# Patient Record
Sex: FEMALE | Race: WHITE | Employment: OTHER | URBAN - METROPOLITAN AREA
[De-identification: names, ages, dates, MRNs, and addresses within clinical notes are randomized per-mention and may not be internally consistent; named-entity substitution may affect disease eponyms.]

---

## 2017-02-23 RX ORDER — LISINOPRIL AND HYDROCHLOROTHIAZIDE 25; 20 MG/1; MG/1
TABLET ORAL
Qty: 90 TABLET | Refills: 0 | Status: SHIPPED | OUTPATIENT
Start: 2017-02-23 | End: 2017-05-30

## 2017-02-23 NOTE — TELEPHONE ENCOUNTER
Hypertensive Medications  Protocol Criteria:  · Appointment scheduled in the past 6 months or in the next 3 months  · BMP or CMP in the past 12 months  · Creatinine result < 2    Future Appointments       Provider Department Appt Notes    In 1 month Keesha

## 2017-03-10 RX ORDER — HYDROCHLOROTHIAZIDE 12.5 MG/1
TABLET ORAL
Qty: 30 TABLET | Refills: 1 | Status: SHIPPED | OUTPATIENT
Start: 2017-03-10 | End: 2017-04-05

## 2017-04-05 PROCEDURE — 80076 HEPATIC FUNCTION PANEL: CPT | Performed by: INTERNAL MEDICINE

## 2017-04-05 PROCEDURE — 36415 COLL VENOUS BLD VENIPUNCTURE: CPT | Performed by: INTERNAL MEDICINE

## 2017-04-05 PROCEDURE — 80048 BASIC METABOLIC PNL TOTAL CA: CPT | Performed by: INTERNAL MEDICINE

## 2017-04-05 PROCEDURE — 80061 LIPID PANEL: CPT | Performed by: INTERNAL MEDICINE

## 2017-05-26 ENCOUNTER — TELEPHONE (OUTPATIENT)
Dept: NEPHROLOGY | Facility: CLINIC | Age: 82
End: 2017-05-26

## 2017-05-26 NOTE — TELEPHONE ENCOUNTER
Patient contacted. She describes feeling as if her heart is skipping a beat. Happens for a brief time, then stops but keeps reoccurring. Denies SOB, no chest pain. She does see a Cardiologist Dr. Zoya Juárez and is going to call his office. . Also described aci

## 2017-05-26 NOTE — TELEPHONE ENCOUNTER
Contacted pt and notified her MKK advised to call Dr. Winter Lighter office.  She stated she did contact them -- Dr. Sadaf Beltran out of office but they told her it didn't sound like she needed to go to ED and scheduled her an appt for 5/30/17 when Dr. Sadaf Beltran is aidan

## 2017-05-26 NOTE — TELEPHONE ENCOUNTER
Pt states she is experiencing like palpitations, states is not chest pain, or not sure if is something else, Requesting apt, transferring to RN.

## 2017-09-20 ENCOUNTER — OFFICE VISIT (OUTPATIENT)
Dept: NEPHROLOGY | Facility: CLINIC | Age: 82
End: 2017-09-20

## 2017-09-20 VITALS
BODY MASS INDEX: 37.91 KG/M2 | HEART RATE: 76 BPM | HEIGHT: 63.5 IN | DIASTOLIC BLOOD PRESSURE: 84 MMHG | TEMPERATURE: 99 F | WEIGHT: 216.63 LBS | SYSTOLIC BLOOD PRESSURE: 141 MMHG

## 2017-09-20 DIAGNOSIS — E78.5 DYSLIPIDEMIA: ICD-10-CM

## 2017-09-20 DIAGNOSIS — I47.1 PAROXYSMAL SVT (SUPRAVENTRICULAR TACHYCARDIA) (HCC): ICD-10-CM

## 2017-09-20 DIAGNOSIS — I10 ESSENTIAL HYPERTENSION: Primary | ICD-10-CM

## 2017-09-20 PROCEDURE — 99215 OFFICE O/P EST HI 40 MIN: CPT | Performed by: INTERNAL MEDICINE

## 2017-09-20 PROCEDURE — G0463 HOSPITAL OUTPT CLINIC VISIT: HCPCS | Performed by: INTERNAL MEDICINE

## 2017-09-20 RX ORDER — MULTIVITAMIN WITH IRON
250 TABLET ORAL DAILY
COMMUNITY

## 2017-09-21 NOTE — PATIENT INSTRUCTIONS
These do labs as ordered. Try to keep your feet elevated, follow a low-salt diet and wear support hose. Let me know if your right-sided abdominal pain does not resolve.

## 2017-09-21 NOTE — PROGRESS NOTES
[facility], [facility]  Nephrology Daily Progress Note    Ivanna Fraga  [id number]  80year old  Patient presents with: Annual: Medicare Annual Well Visit      HPI:   Ivanna Fraga is a 80year old female.   59-year-old female with a history of hypertensio cough, dyspnea and wheezing      PHYSICAL EXAM:     Vitals History 5/30/2017 9/20/2017 9/20/2017   /83 - 141/84   Pulse 71 - 76   Resp - - -   Temp - - 98.5   Weight 215 lbs - 216 lbs 10 oz   Height 64.000 - 63.500   BODY MASS INDEX - 37.77 -       V omeprazole (PRILOSEC) 20 MG Oral Capsule Delayed Release, Take 20 mg by mouth every morning.  , Disp: , Rfl:   •  Elastic Bandages & Supports (LUMBAR BACK BRACE/SUPPORT PAD) Does not apply Misc, 1 each by Does not apply route daily as needed. , Disp: 1 each

## 2017-09-22 ENCOUNTER — LAB ENCOUNTER (OUTPATIENT)
Dept: LAB | Age: 82
End: 2017-09-22
Attending: INTERNAL MEDICINE
Payer: MEDICARE

## 2017-09-22 DIAGNOSIS — I10 ESSENTIAL HYPERTENSION: ICD-10-CM

## 2017-09-22 DIAGNOSIS — E78.5 DYSLIPIDEMIA: ICD-10-CM

## 2017-09-22 DIAGNOSIS — I47.1 PAROXYSMAL SVT (SUPRAVENTRICULAR TACHYCARDIA) (HCC): ICD-10-CM

## 2017-09-22 DIAGNOSIS — E78.00 PURE HYPERCHOLESTEROLEMIA: ICD-10-CM

## 2017-09-22 LAB
ALBUMIN SERPL BCP-MCNC: 3.8 G/DL (ref 3.5–4.8)
ALBUMIN/GLOB SERPL: 1.1 {RATIO} (ref 1–2)
ALP SERPL-CCNC: 62 U/L (ref 32–100)
ALT SERPL-CCNC: 21 U/L (ref 14–54)
ANION GAP SERPL CALC-SCNC: 10 MMOL/L (ref 0–18)
AST SERPL-CCNC: 33 U/L (ref 15–41)
BASOPHILS # BLD: 0.1 K/UL (ref 0–0.2)
BASOPHILS NFR BLD: 1 %
BILIRUB DIRECT SERPL-MCNC: 0.2 MG/DL (ref 0–0.2)
BILIRUB SERPL-MCNC: 0.7 MG/DL (ref 0.3–1.2)
BILIRUB UR QL: NEGATIVE
BUN SERPL-MCNC: 24 MG/DL (ref 8–20)
BUN/CREAT SERPL: 24 (ref 10–20)
CALCIUM SERPL-MCNC: 9.4 MG/DL (ref 8.5–10.5)
CHLORIDE SERPL-SCNC: 104 MMOL/L (ref 95–110)
CHOLEST SERPL-MCNC: 201 MG/DL (ref 110–200)
CHOLEST SERPL-MCNC: 202 MG/DL (ref 110–200)
CO2 SERPL-SCNC: 25 MMOL/L (ref 22–32)
COLOR UR: YELLOW
CREAT SERPL-MCNC: 1 MG/DL (ref 0.5–1.5)
EOSINOPHIL # BLD: 0.2 K/UL (ref 0–0.7)
EOSINOPHIL NFR BLD: 4 %
ERYTHROCYTE [DISTWIDTH] IN BLOOD BY AUTOMATED COUNT: 13.5 % (ref 11–15)
GLOBULIN PLAS-MCNC: 3.6 G/DL (ref 2.5–3.7)
GLUCOSE SERPL-MCNC: 90 MG/DL (ref 70–99)
GLUCOSE UR-MCNC: NEGATIVE MG/DL
HCT VFR BLD AUTO: 40.7 % (ref 35–48)
HDLC SERPL-MCNC: 49 MG/DL
HDLC SERPL-MCNC: 49 MG/DL
HGB BLD-MCNC: 13.7 G/DL (ref 12–16)
KETONES UR-MCNC: NEGATIVE MG/DL
LDLC SERPL CALC-MCNC: 127 MG/DL (ref 0–99)
LDLC SERPL CALC-MCNC: 128 MG/DL (ref 0–99)
LYMPHOCYTES # BLD: 2.3 K/UL (ref 1–4)
LYMPHOCYTES NFR BLD: 44 %
MAGNESIUM SERPL-MCNC: 2.1 MG/DL (ref 1.8–2.5)
MCH RBC QN AUTO: 30.3 PG (ref 27–32)
MCHC RBC AUTO-ENTMCNC: 33.6 G/DL (ref 32–37)
MCV RBC AUTO: 90 FL (ref 80–100)
MONOCYTES # BLD: 0.4 K/UL (ref 0–1)
MONOCYTES NFR BLD: 7 %
NEUTROPHILS # BLD AUTO: 2.3 K/UL (ref 1.8–7.7)
NEUTROPHILS NFR BLD: 44 %
NITRITE UR QL STRIP.AUTO: NEGATIVE
NONHDLC SERPL-MCNC: 152 MG/DL
NONHDLC SERPL-MCNC: 153 MG/DL
OSMOLALITY UR CALC.SUM OF ELEC: 292 MOSM/KG (ref 275–295)
PH UR: 6 [PH] (ref 5–8)
PLATELET # BLD AUTO: 212 K/UL (ref 140–400)
PMV BLD AUTO: 8 FL (ref 7.4–10.3)
POTASSIUM SERPL-SCNC: 3.7 MMOL/L (ref 3.3–5.1)
PROT SERPL-MCNC: 7.4 G/DL (ref 5.9–8.4)
PROT UR-MCNC: 30 MG/DL
RBC # BLD AUTO: 4.53 M/UL (ref 3.7–5.4)
RBC #/AREA URNS AUTO: 4 /HPF
SODIUM SERPL-SCNC: 139 MMOL/L (ref 136–144)
SP GR UR STRIP: 1.02 (ref 1–1.03)
TRIGL SERPL-MCNC: 125 MG/DL (ref 1–149)
TRIGL SERPL-MCNC: 127 MG/DL (ref 1–149)
TSH SERPL-ACNC: 2.6 UIU/ML (ref 0.45–5.33)
UROBILINOGEN UR STRIP-ACNC: 2
VIT C UR-MCNC: NEGATIVE MG/DL
WBC # BLD AUTO: 5.2 K/UL (ref 4–11)
WBC #/AREA URNS AUTO: 20 /HPF

## 2017-09-22 PROCEDURE — 80061 LIPID PANEL: CPT

## 2017-09-22 PROCEDURE — 82248 BILIRUBIN DIRECT: CPT

## 2017-09-22 PROCEDURE — 83735 ASSAY OF MAGNESIUM: CPT

## 2017-09-22 PROCEDURE — 36415 COLL VENOUS BLD VENIPUNCTURE: CPT

## 2017-09-22 PROCEDURE — 81001 URINALYSIS AUTO W/SCOPE: CPT

## 2017-09-22 PROCEDURE — 85025 COMPLETE CBC W/AUTO DIFF WBC: CPT

## 2017-09-22 PROCEDURE — 80053 COMPREHEN METABOLIC PANEL: CPT

## 2017-09-22 PROCEDURE — 84443 ASSAY THYROID STIM HORMONE: CPT

## 2017-09-28 ENCOUNTER — TELEPHONE (OUTPATIENT)
Dept: NEPHROLOGY | Facility: CLINIC | Age: 82
End: 2017-09-28

## 2017-09-28 DIAGNOSIS — Z87.440 HISTORY OF UTI: ICD-10-CM

## 2017-09-28 DIAGNOSIS — R10.31 RLQ ABDOMINAL PAIN: ICD-10-CM

## 2017-09-28 DIAGNOSIS — N39.0 URINARY TRACT INFECTION WITHOUT HEMATURIA, SITE UNSPECIFIED: Primary | ICD-10-CM

## 2017-09-28 NOTE — TELEPHONE ENCOUNTER
Patient contacted. Results message from Dr. Johanny Amor read to patient. She knows to watch her diet for elevated cholesterol and  states she does have some burning after she urinates. Aware to do urine culture. Lab order entered in system. No fasting needed.

## 2017-09-28 NOTE — TELEPHONE ENCOUNTER
We will see what urine culture shows first but if abdominal pain persists we will need to do a CT scan.

## 2017-09-28 NOTE — TELEPHONE ENCOUNTER
Labs are good and kidney function was able. Cholesterol remains elevated. Watch diet carefully. Urinalysis does suggest a UTI. Any symptoms. Do urine culture and sensitivity. Is her right-sided abdominal pain gone? ??

## 2017-09-29 ENCOUNTER — APPOINTMENT (OUTPATIENT)
Dept: LAB | Age: 82
End: 2017-09-29
Attending: INTERNAL MEDICINE
Payer: MEDICARE

## 2017-09-29 DIAGNOSIS — N39.0 URINARY TRACT INFECTION WITHOUT HEMATURIA, SITE UNSPECIFIED: ICD-10-CM

## 2017-09-29 PROCEDURE — 87086 URINE CULTURE/COLONY COUNT: CPT

## 2017-10-02 NOTE — TELEPHONE ENCOUNTER
Urine culture was no growth.   If still having right-sided abdominal pain do a CT scan of the abdomen and pelvis with GI contrast but no IV contrast

## 2017-10-03 NOTE — TELEPHONE ENCOUNTER
Spoke with patient, pain continues at intervals through out day but is at pain level two or three. Patient is agreeable to having CT done. Orders entered.

## 2017-10-06 PROBLEM — I65.23 BILATERAL CAROTID ARTERY STENOSIS: Status: ACTIVE | Noted: 2017-10-06

## 2017-10-16 ENCOUNTER — HOSPITAL ENCOUNTER (OUTPATIENT)
Dept: CT IMAGING | Facility: HOSPITAL | Age: 82
Discharge: HOME | End: 2017-10-16
Attending: INTERNAL MEDICINE
Payer: MEDICARE

## 2017-10-16 DIAGNOSIS — R10.31 RLQ ABDOMINAL PAIN: ICD-10-CM

## 2017-10-16 DIAGNOSIS — Z87.440 HISTORY OF UTI: ICD-10-CM

## 2017-10-16 PROCEDURE — 74176 CT ABD & PELVIS W/O CONTRAST: CPT | Performed by: INTERNAL MEDICINE

## 2017-10-17 ENCOUNTER — TELEPHONE (OUTPATIENT)
Dept: NEPHROLOGY | Facility: CLINIC | Age: 82
End: 2017-10-17

## 2017-10-17 NOTE — TELEPHONE ENCOUNTER
Spoke to pt and notified her of MEKA's message below. She states she is not having abdominal pain anymore. I advised her to monitor for symptoms and if they arise again to call our office & MEKA will refer her to GI.

## 2017-11-28 RX ORDER — LISINOPRIL AND HYDROCHLOROTHIAZIDE 25; 20 MG/1; MG/1
TABLET ORAL
Qty: 90 TABLET | Refills: 0 | Status: SHIPPED | OUTPATIENT
Start: 2017-11-28 | End: 2018-03-19 | Stop reason: DRUGHIGH

## 2018-03-19 ENCOUNTER — OFFICE VISIT (OUTPATIENT)
Dept: NEPHROLOGY | Facility: CLINIC | Age: 83
End: 2018-03-19

## 2018-03-19 VITALS
DIASTOLIC BLOOD PRESSURE: 64 MMHG | BODY MASS INDEX: 37.39 KG/M2 | WEIGHT: 219 LBS | HEIGHT: 64 IN | SYSTOLIC BLOOD PRESSURE: 127 MMHG | HEART RATE: 57 BPM

## 2018-03-19 DIAGNOSIS — I10 ESSENTIAL HYPERTENSION: Primary | ICD-10-CM

## 2018-03-19 DIAGNOSIS — E78.00 PURE HYPERCHOLESTEROLEMIA: ICD-10-CM

## 2018-03-19 DIAGNOSIS — I47.1 PAROXYSMAL SVT (SUPRAVENTRICULAR TACHYCARDIA) (HCC): ICD-10-CM

## 2018-03-19 PROCEDURE — G0463 HOSPITAL OUTPT CLINIC VISIT: HCPCS | Performed by: INTERNAL MEDICINE

## 2018-03-19 PROCEDURE — 99214 OFFICE O/P EST MOD 30 MIN: CPT | Performed by: INTERNAL MEDICINE

## 2018-03-19 RX ORDER — LISINOPRIL AND HYDROCHLOROTHIAZIDE 12.5; 1 MG/1; MG/1
1 TABLET ORAL DAILY
Qty: 90 TABLET | Refills: 1 | Status: SHIPPED | OUTPATIENT
Start: 2018-03-19 | End: 2018-11-05

## 2018-03-22 NOTE — PROGRESS NOTES
[facility], [facility]  Nephrology Daily Progress Note    Kevin Sam  [id number]  80year old  Patient presents with:  Chronic Kidney Disease      HPI:   Kevin Sam is a 80year old female.   43-year-old female with a history of hypertension, PSVT, ear abnormal cervical, supraclavicular or axillary adenopathy is noted  Respiratory: normal to inspection lungs are clear to auscultation bilaterally normal respiratory effort  Cardiovascular: regular rate and rhythm no murmurs, gallups, or rubs  Abdomen: soft by Does not apply route daily as needed. , Disp: 1 each, Rfl: 0    Allergies:    Atorvastatin            Other (See Comments)    Comment:Double Vision  Statins                          ASSESSMENT/PLAN:   Assessment   Essential hypertension  (primary encount

## 2018-09-10 ENCOUNTER — OFFICE VISIT (OUTPATIENT)
Dept: NEPHROLOGY | Facility: CLINIC | Age: 83
End: 2018-09-10
Payer: MEDICARE

## 2018-09-10 VITALS
DIASTOLIC BLOOD PRESSURE: 72 MMHG | HEART RATE: 51 BPM | BODY MASS INDEX: 35.82 KG/M2 | HEIGHT: 64 IN | SYSTOLIC BLOOD PRESSURE: 119 MMHG | WEIGHT: 209.81 LBS

## 2018-09-10 DIAGNOSIS — M25.552 PAIN OF LEFT HIP JOINT: ICD-10-CM

## 2018-09-10 DIAGNOSIS — E78.00 PURE HYPERCHOLESTEROLEMIA: ICD-10-CM

## 2018-09-10 DIAGNOSIS — M54.50 CHRONIC LEFT-SIDED LOW BACK PAIN WITHOUT SCIATICA: ICD-10-CM

## 2018-09-10 DIAGNOSIS — G89.29 CHRONIC LEFT-SIDED LOW BACK PAIN WITHOUT SCIATICA: ICD-10-CM

## 2018-09-10 DIAGNOSIS — I10 ESSENTIAL HYPERTENSION: Primary | ICD-10-CM

## 2018-09-10 PROCEDURE — G0463 HOSPITAL OUTPT CLINIC VISIT: HCPCS | Performed by: INTERNAL MEDICINE

## 2018-09-10 PROCEDURE — 99214 OFFICE O/P EST MOD 30 MIN: CPT | Performed by: INTERNAL MEDICINE

## 2018-09-11 NOTE — PROGRESS NOTES
[facility]  Nephrology Daily Progress Note    Ousmane Lincoln  [id number]  80year old  Patient presents with:  Hypertension      HPI:   Ousmane Lincoln is a 80year old female.   80year old female with a history of hypertension, PSVT, followed by D responsive no acute distress noted  Neck/Thyroid: neck is supple without adenopathy  Lymphatic: no abnormal cervical, supraclavicular or axillary adenopathy is noted  Respiratory: normal to inspection lungs are clear to auscultation bilaterally normal resp Comment:Double Vision  Statins                          ASSESSMENT/PLAN:   Assessment   Essential hypertension  (primary encounter diagnosis)  Pure hypercholesterolemia  Pain of left hip joint  Chronic left-sided low back pain without sciatica    Patient A

## 2018-09-13 ENCOUNTER — HOSPITAL ENCOUNTER (OUTPATIENT)
Dept: GENERAL RADIOLOGY | Facility: HOSPITAL | Age: 83
Discharge: HOME | End: 2018-09-13
Attending: INTERNAL MEDICINE
Payer: MEDICARE

## 2018-09-13 ENCOUNTER — LAB ENCOUNTER (OUTPATIENT)
Dept: LAB | Facility: HOSPITAL | Age: 83
End: 2018-09-13
Attending: INTERNAL MEDICINE
Payer: MEDICARE

## 2018-09-13 DIAGNOSIS — M54.50 CHRONIC LEFT-SIDED LOW BACK PAIN WITHOUT SCIATICA: ICD-10-CM

## 2018-09-13 DIAGNOSIS — I10 ESSENTIAL HYPERTENSION: ICD-10-CM

## 2018-09-13 DIAGNOSIS — G89.29 CHRONIC LEFT-SIDED LOW BACK PAIN WITHOUT SCIATICA: ICD-10-CM

## 2018-09-13 DIAGNOSIS — M25.552 PAIN OF LEFT HIP JOINT: ICD-10-CM

## 2018-09-13 DIAGNOSIS — E78.00 PURE HYPERCHOLESTEROLEMIA: ICD-10-CM

## 2018-09-13 LAB
ALBUMIN SERPL BCP-MCNC: 4 G/DL (ref 3.5–4.8)
ALBUMIN/GLOB SERPL: 1 {RATIO} (ref 1–2)
ALP SERPL-CCNC: 68 U/L (ref 32–100)
ALT SERPL-CCNC: 22 U/L (ref 14–54)
ANION GAP SERPL CALC-SCNC: 8 MMOL/L (ref 0–18)
AST SERPL-CCNC: 27 U/L (ref 15–41)
BASOPHILS # BLD: 0.1 K/UL (ref 0–0.2)
BASOPHILS NFR BLD: 1 %
BILIRUB SERPL-MCNC: 0.7 MG/DL (ref 0.3–1.2)
BILIRUB UR QL: NEGATIVE
BUN SERPL-MCNC: 26 MG/DL (ref 8–20)
BUN/CREAT SERPL: 20.6 (ref 10–20)
CALCIUM SERPL-MCNC: 9.5 MG/DL (ref 8.5–10.5)
CHLORIDE SERPL-SCNC: 105 MMOL/L (ref 95–110)
CHOLEST SERPL-MCNC: 204 MG/DL (ref 110–200)
CO2 SERPL-SCNC: 26 MMOL/L (ref 22–32)
COLOR UR: YELLOW
CREAT SERPL-MCNC: 1.26 MG/DL (ref 0.5–1.5)
EOSINOPHIL # BLD: 0.1 K/UL (ref 0–0.7)
EOSINOPHIL NFR BLD: 2 %
ERYTHROCYTE [DISTWIDTH] IN BLOOD BY AUTOMATED COUNT: 13.7 % (ref 11–15)
GLOBULIN PLAS-MCNC: 3.9 G/DL (ref 2.5–3.7)
GLUCOSE SERPL-MCNC: 96 MG/DL (ref 70–99)
GLUCOSE UR-MCNC: NEGATIVE MG/DL
HCT VFR BLD AUTO: 43.4 % (ref 35–48)
HDLC SERPL-MCNC: 54 MG/DL
HGB BLD-MCNC: 14.4 G/DL (ref 12–16)
HGB UR QL STRIP.AUTO: NEGATIVE
KETONES UR-MCNC: NEGATIVE MG/DL
LDLC SERPL CALC-MCNC: 128 MG/DL (ref 0–99)
LYMPHOCYTES # BLD: 2.3 K/UL (ref 1–4)
LYMPHOCYTES NFR BLD: 43 %
MCH RBC QN AUTO: 30.4 PG (ref 27–32)
MCHC RBC AUTO-ENTMCNC: 33.1 G/DL (ref 32–37)
MCV RBC AUTO: 92 FL (ref 80–100)
MONOCYTES # BLD: 0.4 K/UL (ref 0–1)
MONOCYTES NFR BLD: 7 %
NEUTROPHILS # BLD AUTO: 2.5 K/UL (ref 1.8–7.7)
NEUTROPHILS NFR BLD: 47 %
NITRITE UR QL STRIP.AUTO: NEGATIVE
NONHDLC SERPL-MCNC: 150 MG/DL
OSMOLALITY UR CALC.SUM OF ELEC: 293 MOSM/KG (ref 275–295)
PATIENT FASTING: YES
PH UR: 5 [PH] (ref 5–8)
PLATELET # BLD AUTO: 210 K/UL (ref 140–400)
PMV BLD AUTO: 8.1 FL (ref 7.4–10.3)
POTASSIUM SERPL-SCNC: 3.6 MMOL/L (ref 3.3–5.1)
PROT SERPL-MCNC: 7.9 G/DL (ref 5.9–8.4)
PROT UR-MCNC: NEGATIVE MG/DL
RBC # BLD AUTO: 4.72 M/UL (ref 3.7–5.4)
RBC #/AREA URNS AUTO: 5 /HPF
SODIUM SERPL-SCNC: 139 MMOL/L (ref 136–144)
SP GR UR STRIP: 1.02 (ref 1–1.03)
TRIGL SERPL-MCNC: 112 MG/DL (ref 1–149)
UROBILINOGEN UR STRIP-ACNC: <2
VIT C UR-MCNC: NEGATIVE MG/DL
WBC # BLD AUTO: 5.3 K/UL (ref 4–11)
WBC #/AREA URNS AUTO: 22 /HPF

## 2018-09-13 PROCEDURE — 80053 COMPREHEN METABOLIC PANEL: CPT

## 2018-09-13 PROCEDURE — 85025 COMPLETE CBC W/AUTO DIFF WBC: CPT

## 2018-09-13 PROCEDURE — 81001 URINALYSIS AUTO W/SCOPE: CPT

## 2018-09-13 PROCEDURE — 72100 X-RAY EXAM L-S SPINE 2/3 VWS: CPT | Performed by: INTERNAL MEDICINE

## 2018-09-13 PROCEDURE — 73502 X-RAY EXAM HIP UNI 2-3 VIEWS: CPT | Performed by: INTERNAL MEDICINE

## 2018-09-13 PROCEDURE — 80061 LIPID PANEL: CPT

## 2018-09-13 PROCEDURE — 36415 COLL VENOUS BLD VENIPUNCTURE: CPT

## 2018-09-14 ENCOUNTER — TELEPHONE (OUTPATIENT)
Dept: NEPHROLOGY | Facility: CLINIC | Age: 83
End: 2018-09-14

## 2018-09-14 DIAGNOSIS — R94.4 ABNORMAL KIDNEY FUNCTION STUDY: Primary | ICD-10-CM

## 2018-09-14 NOTE — TELEPHONE ENCOUNTER
X-rays of the lumbar spine and the hip show significant arthritis. If she wants anything further done refer to orthopedics. Can also try a course of physical therapy to see if that helps with her symptoms.   Labs show cholesterol still elevated but stable

## 2018-09-18 NOTE — TELEPHONE ENCOUNTER
Patient contacted. Results message from Dr. Kvng Harmon read to patient. She states she just started an arthritis exercise class so she will wait and see if this helps her symptoms. If not she is aware that Dr. Kvng Harmon would refer her to ortho.  Advised to sally

## 2018-11-05 RX ORDER — LISINOPRIL AND HYDROCHLOROTHIAZIDE 12.5; 1 MG/1; MG/1
1 TABLET ORAL DAILY
Qty: 90 TABLET | Refills: 1 | Status: SHIPPED | OUTPATIENT
Start: 2018-11-05 | End: 2019-05-02

## 2018-11-19 ENCOUNTER — TELEPHONE (OUTPATIENT)
Dept: NEPHROLOGY | Facility: CLINIC | Age: 83
End: 2018-11-19

## 2018-12-03 ENCOUNTER — APPOINTMENT (OUTPATIENT)
Dept: LAB | Age: 83
End: 2018-12-03
Attending: INTERNAL MEDICINE
Payer: MEDICARE

## 2018-12-03 DIAGNOSIS — R94.4 ABNORMAL KIDNEY FUNCTION STUDY: ICD-10-CM

## 2018-12-03 PROCEDURE — 80069 RENAL FUNCTION PANEL: CPT

## 2018-12-03 PROCEDURE — 36415 COLL VENOUS BLD VENIPUNCTURE: CPT

## 2018-12-06 ENCOUNTER — TELEPHONE (OUTPATIENT)
Dept: NEPHROLOGY | Facility: CLINIC | Age: 83
End: 2018-12-06

## 2018-12-06 DIAGNOSIS — I10 ESSENTIAL HYPERTENSION: ICD-10-CM

## 2018-12-06 DIAGNOSIS — E78.00 PURE HYPERCHOLESTEROLEMIA: Primary | ICD-10-CM

## 2018-12-06 NOTE — TELEPHONE ENCOUNTER
Patient contacted. Results message from Dr. Feliciano Moyer read to patient. She is aware to do Renal and Lipid panels just before her appointment in March. Orders entered in [address] [place] [address] Rd. Patient instructed to fast 12 hours for lab work.  Appointment is scheduled for 3/11

## 2019-02-26 ENCOUNTER — APPOINTMENT (OUTPATIENT)
Dept: LAB | Age: 84
End: 2019-02-26
Attending: INTERNAL MEDICINE
Payer: MEDICARE

## 2019-02-26 DIAGNOSIS — E78.00 PURE HYPERCHOLESTEROLEMIA: ICD-10-CM

## 2019-02-26 DIAGNOSIS — I10 ESSENTIAL HYPERTENSION: ICD-10-CM

## 2019-02-26 LAB
ALBUMIN SERPL-MCNC: 3.5 G/DL (ref 3.4–5)
ANION GAP SERPL CALC-SCNC: 8 MMOL/L (ref 0–18)
BUN BLD-MCNC: 27 MG/DL (ref 7–18)
BUN/CREAT SERPL: 23.5 (ref 10–20)
CALCIUM BLD-MCNC: 9.2 MG/DL (ref 8.5–10.1)
CHLORIDE SERPL-SCNC: 107 MMOL/L (ref 98–107)
CHOLEST SMN-MCNC: 200 MG/DL (ref ?–200)
CO2 SERPL-SCNC: 27 MMOL/L (ref 21–32)
CREAT BLD-MCNC: 1.15 MG/DL (ref 0.55–1.02)
GLUCOSE BLD-MCNC: 92 MG/DL (ref 70–99)
HDLC SERPL-MCNC: 54 MG/DL (ref 40–59)
LDLC SERPL CALC-MCNC: 122 MG/DL (ref ?–100)
NONHDLC SERPL-MCNC: 146 MG/DL (ref ?–130)
OSMOLALITY SERPL CALC.SUM OF ELEC: 299 MOSM/KG (ref 275–295)
PHOSPHATE SERPL-MCNC: 3.4 MG/DL (ref 2.5–4.9)
POTASSIUM SERPL-SCNC: 3.8 MMOL/L (ref 3.5–5.1)
SODIUM SERPL-SCNC: 142 MMOL/L (ref 136–145)
TRIGL SERPL-MCNC: 118 MG/DL (ref 30–149)
VLDLC SERPL CALC-MCNC: 24 MG/DL (ref 0–30)

## 2019-02-26 PROCEDURE — 80061 LIPID PANEL: CPT

## 2019-02-26 PROCEDURE — 80069 RENAL FUNCTION PANEL: CPT

## 2019-02-26 PROCEDURE — 36415 COLL VENOUS BLD VENIPUNCTURE: CPT

## 2019-03-11 ENCOUNTER — OFFICE VISIT (OUTPATIENT)
Dept: NEPHROLOGY | Facility: CLINIC | Age: 84
End: 2019-03-11
Payer: MEDICARE

## 2019-03-11 VITALS
WEIGHT: 217.81 LBS | DIASTOLIC BLOOD PRESSURE: 81 MMHG | HEART RATE: 68 BPM | HEIGHT: 64 IN | SYSTOLIC BLOOD PRESSURE: 145 MMHG | BODY MASS INDEX: 37.19 KG/M2

## 2019-03-11 DIAGNOSIS — I10 ESSENTIAL HYPERTENSION: Primary | ICD-10-CM

## 2019-03-11 DIAGNOSIS — N18.30 CKD (CHRONIC KIDNEY DISEASE) STAGE 3, GFR 30-59 ML/MIN (HCC): ICD-10-CM

## 2019-03-11 DIAGNOSIS — E78.00 PURE HYPERCHOLESTEROLEMIA: ICD-10-CM

## 2019-03-11 PROCEDURE — G0463 HOSPITAL OUTPT CLINIC VISIT: HCPCS | Performed by: INTERNAL MEDICINE

## 2019-03-11 PROCEDURE — 99214 OFFICE O/P EST MOD 30 MIN: CPT | Performed by: INTERNAL MEDICINE

## 2019-03-12 PROBLEM — N18.30 CKD (CHRONIC KIDNEY DISEASE) STAGE 3, GFR 30-59 ML/MIN (HCC): Status: ACTIVE | Noted: 2019-03-12

## 2019-03-12 NOTE — PATIENT INSTRUCTIONS
See the ear nose and throat doctors for your ringing in the years. I ordered labs for your six-month follow-up visit.

## 2019-03-12 NOTE — PROGRESS NOTES
[facility], [facility]  Nephrology Daily Progress Note    Karlene Rm  [id number]  80year old  Patient presents with:  Hypertension      HPI:   Karlene Rm is a 80year old female.   27-year-old female with a history of hypertension, PSVT followed by  4/10/2018 9/10/2018 3/11/2019   Weight 215 lbs 209 lbs 13 oz 217 lbs 13 oz       Constitutional: appears well hydrated alert and responsive no acute distress noted  Neck/Thyroid: neck is supple without adenopathy  Lymphatic: no abnormal cervical, supraclav 500 MG Oral Tab, Take 500 mg by mouth daily with breakfast., Disp: , Rfl:   •  Elastic Bandages & Supports (LUMBAR BACK BRACE/SUPPORT PAD) Does not apply Misc, 1 each by Does not apply route daily as needed. , Disp: 1 each, Rfl: 0  •  omeprazole (PRILOSEC) joint swelling/joint pain

## 2019-04-10 PROBLEM — I49.3 PVCS (PREMATURE VENTRICULAR CONTRACTIONS): Status: ACTIVE | Noted: 2019-04-10

## 2019-05-02 RX ORDER — LISINOPRIL AND HYDROCHLOROTHIAZIDE 12.5; 1 MG/1; MG/1
1 TABLET ORAL DAILY
Qty: 90 TABLET | Refills: 1 | Status: SHIPPED | OUTPATIENT
Start: 2019-05-02 | End: 2019-10-25

## 2019-08-14 ENCOUNTER — OFFICE VISIT (OUTPATIENT)
Dept: OTOLARYNGOLOGY | Facility: CLINIC | Age: 84
End: 2019-08-14
Payer: MEDICARE

## 2019-08-14 ENCOUNTER — OFFICE VISIT (OUTPATIENT)
Dept: AUDIOLOGY | Facility: CLINIC | Age: 84
End: 2019-08-14
Payer: MEDICARE

## 2019-08-14 VITALS
WEIGHT: 211 LBS | SYSTOLIC BLOOD PRESSURE: 132 MMHG | BODY MASS INDEX: 36.02 KG/M2 | HEIGHT: 64 IN | DIASTOLIC BLOOD PRESSURE: 75 MMHG | TEMPERATURE: 99 F

## 2019-08-14 DIAGNOSIS — H61.23 BILATERAL IMPACTED CERUMEN: ICD-10-CM

## 2019-08-14 DIAGNOSIS — H90.3 SENSORINEURAL HEARING LOSS, BILATERAL: Primary | ICD-10-CM

## 2019-08-14 DIAGNOSIS — H93.13 TINNITUS OF BOTH EARS: Primary | ICD-10-CM

## 2019-08-14 PROCEDURE — 92557 COMPREHENSIVE HEARING TEST: CPT | Performed by: AUDIOLOGIST

## 2019-08-14 PROCEDURE — 92567 TYMPANOMETRY: CPT | Performed by: AUDIOLOGIST

## 2019-08-14 PROCEDURE — 99203 OFFICE O/P NEW LOW 30 MIN: CPT | Performed by: OTOLARYNGOLOGY

## 2019-08-14 PROCEDURE — G0268 REMOVAL OF IMPACTED WAX MD: HCPCS | Performed by: OTOLARYNGOLOGY

## 2019-08-14 NOTE — PROGRESS NOTES
AUDIOGRAM     Sofie Ambrocio was referred for testing by Tomasz Millard for bilateral tinnitus   7/30/1933  [id number]    Otoscopic inspection: right ear no cerumen; left ear no cerumen.        Tests/Procedures  Patient was tested via  standard inse

## 2019-08-14 NOTE — PATIENT INSTRUCTIONS
How Hearing Aids Can Help You    If you’re losing your hearing, it can be frustrating: But, hearing aids can help you hear what Gorman Goltz been missing. Not everyone who has hearing loss needs hearing aids.  But if your hearing loss is keeping you from commun © 4310-1462 [address]. [address] [facility], [facility] [place]. All rights reserved. This information is not intended as a substitute for professional medical care. Always follow your healthcare professional's instructions.

## 2019-08-14 NOTE — PROGRESS NOTES
Caleb Louis is a 80year old female.   Patient presents with:  Ear Problem: ringing in both ears, right ear is worse at least 6 months      HISTORY OF PRESENT ILLNESS  8/14/2019  Patient presents for evaluation of tinnitus This has been going on for mo ELECTROCARDIOGRAM, COMPLETE  05/09/2013    scanned to media tab         REVIEW OF SYSTEMS    System Neg/Pos Details   Constitutional Negative Fatigue, fever and weight loss.    ENMT Negative Neck mass headaches   Eyes Negative Blurred vision and vision sheehan Left: Normal. Septum is nl Turbinates - Right: Normal, Left: Normal.    After informed consent was obtained, the patients ears were examined under the operating microscope. Cerumen impaction was removed from bilateral ears using suction.  Tympanic membranes to call if symptoms worsen or if new otologic symptoms develop.           Aspen Land MD  8/14/2019

## 2019-09-09 ENCOUNTER — OFFICE VISIT (OUTPATIENT)
Dept: NEPHROLOGY | Facility: CLINIC | Age: 84
End: 2019-09-09
Payer: MEDICARE

## 2019-09-09 VITALS
BODY MASS INDEX: 37.19 KG/M2 | WEIGHT: 217.81 LBS | HEIGHT: 64 IN | SYSTOLIC BLOOD PRESSURE: 131 MMHG | HEART RATE: 58 BPM | DIASTOLIC BLOOD PRESSURE: 77 MMHG

## 2019-09-09 DIAGNOSIS — I10 ESSENTIAL HYPERTENSION: Primary | ICD-10-CM

## 2019-09-09 DIAGNOSIS — E78.00 PURE HYPERCHOLESTEROLEMIA: ICD-10-CM

## 2019-09-09 DIAGNOSIS — N18.30 CKD (CHRONIC KIDNEY DISEASE) STAGE 3, GFR 30-59 ML/MIN (HCC): ICD-10-CM

## 2019-09-09 PROCEDURE — G0463 HOSPITAL OUTPT CLINIC VISIT: HCPCS | Performed by: INTERNAL MEDICINE

## 2019-09-09 PROCEDURE — 99214 OFFICE O/P EST MOD 30 MIN: CPT | Performed by: INTERNAL MEDICINE

## 2019-09-10 NOTE — PROGRESS NOTES
[address] [facility]  Nephrology Daily Progress Note    Ousmane Lincoln  [id number]  80year old  Patient presents with:  Hypertension: follow up      HPI:   Ousmane Lincoln is a 80year old female.   14-year-old female with a history of hypertension, PSVT fol responsive no acute distress noted  Neck/Thyroid: neck is supple without adenopathy  Lymphatic: no abnormal cervical, supraclavicular or axillary adenopathy is noted  Respiratory: normal to inspection lungs are clear to auscultation bilaterally normal resp 30-59 ml/min (hcc)    Patient Active Problem List:     Osteoarthrosis, unspecified whether generalized or localized, pelvic region and thigh     Degeneration of lumbar or lumbosacral intervertebral disc     Spondylolisthesis     HTN (hypertension)     Pure

## 2019-10-14 ENCOUNTER — LAB ENCOUNTER (OUTPATIENT)
Dept: LAB | Age: 84
End: 2019-10-14
Attending: INTERNAL MEDICINE
Payer: MEDICARE

## 2019-10-14 DIAGNOSIS — E78.00 PURE HYPERCHOLESTEROLEMIA: ICD-10-CM

## 2019-10-14 DIAGNOSIS — N18.30 CKD (CHRONIC KIDNEY DISEASE) STAGE 3, GFR 30-59 ML/MIN (HCC): ICD-10-CM

## 2019-10-14 DIAGNOSIS — I10 ESSENTIAL HYPERTENSION: ICD-10-CM

## 2019-10-14 PROCEDURE — 80053 COMPREHEN METABOLIC PANEL: CPT

## 2019-10-14 PROCEDURE — 81001 URINALYSIS AUTO W/SCOPE: CPT

## 2019-10-14 PROCEDURE — 80061 LIPID PANEL: CPT

## 2019-10-14 PROCEDURE — 36415 COLL VENOUS BLD VENIPUNCTURE: CPT

## 2019-10-14 PROCEDURE — 85025 COMPLETE CBC W/AUTO DIFF WBC: CPT

## 2019-10-17 ENCOUNTER — TELEPHONE (OUTPATIENT)
Dept: NEPHROLOGY | Facility: CLINIC | Age: 84
End: 2019-10-17

## 2019-10-17 DIAGNOSIS — R82.90 ABNORMAL URINALYSIS: Primary | ICD-10-CM

## 2019-10-17 NOTE — TELEPHONE ENCOUNTER
Kidney function remains mildly abnormal but stable. Make sure she is drinking plenty of fluids and avoid nonsteroidals. Cholesterol borderline high but better. Urinalysis suggest a UTI. Any symptoms?   Do urine C&S

## 2019-10-17 NOTE — TELEPHONE ENCOUNTER
Contacted pt. Notified her of MKK's results message below. She admits that she doesn't drink as much water as she should so she will try to increase PO fluids.  She reports always having urinary frequency because of her age, gets up in the middle of the nig

## 2019-10-18 ENCOUNTER — APPOINTMENT (OUTPATIENT)
Dept: LAB | Age: 84
End: 2019-10-18
Attending: INTERNAL MEDICINE
Payer: MEDICARE

## 2019-10-18 DIAGNOSIS — R82.90 ABNORMAL URINALYSIS: ICD-10-CM

## 2019-10-18 PROCEDURE — 87086 URINE CULTURE/COLONY COUNT: CPT

## 2019-10-25 RX ORDER — LISINOPRIL AND HYDROCHLOROTHIAZIDE 12.5; 1 MG/1; MG/1
1 TABLET ORAL DAILY
Qty: 90 TABLET | Refills: 1 | Status: SHIPPED | OUTPATIENT
Start: 2019-10-25 | End: 2020-04-23

## 2020-03-09 ENCOUNTER — OFFICE VISIT (OUTPATIENT)
Dept: NEPHROLOGY | Facility: CLINIC | Age: 85
End: 2020-03-09
Payer: MEDICARE

## 2020-03-09 VITALS
HEART RATE: 60 BPM | HEIGHT: 64 IN | SYSTOLIC BLOOD PRESSURE: 142 MMHG | WEIGHT: 218.38 LBS | DIASTOLIC BLOOD PRESSURE: 72 MMHG | BODY MASS INDEX: 37.28 KG/M2

## 2020-03-09 DIAGNOSIS — I10 ESSENTIAL HYPERTENSION: ICD-10-CM

## 2020-03-09 DIAGNOSIS — E78.00 PURE HYPERCHOLESTEROLEMIA: ICD-10-CM

## 2020-03-09 DIAGNOSIS — R35.0 FREQUENCY OF URINATION: ICD-10-CM

## 2020-03-09 DIAGNOSIS — M54.16 CHRONIC LEFT LUMBAR RADICULOPATHY: Primary | ICD-10-CM

## 2020-03-09 DIAGNOSIS — N18.30 CKD (CHRONIC KIDNEY DISEASE) STAGE 3, GFR 30-59 ML/MIN (HCC): ICD-10-CM

## 2020-03-09 PROCEDURE — G0463 HOSPITAL OUTPT CLINIC VISIT: HCPCS | Performed by: INTERNAL MEDICINE

## 2020-03-09 PROCEDURE — 99214 OFFICE O/P EST MOD 30 MIN: CPT | Performed by: INTERNAL MEDICINE

## 2020-03-09 NOTE — PROGRESS NOTES
[facility], [facility]  Nephrology Daily Progress Note    Beverley Bunn  [id number]  80year old  Patient presents with:  Hypertension      HPI:   Beverley Bunn is a 80year old female.   26-year-old female with a history of hypertension, PSVT followed by  3/9/2020 3/9/2020   /77 - 142/72   Pulse 58 - 60   Resp - - -   Temp - - -   Weight 217 lbs 13 oz - 218 lbs 6 oz   Height 64.000 - 64   BODY MASS INDEX 37.39 37.49 -       VITALS: WEIGHT ONLY 8/14/2019 9/9/2019 3/9/2020   Weight 211 lbs 217 lbs 13 oz Take 81 mg by mouth daily. , Disp: , Rfl:   •  DILTIAZEM HCL ER COATED BEADS 240 MG Oral Capsule SR 24 Hr, TAKE 1 CAPSULE BY MOUTH EVERY DAY, Disp: 90 capsule, Rfl: 3  •  Metoprolol Succinate ER 50 MG Oral Tablet 24 Hr, Take 1 tablet (50 mg total) by mouth spine back in 2013. Consider physical therapy. As her symptoms are worse we will repeat now. Maintain good hydration and avoid nonsteroidals. Will call with results and determine follow-up. Return in 6 months.          Orders Placed This Encounter

## 2020-03-16 ENCOUNTER — APPOINTMENT (OUTPATIENT)
Dept: GENERAL RADIOLOGY | Facility: HOSPITAL | Age: 85
End: 2020-03-16
Attending: EMERGENCY MEDICINE
Payer: MEDICARE

## 2020-03-16 ENCOUNTER — HOSPITAL ENCOUNTER (EMERGENCY)
Facility: HOSPITAL | Age: 85
Discharge: HOME | End: 2020-03-16
Attending: EMERGENCY MEDICINE
Payer: MEDICARE

## 2020-03-16 ENCOUNTER — APPOINTMENT (OUTPATIENT)
Dept: CT IMAGING | Facility: HOSPITAL | Age: 85
End: 2020-03-16
Attending: EMERGENCY MEDICINE
Payer: MEDICARE

## 2020-03-16 VITALS
TEMPERATURE: 98 F | HEART RATE: 64 BPM | OXYGEN SATURATION: 98 % | RESPIRATION RATE: 18 BRPM | SYSTOLIC BLOOD PRESSURE: 144 MMHG | BODY MASS INDEX: 37.22 KG/M2 | HEIGHT: 64 IN | DIASTOLIC BLOOD PRESSURE: 80 MMHG | WEIGHT: 218 LBS

## 2020-03-16 DIAGNOSIS — S70.02XA CONTUSION OF LEFT HIP, INITIAL ENCOUNTER: Primary | ICD-10-CM

## 2020-03-16 DIAGNOSIS — S09.90XA INJURY OF HEAD, INITIAL ENCOUNTER: ICD-10-CM

## 2020-03-16 PROCEDURE — 73502 X-RAY EXAM HIP UNI 2-3 VIEWS: CPT | Performed by: EMERGENCY MEDICINE

## 2020-03-16 PROCEDURE — 70450 CT HEAD/BRAIN W/O DYE: CPT | Performed by: EMERGENCY MEDICINE

## 2020-03-16 PROCEDURE — 99284 EMERGENCY DEPT VISIT MOD MDM: CPT

## 2020-03-16 PROCEDURE — 93005 ELECTROCARDIOGRAM TRACING: CPT

## 2020-03-16 PROCEDURE — 93010 ELECTROCARDIOGRAM REPORT: CPT | Performed by: EMERGENCY MEDICINE

## 2020-03-16 NOTE — ED INITIAL ASSESSMENT (HPI)
Patient presents to ED with c/o of fall earlier today. Patient states that she fell to the side and her head hit the chair and then the floor. Patient states that she lost her balance when she fell, not dizzy, but stating she felt shaky.  Patient also has c

## 2020-03-17 NOTE — ED PROVIDER NOTES
Patient Seen in: [facility] AND [facility] Emergency Department    History   Patient presents with:  Fall      HPI    PE patient presents to the ED after falling earlier today.   She states she fell to her left side and hit the side of her head on a chair and the elements reviewed from today, pertinent positives to the presenting problem noted.     Physical Exam     ED Triage Vitals [03/16/20 1841]   /72   Pulse 62   Resp 18   Temp 97.7 °F (36.5 °C)   Temp src Oral   SpO2 98 %   O2 Device None (Room air) Results Available and Reviewed while in ED: Ct Brain Or Head (22878)    Result Date: 3/16/2020  CONCLUSION:  1. No acute findings. 2. 6 mm meningioma.     Dictated by (CST): Kathleen Thurman MD on 3/16/2020 at 7:52 PM     Finalized by (CST): Sean encounter    Disposition:  Discharge    Follow-up:  Rebecca Campos MD  [facility]  [phone]    Schedule an appointment as soon as possible for a visit in 3 days        Medications Prescribed:  Discharge Medication List a

## 2020-04-23 RX ORDER — LISINOPRIL AND HYDROCHLOROTHIAZIDE 12.5; 1 MG/1; MG/1
1 TABLET ORAL DAILY
Qty: 90 TABLET | Refills: 1 | Status: SHIPPED | OUTPATIENT
Start: 2020-04-23 | End: 2020-11-25

## 2020-06-05 ENCOUNTER — HOSPITAL ENCOUNTER (OUTPATIENT)
Dept: MRI IMAGING | Facility: HOSPITAL | Age: 85
Discharge: HOME | End: 2020-06-05
Attending: INTERNAL MEDICINE
Payer: MEDICARE

## 2020-06-05 DIAGNOSIS — M54.16 CHRONIC LEFT LUMBAR RADICULOPATHY: ICD-10-CM

## 2020-06-05 PROCEDURE — 72148 MRI LUMBAR SPINE W/O DYE: CPT | Performed by: INTERNAL MEDICINE

## 2020-06-07 ENCOUNTER — TELEPHONE (OUTPATIENT)
Dept: NEPHROLOGY | Facility: CLINIC | Age: 85
End: 2020-06-07

## 2020-06-07 NOTE — TELEPHONE ENCOUNTER
This MRI was ordered back in March 2020. Why did she wait. Is she still having significant lower back pain with radiculopathy? ?  Is it worse than when I last saw her. MRI shows fairly severe spinal stenosis.

## 2020-06-08 NOTE — TELEPHONE ENCOUNTER
Patient contacted. Phone#s for Dr. Cinthia Allen and Dr. Laverne Davis provided.  Advised patient to see which ever physician can see her the soonest.

## 2020-06-08 NOTE — TELEPHONE ENCOUNTER
Patient contacted. The reason she waited was due to Public health concern (IOLCE-50) She states she still has pain and tingling in her legs and the symptoms are about the same as when she was seen. Please advise on wha'ts next.

## 2020-06-11 ENCOUNTER — OFFICE VISIT (OUTPATIENT)
Dept: NEUROLOGY | Facility: CLINIC | Age: 85
End: 2020-06-11
Payer: MEDICARE

## 2020-06-11 VITALS — HEIGHT: 64 IN | WEIGHT: 217 LBS | BODY MASS INDEX: 37.05 KG/M2

## 2020-06-11 DIAGNOSIS — R26.9 GAIT DISTURBANCE: Primary | ICD-10-CM

## 2020-06-11 DIAGNOSIS — E66.3 PATIENT OVERWEIGHT: ICD-10-CM

## 2020-06-11 DIAGNOSIS — M48.062 SPINAL STENOSIS OF LUMBAR REGION WITH NEUROGENIC CLAUDICATION: ICD-10-CM

## 2020-06-11 DIAGNOSIS — M76.32 ILIOTIBIAL BAND SYNDROME OF LEFT SIDE: ICD-10-CM

## 2020-06-11 DIAGNOSIS — K21.9 GASTROESOPHAGEAL REFLUX DISEASE, ESOPHAGITIS PRESENCE NOT SPECIFIED: ICD-10-CM

## 2020-06-11 DIAGNOSIS — M81.0 SENILE OSTEOPOROSIS: ICD-10-CM

## 2020-06-11 DIAGNOSIS — N18.30 CKD (CHRONIC KIDNEY DISEASE) STAGE 3, GFR 30-59 ML/MIN (HCC): ICD-10-CM

## 2020-06-11 DIAGNOSIS — M16.0 PRIMARY OSTEOARTHRITIS OF BOTH HIPS: ICD-10-CM

## 2020-06-11 PROCEDURE — 99205 OFFICE O/P NEW HI 60 MIN: CPT | Performed by: PHYSICAL MEDICINE & REHABILITATION

## 2020-06-11 NOTE — PROGRESS NOTES
[address]  Progress Note    CHIEF COMPLAINT:  Patient presents with:  Low Back Pain: Patient presents today with pain across lower back going down the left side.  Patient states that it has been going on for Sexual activity: Not on file      FAMILY HISTORY:   Family History   Problem Relation Age of Onset   • Heart Disorder Father 52        heart attack cause of death   • Musculo-skelatal Disorder Mother         osteoporosis       CURRENT MEDICATIONS:   Laci Aguilar IT band bilaterally to light palpation  Neuro:   Cognition: alert & oriented x 3, attentive, able to follow 2 step commands, comprehention intact, spontaneous speech intact  Strength: Lower extremities have 5/5 strength  Sensation: Normal lower extremities significant negative comorbidity        RTC:    Return for EMG. Discharge Instructions were provided as documented in AVS summary. The patient was in agreement with the assessment and plan. All questions were answered.   There were no barriers to l

## 2020-07-09 ENCOUNTER — MED REC SCAN ONLY (OUTPATIENT)
Dept: NEUROLOGY | Facility: CLINIC | Age: 85
End: 2020-07-09

## 2020-08-05 ENCOUNTER — OFFICE VISIT (OUTPATIENT)
Dept: NEUROLOGY | Facility: CLINIC | Age: 85
End: 2020-08-05
Payer: MEDICARE

## 2020-08-05 VITALS — RESPIRATION RATE: 14 BRPM | DIASTOLIC BLOOD PRESSURE: 78 MMHG | HEART RATE: 56 BPM | SYSTOLIC BLOOD PRESSURE: 140 MMHG

## 2020-08-05 DIAGNOSIS — R26.9 GAIT DISTURBANCE: ICD-10-CM

## 2020-08-05 DIAGNOSIS — M76.32 ILIOTIBIAL BAND SYNDROME OF LEFT SIDE: ICD-10-CM

## 2020-08-05 DIAGNOSIS — K21.9 GASTROESOPHAGEAL REFLUX DISEASE, ESOPHAGITIS PRESENCE NOT SPECIFIED: ICD-10-CM

## 2020-08-05 DIAGNOSIS — E66.3 PATIENT OVERWEIGHT: ICD-10-CM

## 2020-08-05 DIAGNOSIS — M48.062 SPINAL STENOSIS OF LUMBAR REGION WITH NEUROGENIC CLAUDICATION: Primary | ICD-10-CM

## 2020-08-05 DIAGNOSIS — M16.0 PRIMARY OSTEOARTHRITIS OF BOTH HIPS: ICD-10-CM

## 2020-08-05 DIAGNOSIS — N18.30 CKD (CHRONIC KIDNEY DISEASE) STAGE 3, GFR 30-59 ML/MIN (HCC): ICD-10-CM

## 2020-08-05 DIAGNOSIS — M81.0 SENILE OSTEOPOROSIS: ICD-10-CM

## 2020-08-05 PROCEDURE — 99213 OFFICE O/P EST LOW 20 MIN: CPT | Performed by: PHYSICAL MEDICINE & REHABILITATION

## 2020-08-05 RX ORDER — ACETAMINOPHEN 500 MG
500 TABLET ORAL EVERY 6 HOURS PRN
Status: ON HOLD | COMMUNITY
End: 2021-05-07

## 2020-08-05 NOTE — PROGRESS NOTES
[address]  Progress Note    CHIEF COMPLAINT:  Patient presents with:  Low Back Pain: pt here for follow up while in PT with good response.  pt c/o mild lower back pain radiating along the outer aspect of the Alcohol use: Yes        Comment: Rarely wine 1 glass      Drug use: Never      Sexual activity: Not on file      FAMILY HISTORY:   Family History   Problem Relation Age of Onset   • Heart Disorder Father 52        heart attack cause of death   • Musculo-sk Extremities: No lower extremity edema bilaterally   Spine: Pain with lumbar extension  Hips: Limited but painfree ROM, there is exquisite tenderness over the IT band bilaterally to light palpation  Neuro:   Cognition: alert & oriented x 3, attentive, abl Tab      RTC    No follow-ups on file. Discharge Instructions were provided as documented in AVS summary. The patient was in agreement with the assessment and plan. All questions were answered. There were no barriers to learning.         Adan Ludwig

## 2020-08-30 PROBLEM — M48.062 SPINAL STENOSIS OF LUMBAR REGION WITH NEUROGENIC CLAUDICATION: Status: ACTIVE | Noted: 2020-08-30

## 2020-08-30 PROBLEM — E66.3 PATIENT OVERWEIGHT: Status: ACTIVE | Noted: 2020-08-30

## 2020-08-30 PROBLEM — M16.0 PRIMARY OSTEOARTHRITIS OF BOTH HIPS: Status: ACTIVE | Noted: 2020-08-30

## 2020-08-30 PROBLEM — M76.32 ILIOTIBIAL BAND SYNDROME OF LEFT SIDE: Status: ACTIVE | Noted: 2020-08-30

## 2020-08-30 PROBLEM — R26.9 GAIT DISTURBANCE: Status: ACTIVE | Noted: 2020-08-30

## 2020-09-01 ENCOUNTER — LAB ENCOUNTER (OUTPATIENT)
Dept: LAB | Age: 85
End: 2020-09-01
Attending: INTERNAL MEDICINE
Payer: MEDICARE

## 2020-09-01 DIAGNOSIS — R35.0 FREQUENCY OF URINATION: ICD-10-CM

## 2020-09-01 DIAGNOSIS — M54.16 CHRONIC LEFT LUMBAR RADICULOPATHY: ICD-10-CM

## 2020-09-01 DIAGNOSIS — E78.00 PURE HYPERCHOLESTEROLEMIA: ICD-10-CM

## 2020-09-01 DIAGNOSIS — N18.30 CKD (CHRONIC KIDNEY DISEASE) STAGE 3, GFR 30-59 ML/MIN (HCC): ICD-10-CM

## 2020-09-01 DIAGNOSIS — I10 ESSENTIAL HYPERTENSION: ICD-10-CM

## 2020-09-01 LAB
ALBUMIN SERPL-MCNC: 3.6 G/DL (ref 3.4–5)
ALBUMIN/GLOB SERPL: 0.8 {RATIO} (ref 1–2)
ALP LIVER SERPL-CCNC: 75 U/L (ref 55–142)
ALT SERPL-CCNC: 22 U/L (ref 13–56)
ANION GAP SERPL CALC-SCNC: 6 MMOL/L (ref 0–18)
AST SERPL-CCNC: 22 U/L (ref 15–37)
BASOPHILS # BLD AUTO: 0.05 X10(3) UL (ref 0–0.2)
BASOPHILS NFR BLD AUTO: 0.8 %
BILIRUB SERPL-MCNC: 0.5 MG/DL (ref 0.1–2)
BILIRUB UR QL: NEGATIVE
BUN BLD-MCNC: 24 MG/DL (ref 7–18)
BUN/CREAT SERPL: 19.5 (ref 10–20)
CALCIUM BLD-MCNC: 9.9 MG/DL (ref 8.5–10.1)
CHLORIDE SERPL-SCNC: 108 MMOL/L (ref 98–112)
CHOLEST SMN-MCNC: 197 MG/DL (ref ?–200)
CO2 SERPL-SCNC: 28 MMOL/L (ref 21–32)
COLOR UR: YELLOW
CREAT BLD-MCNC: 1.23 MG/DL (ref 0.55–1.02)
DEPRECATED RDW RBC AUTO: 44 FL (ref 35.1–46.3)
EOSINOPHIL # BLD AUTO: 0.17 X10(3) UL (ref 0–0.7)
EOSINOPHIL NFR BLD AUTO: 2.8 %
ERYTHROCYTE [DISTWIDTH] IN BLOOD BY AUTOMATED COUNT: 12.9 % (ref 11–15)
GLOBULIN PLAS-MCNC: 4.4 G/DL (ref 2.8–4.4)
GLUCOSE BLD-MCNC: 94 MG/DL (ref 70–99)
GLUCOSE UR-MCNC: NEGATIVE MG/DL
HCT VFR BLD AUTO: 42.6 % (ref 35–48)
HDLC SERPL-MCNC: 53 MG/DL (ref 40–59)
HGB BLD-MCNC: 13.9 G/DL (ref 12–16)
IMM GRANULOCYTES # BLD AUTO: 0 X10(3) UL (ref 0–1)
IMM GRANULOCYTES NFR BLD: 0 %
KETONES UR-MCNC: NEGATIVE MG/DL
LDLC SERPL CALC-MCNC: 122 MG/DL (ref ?–100)
LYMPHOCYTES # BLD AUTO: 2.13 X10(3) UL (ref 1–4)
LYMPHOCYTES NFR BLD AUTO: 35.6 %
M PROTEIN MFR SERPL ELPH: 8 G/DL (ref 6.4–8.2)
MCH RBC QN AUTO: 30.5 PG (ref 26–34)
MCHC RBC AUTO-ENTMCNC: 32.6 G/DL (ref 31–37)
MCV RBC AUTO: 93.4 FL (ref 80–100)
MONOCYTES # BLD AUTO: 0.46 X10(3) UL (ref 0.1–1)
MONOCYTES NFR BLD AUTO: 7.7 %
NEUTROPHILS # BLD AUTO: 3.17 X10 (3) UL (ref 1.5–7.7)
NEUTROPHILS # BLD AUTO: 3.17 X10(3) UL (ref 1.5–7.7)
NEUTROPHILS NFR BLD AUTO: 53.1 %
NITRITE UR QL STRIP.AUTO: NEGATIVE
NONHDLC SERPL-MCNC: 144 MG/DL (ref ?–130)
OSMOLALITY SERPL CALC.SUM OF ELEC: 298 MOSM/KG (ref 275–295)
PATIENT FASTING Y/N/NP: YES
PATIENT FASTING Y/N/NP: YES
PH UR: 6 [PH] (ref 5–8)
PLATELET # BLD AUTO: 230 10(3)UL (ref 150–450)
POTASSIUM SERPL-SCNC: 3.9 MMOL/L (ref 3.5–5.1)
RBC # BLD AUTO: 4.56 X10(6)UL (ref 3.8–5.3)
RBC #/AREA URNS AUTO: 14 /HPF
SODIUM SERPL-SCNC: 142 MMOL/L (ref 136–145)
SP GR UR STRIP: 1.02 (ref 1–1.03)
TRIGL SERPL-MCNC: 111 MG/DL (ref 30–149)
UROBILINOGEN UR STRIP-ACNC: <2
VLDLC SERPL CALC-MCNC: 22 MG/DL (ref 0–30)
WBC # BLD AUTO: 6 X10(3) UL (ref 4–11)
WBC #/AREA URNS AUTO: 95 /HPF

## 2020-09-01 PROCEDURE — 81001 URINALYSIS AUTO W/SCOPE: CPT

## 2020-09-01 PROCEDURE — 80053 COMPREHEN METABOLIC PANEL: CPT

## 2020-09-01 PROCEDURE — 87077 CULTURE AEROBIC IDENTIFY: CPT

## 2020-09-01 PROCEDURE — 36415 COLL VENOUS BLD VENIPUNCTURE: CPT

## 2020-09-01 PROCEDURE — 87086 URINE CULTURE/COLONY COUNT: CPT

## 2020-09-01 PROCEDURE — 80061 LIPID PANEL: CPT

## 2020-09-01 PROCEDURE — 85025 COMPLETE CBC W/AUTO DIFF WBC: CPT

## 2020-09-02 ENCOUNTER — OFFICE VISIT (OUTPATIENT)
Dept: NEUROLOGY | Facility: CLINIC | Age: 85
End: 2020-09-02
Payer: MEDICARE

## 2020-09-02 ENCOUNTER — TELEPHONE (OUTPATIENT)
Dept: NEPHROLOGY | Facility: CLINIC | Age: 85
End: 2020-09-02

## 2020-09-02 VITALS — BODY MASS INDEX: 35.03 KG/M2 | WEIGHT: 205.19 LBS | HEIGHT: 64 IN

## 2020-09-02 DIAGNOSIS — M48.062 SPINAL STENOSIS OF LUMBAR REGION WITH NEUROGENIC CLAUDICATION: Primary | ICD-10-CM

## 2020-09-02 DIAGNOSIS — M16.0 PRIMARY OSTEOARTHRITIS OF BOTH HIPS: ICD-10-CM

## 2020-09-02 DIAGNOSIS — R26.9 GAIT DISTURBANCE: ICD-10-CM

## 2020-09-02 DIAGNOSIS — E66.3 PATIENT OVERWEIGHT: ICD-10-CM

## 2020-09-02 DIAGNOSIS — K21.9 GASTROESOPHAGEAL REFLUX DISEASE, ESOPHAGITIS PRESENCE NOT SPECIFIED: ICD-10-CM

## 2020-09-02 DIAGNOSIS — M81.0 SENILE OSTEOPOROSIS: ICD-10-CM

## 2020-09-02 DIAGNOSIS — N18.30 CKD (CHRONIC KIDNEY DISEASE) STAGE 3, GFR 30-59 ML/MIN (HCC): ICD-10-CM

## 2020-09-02 PROCEDURE — 99213 OFFICE O/P EST LOW 20 MIN: CPT | Performed by: PHYSICAL MEDICINE & REHABILITATION

## 2020-09-02 RX ORDER — AMOXICILLIN 500 MG/1
500 TABLET, FILM COATED ORAL 2 TIMES DAILY
Qty: 14 TABLET | Refills: 0 | Status: SHIPPED | OUTPATIENT
Start: 2020-09-02 | End: 2020-09-09

## 2020-09-02 RX ORDER — ATORVASTATIN CALCIUM 20 MG/1
20 TABLET, FILM COATED ORAL NIGHTLY
COMMUNITY
End: 2020-09-14

## 2020-09-02 NOTE — TELEPHONE ENCOUNTER
Labs overall look stable but she does have a UTI. Start amoxicillin 500 mg twice daily x7 days. Also see soon for routine 6-month follow-up.

## 2020-09-02 NOTE — TELEPHONE ENCOUNTER
Patient contacted. Results message relayed. Patient suspected that she had a UTI. Prescription e-scripted to pharmacy on file. She already has a follow up appointment scheduled.

## 2020-09-02 NOTE — PROGRESS NOTES
[address]  Progress Note    CHIEF COMPLAINT:  Patient presents with:  Low Back Pain: Patient presents to follow up on low back problems. States that she is much better.  Statest that she only has pain when w 1 glass      Drug use: Never      Sexual activity: Not on file      FAMILY HISTORY:   Family History   Problem Relation Age of Onset   • Heart Disorder Father 52        heart attack cause of death   • Musculo-skelatal Disorder Mother         osteoporosis distress  Eyes: Extra-occular movements intact. Heart: peripheral pulses intact. Normal capillary refill.    Extremities: No lower extremity edema bilaterally   Spine: Pain-free and slightly limited in all directions  Neuro:   Cognition: alert & oriented agreement with the assessment and plan. All questions were answered. There were no barriers to learning.         Bhavani Zendejas MD  Physical Medicine and Rehabilitation/Sports Medicine  MEDICAL CENTER [facility]

## 2020-09-10 ENCOUNTER — MED REC SCAN ONLY (OUTPATIENT)
Dept: NEUROLOGY | Facility: CLINIC | Age: 85
End: 2020-09-10

## 2020-09-14 ENCOUNTER — OFFICE VISIT (OUTPATIENT)
Dept: NEPHROLOGY | Facility: CLINIC | Age: 85
End: 2020-09-14
Payer: MEDICARE

## 2020-09-14 VITALS
HEART RATE: 54 BPM | WEIGHT: 210 LBS | TEMPERATURE: 98 F | SYSTOLIC BLOOD PRESSURE: 139 MMHG | HEIGHT: 64 IN | BODY MASS INDEX: 35.85 KG/M2 | DIASTOLIC BLOOD PRESSURE: 68 MMHG

## 2020-09-14 DIAGNOSIS — I10 ESSENTIAL HYPERTENSION: ICD-10-CM

## 2020-09-14 DIAGNOSIS — M54.16 CHRONIC LEFT LUMBAR RADICULOPATHY: ICD-10-CM

## 2020-09-14 DIAGNOSIS — E78.00 PURE HYPERCHOLESTEROLEMIA: ICD-10-CM

## 2020-09-14 DIAGNOSIS — N18.30 CKD (CHRONIC KIDNEY DISEASE) STAGE 3, GFR 30-59 ML/MIN (HCC): Primary | ICD-10-CM

## 2020-09-14 PROCEDURE — G0463 HOSPITAL OUTPT CLINIC VISIT: HCPCS | Performed by: INTERNAL MEDICINE

## 2020-09-14 PROCEDURE — 99214 OFFICE O/P EST MOD 30 MIN: CPT | Performed by: INTERNAL MEDICINE

## 2020-09-14 NOTE — PROGRESS NOTES
[facility]  Nephrology Daily Progress Note    Beverley Bunn  [id number]  80year old      HPI:   Beverley Bunn is a 80year old female.   27-year-old female with a history of hypertension, PSVT followed by Dr. Chemo Herrera, early carotid ar 09/14/20 1312 210 lb (95.3 kg)       Constitutional: appears well hydrated alert and responsive no acute distress noted  Neck/Thyroid: neck is supple without adenopathy  Lymphatic: no abnormal cervical, supraclavicular or axillary adenopathy is noted  Re involving multiple sites     Low back pain     Senile osteoporosis     Patient overweight     Spinal stenosis of lumbar region with neurogenic claudication     Primary osteoarthritis of both hips     Gait disturbance     Iliotibial band syndrome of left si

## 2020-09-18 ENCOUNTER — TELEPHONE (OUTPATIENT)
Dept: NEPHROLOGY | Facility: CLINIC | Age: 85
End: 2020-09-18

## 2020-09-23 ENCOUNTER — TELEPHONE (OUTPATIENT)
Dept: NEPHROLOGY | Facility: CLINIC | Age: 85
End: 2020-09-23

## 2020-09-23 DIAGNOSIS — R30.0 BURNING WITH URINATION: Primary | ICD-10-CM

## 2020-09-23 RX ORDER — NITROFURANTOIN 25; 75 MG/1; MG/1
100 CAPSULE ORAL 2 TIMES DAILY
Qty: 14 CAPSULE | Refills: 0 | Status: SHIPPED | OUTPATIENT
Start: 2020-09-23 | End: 2020-09-30

## 2020-09-23 NOTE — TELEPHONE ENCOUNTER
Patient called in stating that she thinks she has a bladder infection and would like to get medication without having to come into the office.  Please advise thank you

## 2020-09-23 NOTE — TELEPHONE ENCOUNTER
Left message to call back--handicapped parking placard form is completed. Asking if we should mail it to patient or will someone pick it up at the office?

## 2020-09-23 NOTE — TELEPHONE ENCOUNTER
Orders entered in system. Patient contacted. She is aware to do urine testing first before starting antibiotic. Prescription e-scripted to pharmacy on file.

## 2020-09-23 NOTE — TELEPHONE ENCOUNTER
Patient contacted. Symptoms started Monday 9/21/2020 (previously was treated for UTI in early September and thought she was better) Symptoms: urinary frequency, bladder pressure, mild abdominal discomfort, chills, no fever that patient is aware of (no temp taken) no visible blood in urine. Encounter routed to Dr. Umberto Magallanes.

## 2020-09-24 ENCOUNTER — LAB ENCOUNTER (OUTPATIENT)
Dept: LAB | Age: 85
End: 2020-09-24
Attending: INTERNAL MEDICINE
Payer: MEDICARE

## 2020-09-24 DIAGNOSIS — R30.0 BURNING WITH URINATION: ICD-10-CM

## 2020-09-24 LAB
BILIRUB UR QL: NEGATIVE
COLOR UR: YELLOW
GLUCOSE UR-MCNC: NEGATIVE MG/DL
KETONES UR-MCNC: NEGATIVE MG/DL
NITRITE UR QL STRIP.AUTO: POSITIVE
PH UR: 6 [PH] (ref 5–8)
PROT UR-MCNC: 100 MG/DL
RBC #/AREA URNS AUTO: 36 /HPF
SP GR UR STRIP: 1.02 (ref 1–1.03)
UROBILINOGEN UR STRIP-ACNC: <2
WBC #/AREA URNS AUTO: 1051 /HPF

## 2020-09-24 PROCEDURE — 81001 URINALYSIS AUTO W/SCOPE: CPT

## 2020-09-24 PROCEDURE — 87088 URINE BACTERIA CULTURE: CPT

## 2020-09-24 PROCEDURE — 87186 SC STD MICRODIL/AGAR DIL: CPT

## 2020-09-24 PROCEDURE — 87086 URINE CULTURE/COLONY COUNT: CPT

## 2020-11-22 ENCOUNTER — APPOINTMENT (OUTPATIENT)
Dept: GENERAL RADIOLOGY | Facility: HOSPITAL | Age: 85
End: 2020-11-22
Attending: EMERGENCY MEDICINE
Payer: MEDICARE

## 2020-11-22 ENCOUNTER — HOSPITAL ENCOUNTER (EMERGENCY)
Facility: HOSPITAL | Age: 85
Discharge: HOME | End: 2020-11-22
Attending: EMERGENCY MEDICINE
Payer: MEDICARE

## 2020-11-22 VITALS
SYSTOLIC BLOOD PRESSURE: 152 MMHG | RESPIRATION RATE: 18 BRPM | DIASTOLIC BLOOD PRESSURE: 64 MMHG | TEMPERATURE: 98 F | HEART RATE: 56 BPM | OXYGEN SATURATION: 98 %

## 2020-11-22 DIAGNOSIS — R07.9 RIGHT-SIDED CHEST PAIN: Primary | ICD-10-CM

## 2020-11-22 PROCEDURE — 36415 COLL VENOUS BLD VENIPUNCTURE: CPT

## 2020-11-22 PROCEDURE — 84484 ASSAY OF TROPONIN QUANT: CPT | Performed by: EMERGENCY MEDICINE

## 2020-11-22 PROCEDURE — 71045 X-RAY EXAM CHEST 1 VIEW: CPT | Performed by: EMERGENCY MEDICINE

## 2020-11-22 PROCEDURE — 93005 ELECTROCARDIOGRAM TRACING: CPT

## 2020-11-22 PROCEDURE — 93010 ELECTROCARDIOGRAM REPORT: CPT | Performed by: EMERGENCY MEDICINE

## 2020-11-22 PROCEDURE — 99284 EMERGENCY DEPT VISIT MOD MDM: CPT

## 2020-11-22 PROCEDURE — 80048 BASIC METABOLIC PNL TOTAL CA: CPT | Performed by: EMERGENCY MEDICINE

## 2020-11-22 PROCEDURE — 85025 COMPLETE CBC W/AUTO DIFF WBC: CPT | Performed by: EMERGENCY MEDICINE

## 2020-11-22 RX ORDER — ACETAMINOPHEN 500 MG
1000 TABLET ORAL ONCE
Status: COMPLETED | OUTPATIENT
Start: 2020-11-22 | End: 2020-11-22

## 2020-11-22 NOTE — ED PROVIDER NOTES
Patient Seen in: [facility] AND [facility] Emergency Department      History   Patient presents with:  Chest Pain Angina    Stated Complaint: chest pain     HPI    80-year-old female with history of SVT on diltiazem and metoprolol presents for evaluation of ches (Oral)   Resp 18   SpO2 98%         Physical Exam  Vitals signs and nursing note reviewed. Constitutional:       General: She is not in acute distress. Appearance: She is well-developed. HENT:      Head: Normocephalic and atraumatic.    Eyes:      C RAINBOW DRAW LAVENDER   RAINBOW DRAW LIGHT GREEN   RAINBOW DRAW GOLD   CBC W/ DIFFERENTIAL     EKG    Rate, intervals and axes as noted on EKG Report.   Rate: 59  Rhythm: Sinus Rhythm  Reading: Sinus rhythm with first-degree block, no ST elevation or depr reviewed and interpreted all ED vitals.           MDM      Differential Diagnosis includes but is not limited to: ACS, PE, pneumonia, pneumothorax, aortic aneurysm or dissection, GERD, musculoskeletal strain    Limitations of history:   able to obtain histo [address] [facility] [address]  [phone]    Call in 1 day  For follow up    We recommend that you schedule follow up care with a primary care provider within the next three months to obtain basic health screening including reassessment of your blood pressure.

## 2020-11-25 ENCOUNTER — TELEPHONE (OUTPATIENT)
Dept: NEPHROLOGY | Facility: CLINIC | Age: 85
End: 2020-11-25

## 2020-11-25 RX ORDER — LISINOPRIL AND HYDROCHLOROTHIAZIDE 12.5; 1 MG/1; MG/1
1 TABLET ORAL DAILY
Qty: 90 TABLET | Refills: 1 | Status: SHIPPED | OUTPATIENT
Start: 2020-11-25 | End: 2020-12-01

## 2020-11-25 NOTE — TELEPHONE ENCOUNTER
Current Outpatient Medications   Medication Sig Dispense Refill   • LISINOPRIL-HYDROCHLOROTHIAZIDE 10-12.5 MG Oral Tab TAKE 1 TABLET BY MOUTH DAILY 90 tablet 1

## 2020-12-01 RX ORDER — LISINOPRIL AND HYDROCHLOROTHIAZIDE 12.5; 1 MG/1; MG/1
1 TABLET ORAL DAILY
Qty: 90 TABLET | Refills: 1 | Status: SHIPPED | OUTPATIENT
Start: 2020-12-01 | End: 2021-05-25

## 2020-12-01 NOTE — TELEPHONE ENCOUNTER
Pt requesting to have script sent to [facility] located in [place], [place] on [address].  Ph. [phone]

## 2021-02-03 DIAGNOSIS — Z23 NEED FOR VACCINATION: ICD-10-CM

## 2021-02-06 ENCOUNTER — IMMUNIZATION (OUTPATIENT)
Dept: LAB | Age: 86
End: 2021-02-06
Attending: HOSPITALIST
Payer: MEDICARE

## 2021-02-06 DIAGNOSIS — Z23 NEED FOR VACCINATION: Primary | ICD-10-CM

## 2021-02-06 PROCEDURE — 0001A SARSCOV2 VAC 30MCG/0.3ML IM: CPT

## 2021-02-27 ENCOUNTER — IMMUNIZATION (OUTPATIENT)
Dept: LAB | Age: 86
End: 2021-02-27
Attending: HOSPITALIST
Payer: MEDICARE

## 2021-02-27 DIAGNOSIS — Z23 NEED FOR VACCINATION: Primary | ICD-10-CM

## 2021-02-27 PROCEDURE — 0002A SARSCOV2 VAC 30MCG/0.3ML IM: CPT

## 2021-04-13 ENCOUNTER — OFFICE VISIT (OUTPATIENT)
Dept: NEPHROLOGY | Facility: CLINIC | Age: 86
End: 2021-04-13
Payer: MEDICARE

## 2021-04-13 VITALS
WEIGHT: 215 LBS | BODY MASS INDEX: 36.7 KG/M2 | RESPIRATION RATE: 18 BRPM | DIASTOLIC BLOOD PRESSURE: 79 MMHG | TEMPERATURE: 97 F | HEART RATE: 60 BPM | HEIGHT: 64 IN | SYSTOLIC BLOOD PRESSURE: 150 MMHG

## 2021-04-13 DIAGNOSIS — Z12.31 ENCOUNTER FOR SCREENING MAMMOGRAM FOR BREAST CANCER: ICD-10-CM

## 2021-04-13 DIAGNOSIS — M81.0 OSTEOPOROSIS, UNSPECIFIED OSTEOPOROSIS TYPE, UNSPECIFIED PATHOLOGICAL FRACTURE PRESENCE: ICD-10-CM

## 2021-04-13 DIAGNOSIS — I10 ESSENTIAL HYPERTENSION: ICD-10-CM

## 2021-04-13 DIAGNOSIS — N18.31 STAGE 3A CHRONIC KIDNEY DISEASE (HCC): Primary | ICD-10-CM

## 2021-04-13 DIAGNOSIS — N39.46 MIXED STRESS AND URGE URINARY INCONTINENCE: ICD-10-CM

## 2021-04-13 PROCEDURE — 99214 OFFICE O/P EST MOD 30 MIN: CPT | Performed by: INTERNAL MEDICINE

## 2021-04-14 NOTE — PATIENT INSTRUCTIONS
Please do laboratory studies, mammogram and bone density scan is ordered. See gynecology to see whether or not you have a prolapsed uterus and to discuss your urinary incontinence symptoms.

## 2021-04-14 NOTE — PROGRESS NOTES
[facility], [facility]  Nephrology Daily Progress Note    Celina Lipscomb  [id number]  80year old  Patient presents with:  Chronic Kidney Disease: 6 month follow up, feels well      HPI:   Celina Lipscomb is a 80year old female.   71-year-old female with a his History 11/22/2020 4/13/2021 4/13/2021   BP - - 150/79   Pulse 56 - 60   Resp 18 - 18   Temp - - 96.9   SpO2 98 - -   Weight - - 215 lbs   Height - - 64   BODY MASS INDEX - 36.9 -       VITALS: WEIGHT ONLY 9/9/2020 9/14/2020 4/13/2021   Weight 211 lbs 210 daily., Disp: 90 capsule, Rfl: 3  •  acetaminophen 500 MG Oral Tab, Take 500 mg by mouth every 6 (six) hours as needed for Pain., Disp: , Rfl:   •  aspirin 81 MG Oral Tab, Take 81 mg by mouth daily. , Disp: , Rfl:   •  magnesium 250 MG Oral Tab, Take 250 mg check her out to rule out a prolapsed uterus and discuss options for urinary incontinence. Due for follow-up mammogram and DEXA scan. They were ordered. Return in 6 months.          Orders Placed This Encounter      CBC W Differential W Platelet      Alexander

## 2021-04-15 ENCOUNTER — LAB ENCOUNTER (OUTPATIENT)
Dept: LAB | Age: 86
End: 2021-04-15
Attending: INTERNAL MEDICINE
Payer: MEDICARE

## 2021-04-15 DIAGNOSIS — N18.31 STAGE 3A CHRONIC KIDNEY DISEASE (HCC): ICD-10-CM

## 2021-04-15 DIAGNOSIS — I10 ESSENTIAL HYPERTENSION: ICD-10-CM

## 2021-04-15 PROCEDURE — 80069 RENAL FUNCTION PANEL: CPT

## 2021-04-15 PROCEDURE — 36415 COLL VENOUS BLD VENIPUNCTURE: CPT

## 2021-04-15 PROCEDURE — 85025 COMPLETE CBC W/AUTO DIFF WBC: CPT

## 2021-04-15 PROCEDURE — 80061 LIPID PANEL: CPT

## 2021-04-27 ENCOUNTER — OFFICE VISIT (OUTPATIENT)
Dept: UROLOGY | Facility: HOSPITAL | Age: 86
End: 2021-04-27
Attending: OBSTETRICS & GYNECOLOGY
Payer: MEDICARE

## 2021-04-27 VITALS
SYSTOLIC BLOOD PRESSURE: 136 MMHG | RESPIRATION RATE: 16 BRPM | DIASTOLIC BLOOD PRESSURE: 72 MMHG | WEIGHT: 215 LBS | BODY MASS INDEX: 37 KG/M2

## 2021-04-27 DIAGNOSIS — N39.41 URGE INCONTINENCE: Primary | ICD-10-CM

## 2021-04-27 DIAGNOSIS — K59.00 CONSTIPATION, UNSPECIFIED CONSTIPATION TYPE: ICD-10-CM

## 2021-04-27 DIAGNOSIS — R35.1 NOCTURIA: ICD-10-CM

## 2021-04-27 DIAGNOSIS — R82.90 ABNORMAL URINALYSIS: ICD-10-CM

## 2021-04-27 DIAGNOSIS — N81.11 CYSTOCELE, MIDLINE: ICD-10-CM

## 2021-04-27 PROCEDURE — 81002 URINALYSIS NONAUTO W/O SCOPE: CPT

## 2021-04-27 PROCEDURE — 99212 OFFICE O/P EST SF 10 MIN: CPT

## 2021-04-27 PROCEDURE — 87077 CULTURE AEROBIC IDENTIFY: CPT | Performed by: OBSTETRICS & GYNECOLOGY

## 2021-04-27 PROCEDURE — 51701 INSERT BLADDER CATHETER: CPT

## 2021-04-27 PROCEDURE — 87086 URINE CULTURE/COLONY COUNT: CPT | Performed by: OBSTETRICS & GYNECOLOGY

## 2021-04-27 PROCEDURE — 87186 SC STD MICRODIL/AGAR DIL: CPT | Performed by: OBSTETRICS & GYNECOLOGY

## 2021-04-27 RX ORDER — TROSPIUM CHLORIDE ER 60 MG/1
60 CAPSULE ORAL DAILY
Qty: 30 CAPSULE | Refills: 3 | Status: SHIPPED | OUTPATIENT
Start: 2021-04-27 | End: 2021-06-22 | Stop reason: DRUGHIGH

## 2021-04-27 NOTE — PROGRESS NOTES
ID: Margarita Oviedo  : 1933  Date: 2021     Referred by Dr. Peyton Rodrigez MD    Patient presents with:  Prolapse: referred by Dr. Dasha John      HPI:  The patient is a 80year-old female, Fl[place] (vaginal deliveries), who presents for evaluatio osteoporosis      Social History    Tobacco Use      Smoking status: Never Smoker      Smokeless tobacco: Never Used    Vaping Use      Vaping Use: Never used    Alcohol use: Yes      Comment: Rarely wine 1 glass    Drug use: Never       Allergies: varicosities or lesions. SKIN:  Warm and dry, with good color and turgor. No lesions. PELVIC EXAM:  External Genitalia: Normal appearance for age.  No atrophy, no lesions  Urethra: + atrophy, non tender  Bladder: no fullness, non tender  Vagina: + atro results of urine culture as she will probably need antibiotics.       Devan Mendoza MD, [facility]  Female Pelvic Medicine and  Reconstructive Surgery (Urogynecology)  [phone] (Pager)

## 2021-04-29 ENCOUNTER — TELEPHONE (OUTPATIENT)
Dept: UROLOGY | Facility: HOSPITAL | Age: 86
End: 2021-04-29

## 2021-04-29 RX ORDER — NITROFURANTOIN 25; 75 MG/1; MG/1
100 CAPSULE ORAL 2 TIMES DAILY
Qty: 14 CAPSULE | Refills: 0 | Status: SHIPPED | OUTPATIENT
Start: 2021-04-29 | End: 2021-05-08

## 2021-04-29 NOTE — TELEPHONE ENCOUNTER
Called patient and informed her that the urine culture obtained showed E coli which is an infection that needs to be treated with an antibiotic. ALINA Elliott 100 mg po bid x 7 days.  This prescription was sent to [facility] which is h

## 2021-04-30 ENCOUNTER — TELEPHONE (OUTPATIENT)
Dept: UROLOGY | Facility: HOSPITAL | Age: 86
End: 2021-04-30

## 2021-04-30 NOTE — TELEPHONE ENCOUNTER
Spoke to patient letting her know we are working on a prior auth to see if Amada Hammans will cover, states it is $100 for a 30 day supply, she cannot afford, PEND for Amada Hammans decision.

## 2021-04-30 NOTE — TELEPHONE ENCOUNTER
Patient needs Prior authorization for Trospium CL 60 mg ER 1 capsule in am on empty stomach. Pt ID [id number]  TORB Dr Padilla Side needs to take due to higher risk of cognitive issues with other anticholinergics.   Also has HTN and on 3 blood pressure medications

## 2021-05-06 ENCOUNTER — APPOINTMENT (OUTPATIENT)
Dept: CT IMAGING | Facility: HOSPITAL | Age: 86
End: 2021-05-06
Attending: EMERGENCY MEDICINE
Payer: MEDICARE

## 2021-05-06 ENCOUNTER — TELEPHONE (OUTPATIENT)
Dept: NEPHROLOGY | Facility: CLINIC | Age: 86
End: 2021-05-06

## 2021-05-06 ENCOUNTER — TELEPHONE (OUTPATIENT)
Dept: UROLOGY | Facility: HOSPITAL | Age: 86
End: 2021-05-06

## 2021-05-06 ENCOUNTER — APPOINTMENT (OUTPATIENT)
Dept: GENERAL RADIOLOGY | Facility: HOSPITAL | Age: 86
End: 2021-05-06
Attending: EMERGENCY MEDICINE
Payer: MEDICARE

## 2021-05-06 ENCOUNTER — HOSPITAL ENCOUNTER (OUTPATIENT)
Facility: HOSPITAL | Age: 86
Setting detail: OBSERVATION
Discharge: HOME | End: 2021-05-08
Attending: EMERGENCY MEDICINE | Admitting: HOSPITALIST
Payer: MEDICARE

## 2021-05-06 DIAGNOSIS — R50.81 FEVER IN OTHER DISEASES: ICD-10-CM

## 2021-05-06 DIAGNOSIS — R10.9 ABDOMINAL PAIN, ACUTE: Primary | ICD-10-CM

## 2021-05-06 PROCEDURE — 74177 CT ABD & PELVIS W/CONTRAST: CPT | Performed by: EMERGENCY MEDICINE

## 2021-05-06 PROCEDURE — 71045 X-RAY EXAM CHEST 1 VIEW: CPT | Performed by: EMERGENCY MEDICINE

## 2021-05-06 PROCEDURE — 99219 INITIAL OBSERVATION CARE,LEVL II: CPT | Performed by: HOSPITALIST

## 2021-05-06 RX ORDER — SODIUM CHLORIDE 9 MG/ML
INJECTION, SOLUTION INTRAVENOUS ONCE
Status: COMPLETED | OUTPATIENT
Start: 2021-05-06 | End: 2021-05-08

## 2021-05-06 RX ORDER — ACETAMINOPHEN 325 MG/1
650 TABLET ORAL ONCE
Status: COMPLETED | OUTPATIENT
Start: 2021-05-06 | End: 2021-05-06

## 2021-05-06 NOTE — TELEPHONE ENCOUNTER
Pt states she has a fever of 108 and wants to know what she should do. .she has chills and stomach hurts, going to try to transfer to RN

## 2021-05-06 NOTE — ED PROVIDER NOTES
Patient Seen in: [facility] AND [facility] Emergency Department    History   Patient presents with:  Abdomen/Flank Pain  Fever  Headache    Stated Complaint: abdominal pain, fever 100.8, headache    HPI    Patient is here with complaint of upper abdominal pain s niacin 500 MG Oral Tab,  Take 500 mg by mouth as needed. Trospium Chloride ER 60 MG Oral Capsule SR 24 Hr,  Take 1 capsule (60 mg total) by mouth daily.  Take first thing in the morning on an empty stomach         Review of Systems  HEENT: No cough, no physician. Also did note the constipation. She had some mild elevation of her liver enzymes and elevation of her white count.   She was still just not feeling well in general.  Waiting for urinalysis we discussed admission and they are in agreement I did limits   BASIC METABOLIC PANEL (8) - Abnormal; Notable for the following components:    BUN 26 (*)     Creatinine 1.19 (*)     BUN/CREA Ratio 21.8 (*)     Calculated Osmolality 299 (*)     GFR, Non- 41 (*)     GFR, -American 47 (*) WITH PLATELET    Narrative: The following orders were created for panel order CBC WITH DIFFERENTIAL WITH PLATELET.   Procedure                               Abnormality         Status                     ---------                               ---------

## 2021-05-06 NOTE — ED INITIAL ASSESSMENT (HPI)
Abdominal pain since yesterday. Notes fever this morning of 100.8  Notes nausea    Denies diarrhea. Recent UTI, on last day of antibiotic.

## 2021-05-06 NOTE — TELEPHONE ENCOUNTER
YAMILA Trent, pt aware you are out of office. Spoke with patient. States she has a fever of 100.8 and is experiencing body aches, chills, and nausea. Denies SOB, chest palpitations. States she was diagnosed with UTI recently and on last day of abx.

## 2021-05-06 NOTE — TELEPHONE ENCOUNTER
Called and spoke to patient. Informed patient that a prior authorization has been approved for the medication Trospium Chloride ER 60 mg po. Patient verbalized understanding. Encouraged patient to call with questions or concerns.

## 2021-05-06 NOTE — TELEPHONE ENCOUNTER
Patient called and stated even though the prior authorization has been approved. The  monthly cost (118.00) is still to expensive to fill the prescription. Will reach out to Dr. Ofe Brewer for other options and call patient with options.  Patient verbalized un

## 2021-05-07 ENCOUNTER — APPOINTMENT (OUTPATIENT)
Dept: GENERAL RADIOLOGY | Facility: HOSPITAL | Age: 86
End: 2021-05-07
Attending: HOSPITALIST
Payer: MEDICARE

## 2021-05-07 ENCOUNTER — TELEPHONE (OUTPATIENT)
Dept: UROLOGY | Facility: HOSPITAL | Age: 86
End: 2021-05-07

## 2021-05-07 ENCOUNTER — APPOINTMENT (OUTPATIENT)
Dept: ULTRASOUND IMAGING | Facility: HOSPITAL | Age: 86
End: 2021-05-07
Attending: HOSPITALIST
Payer: MEDICARE

## 2021-05-07 PROBLEM — R50.81 FEVER IN OTHER DISEASES: Status: ACTIVE | Noted: 2021-05-07

## 2021-05-07 PROCEDURE — 99226 SUBSEQUENT OBSERVATION CARE: CPT | Performed by: HOSPITALIST

## 2021-05-07 PROCEDURE — 93970 EXTREMITY STUDY: CPT | Performed by: HOSPITALIST

## 2021-05-07 PROCEDURE — 71046 X-RAY EXAM CHEST 2 VIEWS: CPT | Performed by: HOSPITALIST

## 2021-05-07 RX ORDER — METOPROLOL SUCCINATE 50 MG/1
50 TABLET, EXTENDED RELEASE ORAL DAILY
Status: DISCONTINUED | OUTPATIENT
Start: 2021-05-07 | End: 2021-05-08

## 2021-05-07 RX ORDER — POTASSIUM CHLORIDE 1.5 G/1.77G
40 POWDER, FOR SOLUTION ORAL ONCE
Status: COMPLETED | OUTPATIENT
Start: 2021-05-07 | End: 2021-05-07

## 2021-05-07 RX ORDER — ASPIRIN 81 MG/1
81 TABLET ORAL DAILY
Status: DISCONTINUED | OUTPATIENT
Start: 2021-05-07 | End: 2021-05-08

## 2021-05-07 RX ORDER — ALBUTEROL SULFATE 2.5 MG/3ML
2.5 SOLUTION RESPIRATORY (INHALATION) EVERY 6 HOURS PRN
Status: DISCONTINUED | OUTPATIENT
Start: 2021-05-07 | End: 2021-05-08

## 2021-05-07 RX ORDER — ACETAMINOPHEN 325 MG/1
650 TABLET ORAL EVERY 6 HOURS PRN
Status: DISCONTINUED | OUTPATIENT
Start: 2021-05-07 | End: 2021-05-08

## 2021-05-07 RX ORDER — METOCLOPRAMIDE HYDROCHLORIDE 5 MG/ML
5 INJECTION INTRAMUSCULAR; INTRAVENOUS EVERY 8 HOURS PRN
Status: DISCONTINUED | OUTPATIENT
Start: 2021-05-07 | End: 2021-05-08

## 2021-05-07 RX ORDER — ONDANSETRON 2 MG/ML
4 INJECTION INTRAMUSCULAR; INTRAVENOUS EVERY 6 HOURS PRN
Status: DISCONTINUED | OUTPATIENT
Start: 2021-05-07 | End: 2021-05-08

## 2021-05-07 RX ORDER — OXYBUTYNIN CHLORIDE 10 MG/1
10 TABLET, EXTENDED RELEASE ORAL DAILY
Refills: 3 | Status: DISCONTINUED | OUTPATIENT
Start: 2021-05-07 | End: 2021-05-08

## 2021-05-07 RX ORDER — HEPARIN SODIUM 5000 [USP'U]/ML
5000 INJECTION, SOLUTION INTRAVENOUS; SUBCUTANEOUS EVERY 12 HOURS SCHEDULED
Status: DISCONTINUED | OUTPATIENT
Start: 2021-05-07 | End: 2021-05-08

## 2021-05-07 RX ORDER — LISINOPRIL AND HYDROCHLOROTHIAZIDE 12.5; 1 MG/1; MG/1
1 TABLET ORAL DAILY
Status: DISCONTINUED | OUTPATIENT
Start: 2021-05-07 | End: 2021-05-07

## 2021-05-07 RX ORDER — DILTIAZEM HYDROCHLORIDE 120 MG/1
240 CAPSULE, EXTENDED RELEASE ORAL DAILY
Status: DISCONTINUED | OUTPATIENT
Start: 2021-05-07 | End: 2021-05-08

## 2021-05-07 RX ORDER — MULTIVITAMIN/IRON/FOLIC ACID 18MG-0.4MG
250 TABLET ORAL DAILY
Status: DISCONTINUED | OUTPATIENT
Start: 2021-05-07 | End: 2021-05-08

## 2021-05-07 NOTE — PROGRESS NOTES
[facility]    Progress Note    Ronnie Campbell Patient Status:  Observation    1933 MRN [id number]   Location [facility] 5SW/SE Attending Ashley Navarrete MD   Hosp Day # 0 PCP Martha Galarza MD       Subjective:   Samantha Green tablet ([facility] only)   Oral Daily       Current PRN Inpatient Meds:      acetaminophen, ondansetron HCl, Metoclopramide HCl, albuterol sulfate    Results:     Recent Labs   Lab 05/06/21  1711 05/07/21  0538   RBC 4.36 4.11   HGB 13.2 12.7   HCT 40.0 38.1   MC containing omental fat and vascularity. 5. Colonic diverticulosis . Moderate amount of stool in colon. 6. Small hiatal hernia.     Dictated by (CST): Kathleen Perkins MD on 5/06/2021 at 6:58 PM     Finalized by (CST): Kathleen Perkins MD on 5/06/2021 at 7:07 P

## 2021-05-07 NOTE — ED QUICK NOTES
Orders for admission, patient is aware of plan and ready to go upstairs. Any questions, please call ED RN Ira Shah  at extension [phone].   Type of COVID test sent:Rapid  COVID Suspicion level: Low    Titratable drug(s) infusin.9NS   Rate:83ml/hr  LOC at t

## 2021-05-07 NOTE — PLAN OF CARE
Problem: Patient Centered Care  Goal: Patient preferences are identified and integrated in the patient's plan of care  Description: Interventions:  - What would you like us to know as we care for you?  I live at home alone  - Provide timely, complete, and as needed  - Establish a toileting routine/schedule  - Consider collaborating with pharmacy to review patient's medication profile  Outcome: Progressing     VSS. No nausea upon admission. Patient oriented to unit. No complaint of pain. Med rec complete.  Pa

## 2021-05-07 NOTE — H&P
[facility]    PATIENT'S NAME: Lily GOMEZ   ATTENDING PHYSICIAN: Isaac Shelley.  Asuncion Edmondson MD   PATIENT ACCOUNT#:   [id number]    LOCATION:  [place] [address]  MEDICAL RECORD #:   [id number]       YOB: 1933  ADMISSION DATE:       05/06/20 and low-grade fever for the last week, at least.  She feels a bit out of breath at times. No other specific symptoms. She had some epigastric discomfort earlier today, but she had a bowel movement earlier.   She did not eat anything today because she felt

## 2021-05-08 VITALS
OXYGEN SATURATION: 100 % | BODY MASS INDEX: 37 KG/M2 | DIASTOLIC BLOOD PRESSURE: 52 MMHG | HEART RATE: 63 BPM | SYSTOLIC BLOOD PRESSURE: 109 MMHG | WEIGHT: 217 LBS | TEMPERATURE: 98 F | RESPIRATION RATE: 16 BRPM

## 2021-05-08 PROCEDURE — 99217 OBSERVATION CARE DISCHARGE: CPT | Performed by: HOSPITALIST

## 2021-05-08 RX ORDER — DOXYCYCLINE HYCLATE 100 MG/1
100 CAPSULE ORAL 2 TIMES DAILY
Qty: 10 CAPSULE | Refills: 0 | Status: SHIPPED | OUTPATIENT
Start: 2021-05-08 | End: 2021-05-13

## 2021-05-08 NOTE — PLAN OF CARE
Problem: Patient Centered Care  Goal: Patient preferences are identified and integrated in the patient's plan of care  Description: Interventions:  - What would you like us to know as we care for you?  I live at home alone  - Provide timely, complete, and needed  - Establish a toileting routine/schedule  - Consider collaborating with pharmacy to review patient's medication profile  Outcome: Progressing     CXR ordered, MD ordered zosyn to start. Patient afebrile, up to chair today.

## 2021-05-08 NOTE — PLAN OF CARE
Patient cleared for discharge. Discharge instructions and medications reviewed. All questions answered. Daughter here to provide transportation.

## 2021-05-08 NOTE — PLAN OF CARE
Problem: Patient Centered Care  Goal: Patient preferences are identified and integrated in the patient's plan of care  Description: Interventions:  - What would you like us to know as we care for you?  I live at home alone  - Provide timely, complete, and needed  - Establish a toileting routine/schedule  - Consider collaborating with pharmacy to review patient's medication profile  Outcome: Progressing     VSS. IV Zosyn started. No complaint of pain.  Patient resting in bed, call light within reach, non skid

## 2021-05-08 NOTE — DISCHARGE SUMMARY
[facility]    Discharge Summary    Donnald Dire Patient Status:  Observation    1933 MRN [id number]   Location [facility] 5SW/SE Attending Roosvelt Habermann, MD   Hosp Day # 0 PCP Remington Azevedo MD     Date of Admissio confusion, cough. Would rec follow up xrays in 3-4 weeks to document resolution. COVID negative            CULTURE:   Hospital Encounter on 05/06/21   1.  BLOOD CULTURE     Status: None (Preliminary result)    Collection Time: 05/06/21  8:34 PM    [place] pelvis. 2. 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  Suggest correlation with follow-up nonemergent MRI/MRCP in 3 months. 3. Trace bibasilar pleural effusions with compressive atelectasis.  4. Left paramedi Instructions Prescription details   Doxycycline Hyclate 100 MG Caps  Commonly known as: VIBRAMYCIN      Take 1 capsule (100 mg total) by mouth 2 (two) times daily for 5 days. Stop taking on:  May 13, 2021  Quantity: 10 capsule  Refills: 0        CONTINUE

## 2021-05-08 NOTE — PLAN OF CARE
No acute changes. Patient anticipating discharge to home after antibiotic is complete this afternoon. Safety precautions maintained. Call light within reach. Family at bedside and updated with plan of care.     Problem: Patient Centered Care  Goal: Patient bowel function  Description: INTERVENTIONS:  - Assess bowel function  - Maintain adequate hydration with IV or PO as ordered and tolerated  - Evaluate effectiveness of GI medications  - Encourage mobilization and activity  - Obtain nutritional consult as n

## 2021-05-10 ENCOUNTER — TELEPHONE (OUTPATIENT)
Dept: UROLOGY | Facility: HOSPITAL | Age: 86
End: 2021-05-10

## 2021-05-10 ENCOUNTER — PATIENT OUTREACH (OUTPATIENT)
Dept: CASE MANAGEMENT | Age: 86
End: 2021-05-10

## 2021-05-10 ENCOUNTER — TELEPHONE (OUTPATIENT)
Dept: NEPHROLOGY | Facility: CLINIC | Age: 86
End: 2021-05-10

## 2021-05-10 DIAGNOSIS — Z02.9 ENCOUNTERS FOR UNSPECIFIED ADMINISTRATIVE PURPOSE: ICD-10-CM

## 2021-05-10 DIAGNOSIS — R10.9 ABDOMINAL PAIN, ACUTE: ICD-10-CM

## 2021-05-10 DIAGNOSIS — R50.81 FEVER IN OTHER DISEASES: ICD-10-CM

## 2021-05-10 DIAGNOSIS — J18.9 COMMUNITY ACQUIRED PNEUMONIA, UNSPECIFIED LATERALITY: ICD-10-CM

## 2021-05-10 DIAGNOSIS — D72.829 LEUKOCYTOSIS, UNSPECIFIED TYPE: ICD-10-CM

## 2021-05-10 PROCEDURE — 1111F DSCHRG MED/CURRENT MED MERGE: CPT

## 2021-05-10 NOTE — PROGRESS NOTES
Initial Post Discharge Follow Up   Discharge Date: 5/8/21  Contact Date: 5/10/2021    Consent Verification:  Assessment Completed With: Patient  HIPAA Verified?   Yes    Discharge Dx:     1.       Low-grade fever, leukocytosis with fatigue secondary to a Hyclate 100 MG Oral Cap Take 1 capsule (100 mg total) by mouth 2 (two) times daily for 5 days. 10 capsule 0   • Trospium Chloride ER 60 MG Oral Capsule SR 24 Hr Take 1 capsule (60 mg total) by mouth daily.  Take first thing in the morning on an empty stomac appointments     Date & Time Appointment Department [facility])    Jun 22, 2021 11:00 AM CDT Uro Follow Up Pre/Post Op with Nigel Christiansen MD [facility] ([address])    The [facility] when to call 911. Patient verbalized understanding of these. NCM instructed patient to call PCP with any questions or needs, she states she will.     CCM referral placed:  No    BOOK BY DATE:  5/22/2021    [x]  Discharge Summary, Discharge medications revie

## 2021-05-10 NOTE — TELEPHONE ENCOUNTER
Dr. Kisha Morgan has no available appointments until 6/15/21. Unable to meet the [facility] deadline. Patient contacted.  Appointment booked for Monday 6/14/21 at 3:40 pm.

## 2021-05-10 NOTE — TELEPHONE ENCOUNTER
Called patient informed her that she will need to call her insurance company and ask them what medications they will cover for over active bladder. Patient to then call our office with the list of medications that her insurance will cover.  Dr. Bruno Oquendo will

## 2021-05-10 NOTE — TELEPHONE ENCOUNTER
\" Patient needs TCM appt scheduled by 5/15/2021 preferred, no later than 5/22/2021.  Please call pt to schedule TCM visit type\"

## 2021-05-25 RX ORDER — LISINOPRIL AND HYDROCHLOROTHIAZIDE 12.5; 1 MG/1; MG/1
1 TABLET ORAL DAILY
Qty: 90 TABLET | Refills: 1 | Status: ON HOLD | OUTPATIENT
Start: 2021-05-25 | End: 2021-08-09

## 2021-06-14 ENCOUNTER — TELEPHONE (OUTPATIENT)
Dept: NEPHROLOGY | Facility: CLINIC | Age: 86
End: 2021-06-14

## 2021-06-14 ENCOUNTER — HOSPITAL ENCOUNTER (OUTPATIENT)
Dept: GENERAL RADIOLOGY | Facility: HOSPITAL | Age: 86
Discharge: HOME | End: 2021-06-14
Attending: INTERNAL MEDICINE
Payer: MEDICARE

## 2021-06-14 ENCOUNTER — OFFICE VISIT (OUTPATIENT)
Dept: NEPHROLOGY | Facility: CLINIC | Age: 86
End: 2021-06-14
Payer: MEDICARE

## 2021-06-14 VITALS
HEIGHT: 64 IN | HEART RATE: 63 BPM | BODY MASS INDEX: 36.7 KG/M2 | DIASTOLIC BLOOD PRESSURE: 63 MMHG | WEIGHT: 215 LBS | SYSTOLIC BLOOD PRESSURE: 116 MMHG

## 2021-06-14 DIAGNOSIS — J18.9 PNEUMONIA OF RIGHT LOWER LOBE DUE TO INFECTIOUS ORGANISM: ICD-10-CM

## 2021-06-14 DIAGNOSIS — J18.9 PNEUMONIA OF RIGHT LOWER LOBE DUE TO INFECTIOUS ORGANISM: Primary | ICD-10-CM

## 2021-06-14 DIAGNOSIS — I10 ESSENTIAL HYPERTENSION: ICD-10-CM

## 2021-06-14 DIAGNOSIS — K86.2 PANCREATIC CYST: ICD-10-CM

## 2021-06-14 DIAGNOSIS — N18.31 STAGE 3A CHRONIC KIDNEY DISEASE (HCC): ICD-10-CM

## 2021-06-14 PROCEDURE — 71046 X-RAY EXAM CHEST 2 VIEWS: CPT | Performed by: INTERNAL MEDICINE

## 2021-06-14 PROCEDURE — 1111F DSCHRG MED/CURRENT MED MERGE: CPT | Performed by: INTERNAL MEDICINE

## 2021-06-14 PROCEDURE — 99214 OFFICE O/P EST MOD 30 MIN: CPT | Performed by: INTERNAL MEDICINE

## 2021-06-14 RX ORDER — HYDROCHLOROTHIAZIDE 12.5 MG/1
12.5 TABLET ORAL DAILY
Qty: 90 TABLET | Refills: 1 | Status: SHIPPED | OUTPATIENT
Start: 2021-06-14 | End: 2021-12-30

## 2021-06-14 RX ORDER — PANTOPRAZOLE SODIUM 40 MG/1
40 TABLET, DELAYED RELEASE ORAL
Qty: 30 TABLET | Refills: 3 | Status: SHIPPED | OUTPATIENT
Start: 2021-06-14 | End: 2021-11-23

## 2021-06-14 NOTE — TELEPHONE ENCOUNTER
Tell the patient I reviewed her CT scan of the abdomen which she did during her last hospitalization. It did show a cyst in the pancreas. This is probably a benign lesion but repeat an MRI of the pancreas in 3 months to make sure it remains stable.   I or

## 2021-06-14 NOTE — PATIENT INSTRUCTIONS
Do chest x-ray as ordered. Add the additional dose of hydrochlorothiazide. Let me know if your swelling in your feet does not improve. Start Protonix and let me know if it is not helping her sore throat or heartburn.   Do the MRI of the pancreas in 3 mon

## 2021-06-22 ENCOUNTER — OFFICE VISIT (OUTPATIENT)
Dept: UROLOGY | Facility: HOSPITAL | Age: 86
End: 2021-06-22
Attending: OBSTETRICS & GYNECOLOGY
Payer: MEDICARE

## 2021-06-22 VITALS
HEIGHT: 64 IN | WEIGHT: 215 LBS | SYSTOLIC BLOOD PRESSURE: 136 MMHG | RESPIRATION RATE: 20 BRPM | BODY MASS INDEX: 36.7 KG/M2 | DIASTOLIC BLOOD PRESSURE: 62 MMHG

## 2021-06-22 DIAGNOSIS — N39.41 URGE INCONTINENCE: ICD-10-CM

## 2021-06-22 DIAGNOSIS — R35.1 NOCTURIA: Primary | ICD-10-CM

## 2021-06-22 PROCEDURE — 51701 INSERT BLADDER CATHETER: CPT

## 2021-06-22 PROCEDURE — 81002 URINALYSIS NONAUTO W/O SCOPE: CPT | Performed by: OBSTETRICS & GYNECOLOGY

## 2021-06-22 PROCEDURE — 87086 URINE CULTURE/COLONY COUNT: CPT | Performed by: OBSTETRICS & GYNECOLOGY

## 2021-06-22 PROCEDURE — 51701 INSERT BLADDER CATHETER: CPT | Performed by: OBSTETRICS & GYNECOLOGY

## 2021-06-22 PROCEDURE — 99212 OFFICE O/P EST SF 10 MIN: CPT

## 2021-06-22 PROCEDURE — 81002 URINALYSIS NONAUTO W/O SCOPE: CPT

## 2021-06-22 RX ORDER — TROSPIUM CHLORIDE 20 MG/1
20 TABLET, FILM COATED ORAL 2 TIMES DAILY
Qty: 60 TABLET | Refills: 3 | Status: SHIPPED | OUTPATIENT
Start: 2021-06-22 | End: 2021-09-08

## 2021-06-22 NOTE — PROGRESS NOTES
ID: Amelia Doll  : 1933  Date: 21      Patient presents with: Incontinence      HPI:  The patient is a 80year-old female,  (vaginal deliveries), who was last seen in the office on 21.  Please refer to previous office note for fu incontinence  Plan: [facility] UG POCT URINALYSIS DIPSTICK, URINE CULTURE,        ROUTINE, [facility] AMB UG STRAIGHT CATH PVR      Plan:  1. Discontinue Trospium ER 60 mg and start Trospium 20 mg PO bid. 2. Await results of urine culture and treat accordingly.   3. Fol

## 2021-07-10 ENCOUNTER — HOSPITAL ENCOUNTER (OUTPATIENT)
Dept: MAMMOGRAPHY | Facility: HOSPITAL | Age: 86
Discharge: HOME | End: 2021-07-10
Attending: INTERNAL MEDICINE
Payer: MEDICARE

## 2021-07-10 DIAGNOSIS — Z12.31 ENCOUNTER FOR SCREENING MAMMOGRAM FOR BREAST CANCER: ICD-10-CM

## 2021-07-10 PROCEDURE — 77067 SCR MAMMO BI INCL CAD: CPT | Performed by: INTERNAL MEDICINE

## 2021-08-02 ENCOUNTER — OFFICE VISIT (OUTPATIENT)
Dept: SURGERY | Facility: CLINIC | Age: 86
End: 2021-08-02
Payer: MEDICARE

## 2021-08-02 DIAGNOSIS — N39.41 URGENCY INCONTINENCE: ICD-10-CM

## 2021-08-02 DIAGNOSIS — R35.0 FREQUENCY OF MICTURITION: ICD-10-CM

## 2021-08-02 DIAGNOSIS — R82.90 URINE FINDING: Primary | ICD-10-CM

## 2021-08-02 DIAGNOSIS — R35.1 NOCTURIA: ICD-10-CM

## 2021-08-02 DIAGNOSIS — N39.0 RECURRENT UTI: ICD-10-CM

## 2021-08-02 LAB
BILIRUB UR QL: NEGATIVE
BILIRUBIN: NEGATIVE
COLOR UR: YELLOW
GLUCOSE (URINE DIPSTICK): NEGATIVE MG/DL
GLUCOSE UR-MCNC: NEGATIVE MG/DL
KETONES (URINE DIPSTICK): NEGATIVE MG/DL
KETONES UR-MCNC: NEGATIVE MG/DL
MULTISTIX LOT#: ABNORMAL NUMERIC
NITRITE UR QL STRIP.AUTO: NEGATIVE
NITRITE, URINE: NEGATIVE
PH UR: 5 [PH] (ref 5–8)
PH, URINE: 5 (ref 4.5–8)
PROT UR-MCNC: 100 MG/DL
PROTEIN (URINE DIPSTICK): 100 MG/DL
RBC #/AREA URNS AUTO: >10 /HPF
SP GR UR STRIP: 1.02 (ref 1–1.03)
SPECIFIC GRAVITY: >=1.03 (ref 1–1.03)
URINE-COLOR: YELLOW
UROBILINOGEN UR STRIP-ACNC: <2
UROBILINOGEN,SEMI-QN: 0.2 MG/DL (ref 0–1.9)
WBC #/AREA URNS AUTO: >50 /HPF
WBC CLUMPS UR QL AUTO: PRESENT /HPF

## 2021-08-02 PROCEDURE — 81003 URINALYSIS AUTO W/O SCOPE: CPT | Performed by: UROLOGY

## 2021-08-02 PROCEDURE — 99203 OFFICE O/P NEW LOW 30 MIN: CPT | Performed by: UROLOGY

## 2021-08-02 PROCEDURE — 51701 INSERT BLADDER CATHETER: CPT | Performed by: UROLOGY

## 2021-08-02 RX ORDER — SULFAMETHOXAZOLE AND TRIMETHOPRIM 800; 160 MG/1; MG/1
TABLET ORAL
Qty: 28 TABLET | Refills: 1 | Status: ON HOLD | OUTPATIENT
Start: 2021-08-02 | End: 2021-08-09

## 2021-08-02 NOTE — PROGRESS NOTES
Rooming Clinician:     Ousmane Lincoln is a 80year old female.   Patient presents with:  Consult: c/o incontinence  Incontinence:  Stream: average  Daytime frequency: q 1/2 hr  Nocturia 0 times wets be  Because can't make it  Empty after urination: No morning before breakfast. 30 tablet 3   • hydrochlorothiazide 12.5 MG Oral Tab Take 1 tablet (12.5 mg total) by mouth daily. 90 tablet 1   • Lisinopril-hydroCHLOROthiazide 10-12.5 MG Oral Tab Take 1 tablet by mouth daily.  90 tablet 1   • Metoprolol [place] distress  SKIN: no rashes,no suspicious lesions  HEENT: atraumatic, normocephalic,ears and throat are clear  NECK: supple  LUNGS: normal respiratory motion without distress  CARDIO: normal peripheral perfusion  GI: no mass, tenderness or organomegaly  GYN: infections in sexually active woman with good hygiene and sometimes prophylactic antibiotics.   I also discussed the treatment of recurrent urinary tract infections in postmenopausal women which fundamental is based with good hygiene and the application of

## 2021-08-03 ENCOUNTER — TELEPHONE (OUTPATIENT)
Dept: SURGERY | Facility: CLINIC | Age: 86
End: 2021-08-03

## 2021-08-03 DIAGNOSIS — N39.41 URGE INCONTINENCE: Primary | ICD-10-CM

## 2021-08-03 NOTE — PROGRESS NOTES
Your recent and culture did not identify a bacterial organism but had many features on urinalysis suggestive of infection including cloudiness and odor. Finish antibiotic as ordered. Recommend follow up in the office as directed.     Sincerely,  Freddy Maldonado

## 2021-08-03 NOTE — TELEPHONE ENCOUNTER
Patient has decided she will try a bedside commode for night time. Order needs to be faxed to [phone] with demographics and insurance information. Ok per Dr Cliff Woody to send order for bedside commode. Patient notified and verbalized understanding.

## 2021-08-04 NOTE — TELEPHONE ENCOUNTER
Per Osmani Espinosa, they have received order and are needing to include Md's NPI, clinical notes with qualifying diagnosis, and pt's height/weight.  Please advise

## 2021-08-04 NOTE — TELEPHONE ENCOUNTER
This RN called [facility] [phone]  in response to his call:     Markell Lovett, they have received order and are needing to include Md's NPI, clinical notes with qualifying diagnosis, and pt's height/weight.  Please advise\"     [employer]

## 2021-08-05 ENCOUNTER — PATIENT MESSAGE (OUTPATIENT)
Dept: NEPHROLOGY | Facility: CLINIC | Age: 86
End: 2021-08-05

## 2021-08-05 NOTE — TELEPHONE ENCOUNTER
From: Judge Castro  To: Moy Cali MD  Sent: 8/5/2021 5:38 PM CDT  Subject: Other    This is German Lin [facility]. My mom bought airline tickets to travel to [address] [place] [address] 8/12/21 - 8/15/21 for my sons wedding.  She feels that she cant make this trip [facility]

## 2021-08-05 NOTE — TELEPHONE ENCOUNTER
Dr. Baptiste Mike, please see below. Pt requesting if you would write a letter saying pt should not fly on an airplane. Ok to issue letter? Thanks!

## 2021-08-05 NOTE — TELEPHONE ENCOUNTER
RN received a call from Kaleb Pa asking for MD's NPI to be included in the order. RN explained that it was written on the cover sheet. She is asking it to be added to the order. RN explained, unable to add NPI to the notes.      Thierno silva

## 2021-08-06 ENCOUNTER — TELEPHONE (OUTPATIENT)
Dept: SURGERY | Facility: CLINIC | Age: 86
End: 2021-08-06

## 2021-08-06 NOTE — TELEPHONE ENCOUNTER
Ann-Marie Powell MD   8/5/2021  2:10 PM CDT Back to Top      Your recent final report of urine culture shows 50-99,000 colonies of Aerococcus which is abnormal.  This would definitely account for cloudy urine with odor.  Unfortunately this bacteria is m

## 2021-08-06 NOTE — TELEPHONE ENCOUNTER
I called the pt and discussed message from RP about urine cuylture results    \"   Ivan Mckee MD   8/5/2021  2:10 PM CDT Back to Top      Your recent final report of urine culture shows 50-99,000 colonies of Aerococcus which is abnormal.  This woul

## 2021-08-07 ENCOUNTER — APPOINTMENT (OUTPATIENT)
Dept: NUCLEAR MEDICINE | Facility: HOSPITAL | Age: 86
DRG: 176 | End: 2021-08-07
Attending: EMERGENCY MEDICINE
Payer: MEDICARE

## 2021-08-07 ENCOUNTER — APPOINTMENT (OUTPATIENT)
Dept: GENERAL RADIOLOGY | Facility: HOSPITAL | Age: 86
DRG: 176 | End: 2021-08-07
Attending: EMERGENCY MEDICINE
Payer: MEDICARE

## 2021-08-07 ENCOUNTER — HOSPITAL ENCOUNTER (INPATIENT)
Facility: HOSPITAL | Age: 86
LOS: 2 days | Discharge: HOME | DRG: 176 | End: 2021-08-09
Attending: EMERGENCY MEDICINE | Admitting: HOSPITALIST
Payer: MEDICARE

## 2021-08-07 ENCOUNTER — APPOINTMENT (OUTPATIENT)
Dept: CT IMAGING | Facility: HOSPITAL | Age: 86
DRG: 176 | End: 2021-08-07
Attending: EMERGENCY MEDICINE
Payer: MEDICARE

## 2021-08-07 DIAGNOSIS — I26.99 ACUTE PULMONARY EMBOLISM, UNSPECIFIED PULMONARY EMBOLISM TYPE, UNSPECIFIED WHETHER ACUTE COR PULMONALE PRESENT (HCC): Primary | ICD-10-CM

## 2021-08-07 DIAGNOSIS — N18.31 STAGE 3A CHRONIC KIDNEY DISEASE (HCC): ICD-10-CM

## 2021-08-07 LAB
ANION GAP SERPL CALC-SCNC: 9 MMOL/L (ref 0–18)
APTT PPP: 31.6 SECONDS (ref 23.2–35.3)
APTT PPP: >240 SECONDS (ref 23.2–35.3)
BASOPHILS # BLD AUTO: 0.03 X10(3) UL (ref 0–0.2)
BASOPHILS NFR BLD AUTO: 0.2 %
BILIRUB UR QL: NEGATIVE
BUN BLD-MCNC: 27 MG/DL (ref 7–18)
BUN/CREAT SERPL: 16.7 (ref 10–20)
CALCIUM BLD-MCNC: 9.2 MG/DL (ref 8.5–10.1)
CHLORIDE SERPL-SCNC: 108 MMOL/L (ref 98–112)
CLARITY UR: CLEAR
CO2 SERPL-SCNC: 24 MMOL/L (ref 21–32)
COLOR UR: YELLOW
CREAT BLD-MCNC: 1.62 MG/DL
D DIMER PPP FEU-MCNC: 1.53 UG/ML FEU (ref ?–0.88)
DEPRECATED RDW RBC AUTO: 43.8 FL (ref 35.1–46.3)
EOSINOPHIL # BLD AUTO: 0.02 X10(3) UL (ref 0–0.7)
EOSINOPHIL NFR BLD AUTO: 0.2 %
ERYTHROCYTE [DISTWIDTH] IN BLOOD BY AUTOMATED COUNT: 13.1 % (ref 11–15)
GLUCOSE BLD-MCNC: 133 MG/DL (ref 70–99)
GLUCOSE UR-MCNC: NEGATIVE MG/DL
HAV IGM SER QL: 2.3 MG/DL (ref 1.6–2.6)
HCT VFR BLD AUTO: 37.6 %
HGB BLD-MCNC: 12.2 G/DL
IMM GRANULOCYTES # BLD AUTO: 0.05 X10(3) UL (ref 0–1)
IMM GRANULOCYTES NFR BLD: 0.4 %
KETONES UR-MCNC: NEGATIVE MG/DL
LYMPHOCYTES # BLD AUTO: 0.71 X10(3) UL (ref 1–4)
LYMPHOCYTES NFR BLD AUTO: 5.8 %
MCH RBC QN AUTO: 29.7 PG (ref 26–34)
MCHC RBC AUTO-ENTMCNC: 32.4 G/DL (ref 31–37)
MCV RBC AUTO: 91.5 FL
MONOCYTES # BLD AUTO: 0.45 X10(3) UL (ref 0.1–1)
MONOCYTES NFR BLD AUTO: 3.7 %
NEUTROPHILS # BLD AUTO: 10.96 X10 (3) UL (ref 1.5–7.7)
NEUTROPHILS # BLD AUTO: 10.96 X10(3) UL (ref 1.5–7.7)
NEUTROPHILS NFR BLD AUTO: 89.7 %
NITRITE UR QL STRIP.AUTO: NEGATIVE
NT-PROBNP SERPL-MCNC: 571 PG/ML (ref ?–450)
OSMOLALITY SERPL CALC.SUM OF ELEC: 299 MOSM/KG (ref 275–295)
PH UR: 6 [PH] (ref 5–8)
PLATELET # BLD AUTO: 205 10(3)UL (ref 150–450)
POTASSIUM SERPL-SCNC: 4.1 MMOL/L (ref 3.5–5.1)
PROT UR-MCNC: NEGATIVE MG/DL
RBC # BLD AUTO: 4.11 X10(6)UL
SARS-COV-2 RNA RESP QL NAA+PROBE: NOT DETECTED
SODIUM SERPL-SCNC: 141 MMOL/L (ref 136–145)
SP GR UR STRIP: 1.01 (ref 1–1.03)
UROBILINOGEN UR STRIP-ACNC: <2
WBC # BLD AUTO: 12.2 X10(3) UL (ref 4–11)

## 2021-08-07 PROCEDURE — 99223 1ST HOSP IP/OBS HIGH 75: CPT | Performed by: INTERNAL MEDICINE

## 2021-08-07 PROCEDURE — 71045 X-RAY EXAM CHEST 1 VIEW: CPT | Performed by: EMERGENCY MEDICINE

## 2021-08-07 PROCEDURE — 99222 1ST HOSP IP/OBS MODERATE 55: CPT | Performed by: HOSPITALIST

## 2021-08-07 PROCEDURE — 78582 LUNG VENTILAT&PERFUS IMAGING: CPT | Performed by: EMERGENCY MEDICINE

## 2021-08-07 RX ORDER — VANCOMYCIN HYDROCHLORIDE 125 MG/1
125 CAPSULE ORAL DAILY
Status: DISCONTINUED | OUTPATIENT
Start: 2021-08-07 | End: 2021-08-09

## 2021-08-07 RX ORDER — HEPARIN SODIUM AND DEXTROSE 10000; 5 [USP'U]/100ML; G/100ML
18 INJECTION INTRAVENOUS ONCE
Status: COMPLETED | OUTPATIENT
Start: 2021-08-07 | End: 2021-08-07

## 2021-08-07 RX ORDER — HEPARIN SODIUM 1000 [USP'U]/ML
80 INJECTION, SOLUTION INTRAVENOUS; SUBCUTANEOUS ONCE
Status: COMPLETED | OUTPATIENT
Start: 2021-08-07 | End: 2021-08-07

## 2021-08-07 RX ORDER — METOPROLOL SUCCINATE 50 MG/1
50 TABLET, EXTENDED RELEASE ORAL DAILY
Status: DISCONTINUED | OUTPATIENT
Start: 2021-08-07 | End: 2021-08-09

## 2021-08-07 RX ORDER — PANTOPRAZOLE SODIUM 40 MG/1
40 TABLET, DELAYED RELEASE ORAL
Status: DISCONTINUED | OUTPATIENT
Start: 2021-08-08 | End: 2021-08-09

## 2021-08-07 RX ORDER — NITROFURANTOIN 25; 75 MG/1; MG/1
100 CAPSULE ORAL 2 TIMES DAILY
Status: DISCONTINUED | OUTPATIENT
Start: 2021-08-07 | End: 2021-08-07

## 2021-08-07 RX ORDER — DILTIAZEM HYDROCHLORIDE 120 MG/1
240 CAPSULE, EXTENDED RELEASE ORAL DAILY
Status: DISCONTINUED | OUTPATIENT
Start: 2021-08-07 | End: 2021-08-09

## 2021-08-07 RX ORDER — HEPARIN SODIUM AND DEXTROSE 10000; 5 [USP'U]/100ML; G/100ML
INJECTION INTRAVENOUS CONTINUOUS
Status: DISCONTINUED | OUTPATIENT
Start: 2021-08-07 | End: 2021-08-09

## 2021-08-07 RX ORDER — HYDROCHLOROTHIAZIDE 25 MG/1
12.5 TABLET ORAL DAILY
Status: DISCONTINUED | OUTPATIENT
Start: 2021-08-07 | End: 2021-08-09

## 2021-08-07 RX ORDER — ACETAMINOPHEN 325 MG/1
650 TABLET ORAL EVERY 6 HOURS PRN
Status: DISCONTINUED | OUTPATIENT
Start: 2021-08-07 | End: 2021-08-09

## 2021-08-07 NOTE — SPIRITUAL CARE NOTE
Pt was in good spirit, daughter Soindo Pittman at bedside. She said she was not feeling well, decided to come in to check it out. She felt better now. She is independent, very close to her children and grandchildren, enjoys her life very much.  She goes to [address] [place] [address]

## 2021-08-07 NOTE — ED INITIAL ASSESSMENT (HPI)
Pt presents with a dry cough and general body weakness, pt currently on an antibiotic for a uti and has hx of pneumonia in may. Pt denies chest pain and reports shortness of breath upon exertion. Pt reports a low grade fever this morning.

## 2021-08-07 NOTE — ED QUICK NOTES
Orders for admission, patient is aware of plan and ready to go upstairs. Any questions, please call ED NATASHA Verma at [phone].    Type of COVID test sent:Vaccinated  COVID Suspicion level: Low    Titratable drug(s) infusing:  Rate: Heparin    LOC at

## 2021-08-07 NOTE — ED PROVIDER NOTES
Patient Seen in: [facility] AND [facility] Emergency Department    History   Patient presents with:  Cough  Weakness    Stated Complaint: Cough, Fever, weakness    HPI    Patient here with cough, congestion for 2 days. No travel, no known sick contacts.   Grant Take 500 mg by mouth as needed.          Family History   Problem Relation Age of Onset   • Heart Disorder Father 52        heart attack cause of death   • Musculo-skelatal Disorder Mother         osteoporosis       Social History    Tobacco Use      [place]okin Creatinine 1.62 (*)     Calculated Osmolality 299 (*)     GFR, Non- 28 (*)     GFR, -American 32 (*)     All other components within normal limits   D-DIMER - Abnormal; Notable for the following components:    D-Dimer 1.53 (*)     Al exam.  High probability for pulmonary embolism. Findings relayed to Dr. Douglas Bermeo at 2:41 p.m. on 8/7/2021.    Dictated by (CST): Rena Walton MD on 8/07/2021 at 2:27 PM     Finalized by (CST): Rena Walton MD on 8/07/2021 at 2:42 PM          I spent a

## 2021-08-07 NOTE — CONSULTS
[facility]    Report of Consultation    Rosalind Combs Patient Status:  Inpatient    1933 MRN [id number]   Location [facility] 4W/SW/SE Attending Claude Wellington MD   Hosp Day # 0 PCP Leena Watson MD     Date of Admi Rarely wine 1 glass    Drug use: Never    Allergies/Medications:    Allergies:   Atorvastatin            OTHER (SEE COMMENTS)    Comment:Double Vision  Statins                   nitrofurantoin monohydrate macro 100 MG Oral Cap, Take 1 capsule (100 mg total) Normocephalic and atraumatic. Eyes: No scleral icterus. Neck: Neck supple. No JVD present. Cardiovascular: Normal rate. Exam reveals no gallop. No murmur heard. Pulmonary/Chest: No stridor.    Good air exchange to both lungs  Mild basilar crackle at 2:27 PM     Finalized by (CST): Omar Jara MD on 8/07/2021 at 2:42 PM          EKG 12 Lead    Result Date: 8/7/2021  ECG Report  Interpretation  -------------------------- Sinus Rhythm Voltage criteria for LVH met - Nonspecific T-abnormality.  Gaby Choi

## 2021-08-07 NOTE — H&P
[address] [facility] Patient Status:  Inpatient    1933 MRN [id number]   Location [facility] 4W/SW/SE Attending Cleopatra Rogers., MD   Hosp Day # 0 PCP Eddie Case MD     Date:  2021 Cap, Take 1 capsule (100 mg total) by mouth 2 (two) times daily.   Pantoprazole Sodium 40 MG Oral Tab EC, Take 1 tablet (40 mg total) by mouth every morning before breakfast.  hydrochlorothiazide 12.5 MG Oral Tab, Take 1 tablet (12.5 mg total) by mouth scott oriented, normal affect. Skin: Warm and dry.      Results:     Lab Results   Component Value Date    WBC 12.2 (H) 08/07/2021    HGB 12.2 08/07/2021    HCT 37.6 08/07/2021    .0 08/07/2021    CREATSERUM 1.62 (H) 08/07/2021    BUN 27 (H) 08/07/2021 high probability for PE  2d echo ordered, LE dopplers ordered  Started on heparin gtt  Hemodynamically stable  Pulmonology consulted     UTI  With hx of recurrent UTIs  OP on bactrim, will start on rocephin     BRYNN  Likely due to dehydration  Encourage PO

## 2021-08-08 ENCOUNTER — APPOINTMENT (OUTPATIENT)
Dept: ULTRASOUND IMAGING | Facility: HOSPITAL | Age: 86
DRG: 176 | End: 2021-08-08
Attending: INTERNAL MEDICINE
Payer: MEDICARE

## 2021-08-08 LAB
ALBUMIN SERPL-MCNC: 2.7 G/DL (ref 3.4–5)
ANION GAP SERPL CALC-SCNC: 5 MMOL/L (ref 0–18)
APTT PPP: 111.2 SECONDS (ref 23.2–35.3)
APTT PPP: 206.2 SECONDS (ref 23.2–35.3)
APTT PPP: >240 SECONDS (ref 23.2–35.3)
BASOPHILS # BLD AUTO: 0.05 X10(3) UL (ref 0–0.2)
BASOPHILS NFR BLD AUTO: 0.6 %
BUN BLD-MCNC: 22 MG/DL (ref 7–18)
BUN/CREAT SERPL: 18.2 (ref 10–20)
CALCIUM BLD-MCNC: 8.6 MG/DL (ref 8.5–10.1)
CHLORIDE SERPL-SCNC: 110 MMOL/L (ref 98–112)
CO2 SERPL-SCNC: 27 MMOL/L (ref 21–32)
CREAT BLD-MCNC: 1.21 MG/DL
DEPRECATED RDW RBC AUTO: 44.4 FL (ref 35.1–46.3)
EOSINOPHIL # BLD AUTO: 0.44 X10(3) UL (ref 0–0.7)
EOSINOPHIL NFR BLD AUTO: 5.3 %
ERYTHROCYTE [DISTWIDTH] IN BLOOD BY AUTOMATED COUNT: 13.2 % (ref 11–15)
GLUCOSE BLD-MCNC: 103 MG/DL (ref 70–99)
HCT VFR BLD AUTO: 33 %
HGB BLD-MCNC: 10.8 G/DL
IMM GRANULOCYTES # BLD AUTO: 0.02 X10(3) UL (ref 0–1)
IMM GRANULOCYTES NFR BLD: 0.2 %
LYMPHOCYTES # BLD AUTO: 2.06 X10(3) UL (ref 1–4)
LYMPHOCYTES NFR BLD AUTO: 24.6 %
MCH RBC QN AUTO: 30 PG (ref 26–34)
MCHC RBC AUTO-ENTMCNC: 32.7 G/DL (ref 31–37)
MCV RBC AUTO: 91.7 FL
MONOCYTES # BLD AUTO: 0.63 X10(3) UL (ref 0.1–1)
MONOCYTES NFR BLD AUTO: 7.5 %
NEUTROPHILS # BLD AUTO: 5.18 X10 (3) UL (ref 1.5–7.7)
NEUTROPHILS # BLD AUTO: 5.18 X10(3) UL (ref 1.5–7.7)
NEUTROPHILS NFR BLD AUTO: 61.8 %
OSMOLALITY SERPL CALC.SUM OF ELEC: 298 MOSM/KG (ref 275–295)
PHOSPHATE SERPL-MCNC: 3 MG/DL (ref 2.5–4.9)
PLATELET # BLD AUTO: 157 10(3)UL (ref 150–450)
POTASSIUM SERPL-SCNC: 3.8 MMOL/L (ref 3.5–5.1)
RBC # BLD AUTO: 3.6 X10(6)UL
SODIUM SERPL-SCNC: 142 MMOL/L (ref 136–145)
WBC # BLD AUTO: 8.4 X10(3) UL (ref 4–11)

## 2021-08-08 PROCEDURE — 99233 SBSQ HOSP IP/OBS HIGH 50: CPT | Performed by: HOSPITALIST

## 2021-08-08 PROCEDURE — 93970 EXTREMITY STUDY: CPT | Performed by: INTERNAL MEDICINE

## 2021-08-08 PROCEDURE — 99233 SBSQ HOSP IP/OBS HIGH 50: CPT | Performed by: INTERNAL MEDICINE

## 2021-08-08 NOTE — PROGRESS NOTES
[facility]    Progress Note    Sg Staples Patient Status:  Inpatient    1933 MRN [id number]   Location [facility] 4W/SW/SE Attending Ladi Nance MD   Hosp Day # 1 PCP Favian Burns MD     CC: SOB  Subjective:   P • vancomycin  125 mg Oral Daily   • cefTRIAXone  1 g Intravenous Q24H       Current PRN Inpatient Meds:      acetaminophen    Results:     Recent Labs   Lab 08/07/21  1143 08/08/21  0459   RBC 4.11 3.60*   HGB 12.2 10.8*   HCT 37.6 33.0*   MCV 91.5 91.7 11/22/2020 11:36:53 No significant change Electronically signed on 08/07/2021 at 16:33 by Marciano Chavez MD    Assessment and Plan:     Acute pulmonary embolism  - D-dimer elevated  - CT PE not done due to BRYNN  - VQ showing high probability for PE  - 2d

## 2021-08-08 NOTE — PROGRESS NOTES
[facility]    Progress Note    San Pierrerob Grover Patient Status:  Inpatient    1933 MRN [id number]   Location [facility] 4W/SW/SE Attending Jarrell Peabody, MD   Hosp Day # 1 PCP Garo Mc MD     HPI/Subjective:     Cherylene [place] 7.8 05/06/2021    AST 98 (H) 05/06/2021    ALT 86 (H) 05/06/2021    .2 (H) 08/08/2021    TSH 2.60 09/22/2017     05/06/2021    DDIMER 1.53 (H) 08/07/2021    MG 2.3 08/07/2021    PHOS 3.0 08/08/2021    TROP <0.045 11/22/2020       US VENOUS DO MD  8/8/2021

## 2021-08-08 NOTE — PLAN OF CARE
Pt a/o x 4. On room air and remote tele. Purewick in place. Pt incontinent at times. Low sodium 1.5 diet - tolerated. Heparin drip infusing at 14.5ml/hr. PTT redraw at 5am - 8/8  Will continue to monitor. BLP, call light in reach.      Problem: Grant Notify MD/LIP if interventions unsuccessful or patient reports new pain  - Anticipate increased pain with activity and pre-medicate as appropriate  Outcome: Progressing     Problem: RISK FOR INFECTION - ADULT  Goal: Absence of fever/infection during antici Problem: RESPIRATORY - ADULT  Goal: Achieves optimal ventilation and oxygenation  Description: INTERVENTIONS:  - Assess for changes in respiratory status  - Assess for changes in mentation and behavior  - Position to facilitate oxygenation and minimize r

## 2021-08-09 ENCOUNTER — APPOINTMENT (OUTPATIENT)
Dept: CV DIAGNOSTICS | Facility: HOSPITAL | Age: 86
DRG: 176 | End: 2021-08-09
Attending: INTERNAL MEDICINE
Payer: MEDICARE

## 2021-08-09 VITALS
RESPIRATION RATE: 18 BRPM | DIASTOLIC BLOOD PRESSURE: 64 MMHG | BODY MASS INDEX: 36.17 KG/M2 | HEIGHT: 64 IN | WEIGHT: 211.88 LBS | TEMPERATURE: 98 F | SYSTOLIC BLOOD PRESSURE: 139 MMHG | HEART RATE: 61 BPM | OXYGEN SATURATION: 98 %

## 2021-08-09 LAB
ANION GAP SERPL CALC-SCNC: 5 MMOL/L (ref 0–18)
APTT PPP: 122.5 SECONDS (ref 23.2–35.3)
APTT PPP: 56 SECONDS (ref 23.2–35.3)
BUN BLD-MCNC: 23 MG/DL (ref 7–18)
BUN/CREAT SERPL: 19 (ref 10–20)
CALCIUM BLD-MCNC: 9.2 MG/DL (ref 8.5–10.1)
CHLORIDE SERPL-SCNC: 108 MMOL/L (ref 98–112)
CO2 SERPL-SCNC: 27 MMOL/L (ref 21–32)
CREAT BLD-MCNC: 1.21 MG/DL
DEPRECATED RDW RBC AUTO: 45.7 FL (ref 35.1–46.3)
ERYTHROCYTE [DISTWIDTH] IN BLOOD BY AUTOMATED COUNT: 13.2 % (ref 11–15)
GLUCOSE BLD-MCNC: 99 MG/DL (ref 70–99)
HCT VFR BLD AUTO: 36.9 %
HGB BLD-MCNC: 11.7 G/DL
MCH RBC QN AUTO: 29.8 PG (ref 26–34)
MCHC RBC AUTO-ENTMCNC: 31.7 G/DL (ref 31–37)
MCV RBC AUTO: 94.1 FL
OSMOLALITY SERPL CALC.SUM OF ELEC: 294 MOSM/KG (ref 275–295)
PLATELET # BLD AUTO: 186 10(3)UL (ref 150–450)
POTASSIUM SERPL-SCNC: 4 MMOL/L (ref 3.5–5.1)
RBC # BLD AUTO: 3.92 X10(6)UL
SODIUM SERPL-SCNC: 140 MMOL/L (ref 136–145)
WBC # BLD AUTO: 4.7 X10(3) UL (ref 4–11)

## 2021-08-09 PROCEDURE — 93306 TTE W/DOPPLER COMPLETE: CPT | Performed by: INTERNAL MEDICINE

## 2021-08-09 PROCEDURE — 99239 HOSP IP/OBS DSCHRG MGMT >30: CPT | Performed by: HOSPITALIST

## 2021-08-09 PROCEDURE — 99233 SBSQ HOSP IP/OBS HIGH 50: CPT | Performed by: INTERNAL MEDICINE

## 2021-08-09 NOTE — TELEPHONE ENCOUNTER
This RN called back Keyona from [facility] in regards to her call:     \"Per Keyona calling to confirm order request fax was received. Per Kim Zamorano they need Dr. Seven Livingston NPI number added to the order. Please advise. \"    Spoke to Huma Ahuja.  RN explaine

## 2021-08-09 NOTE — TELEPHONE ENCOUNTER
Per Madeline Riley calling to confirm order request fax was received. Per Madeline Riley they need Dr. Baldo Lucia NPI number added to the order.  Please advise

## 2021-08-09 NOTE — PLAN OF CARE
Pt a/o x 4. On room air and remote tele. Purewick in place. Pt incontinent at times. Low sodium 1.5 diet - tolerated. Heparin drip infusing at 8ml/hr. PTT redraw at 2am - 8/9. Fall precaution in place. Will continue to monitor.    BLP, call light in opioid side effects  - Notify MD/LIP if interventions unsuccessful or patient reports new pain  - Anticipate increased pain with activity and pre-medicate as appropriate  Outcome: Progressing     Problem: RISK FOR INFECTION - ADULT  Goal: Absence of fever/ system  Outcome: Progressing     Problem: RESPIRATORY - ADULT  Goal: Achieves optimal ventilation and oxygenation  Description: INTERVENTIONS:  - Assess for changes in respiratory status  - Assess for changes in mentation and behavior  - Position to facili

## 2021-08-09 NOTE — DISCHARGE SUMMARY
[facility]    Discharge Summary    Rolo Ervin Patient Status:  Inpatient    1933 MRN [id number]   Location [facility] 4W/SW/SE Attending Ramos Walsh MD   Hosp Day # 2 PCP Baldo Urena MD     Date of Admissi present [facility])      Reason for Admission: SOB, COUGH    Physical Exam:    08/09/21  1212   BP: 139/64   Pulse: 61   Resp: 18   Temp: 98 °F (36.7 °C)   General: No acute distress.    Skin: Warm hydrated  Psych: Calm and cooperative  Respiratory: Clear to ausc instructions:  Days 1-21: 15 mg by mouth twice daily  Days 22-30: 20 mg by mouth once daily   Quantity: 1 each  Refills: 0        CONTINUE taking these medications      Instructions Prescription details   dilTIAZem 240 MG Cp24  Commonly known as: cardIZEM

## 2021-08-09 NOTE — PLAN OF CARE
Patient alert and oriented, vital signs stable on room air, denies pain/nausea, Heparin gtt discontinued, transitioned to Xarelto to be taken at discharge. Patient cleared for discharge.  PIV removed, AVS (medication instructions and follow up appointments) and social influences on pain and pain management  - Manage/alleviate anxiety  - Utilize distraction and/or relaxation techniques  - Monitor for opioid side effects  - Notify MD/LIP if interventions unsuccessful or patient reports new pain  - Anticipate in discharge planning if the patient needs post-hospital services based on physician/LIP order or complex needs related to functional status, cognitive ability or social support system  Outcome: Adequate for Discharge     Problem: RESPIRATORY - ADULT  Goal: A

## 2021-08-09 NOTE — CM/SW NOTE
MDO advanced directives    SW met with patient at bedside, introduced self and role. Patient lives at home alone (address correct on face sheet, no stairs). Patient independent with ADL's, drives. Patient uses a cane to ambulate as needed.  Patient does not

## 2021-08-09 NOTE — PROGRESS NOTES
Pulmonary/Critical Care Follow Up Note    HPI:   Ousmane Lincoln is a 80year old female with Patient presents with:  Cough  Weakness      PCP Jeff Jarrett MD  Admission Attending Gaby Brambila.  Eliezer Course, [facility] Day #2    No CP  No sob  C/o wheeze  N 08/09/2021    HCT 36.9 08/09/2021    .0 08/09/2021    CREATSERUM 1.21 08/09/2021    BUN 23 08/09/2021     08/09/2021    K 4.0 08/09/2021     08/09/2021    CO2 27.0 08/09/2021    GLU 99 08/09/2021    CA 9.2 08/09/2021    PTT 56.0 08/09/20

## 2021-08-10 ENCOUNTER — TELEPHONE (OUTPATIENT)
Dept: NEPHROLOGY | Facility: CLINIC | Age: 86
End: 2021-08-10

## 2021-08-10 ENCOUNTER — PATIENT OUTREACH (OUTPATIENT)
Dept: CASE MANAGEMENT | Age: 86
End: 2021-08-10

## 2021-08-10 DIAGNOSIS — R06.2 WHEEZE: ICD-10-CM

## 2021-08-10 DIAGNOSIS — R50.81 FEVER IN OTHER DISEASES: ICD-10-CM

## 2021-08-10 DIAGNOSIS — Z02.9 ENCOUNTERS FOR UNSPECIFIED ADMINISTRATIVE PURPOSE: ICD-10-CM

## 2021-08-10 DIAGNOSIS — I26.99 ACUTE PULMONARY EMBOLISM, UNSPECIFIED PULMONARY EMBOLISM TYPE, UNSPECIFIED WHETHER ACUTE COR PULMONALE PRESENT (HCC): ICD-10-CM

## 2021-08-10 PROCEDURE — 1111F DSCHRG MED/CURRENT MED MERGE: CPT

## 2021-08-10 NOTE — TELEPHONE ENCOUNTER
Pt called in stating she was recently discharged from the ER and has to have a appt within 1 week. I checked the schedule it is currently booked.  Please call

## 2021-08-10 NOTE — TELEPHONE ENCOUNTER
Hi Dr. Silvio Tan,     Patient was recently discharged from the hospital after a pulmonary embolism. She is looking for a follow up appointment for later this week early next. You are all booked. Is there any time you would be ok putting this patient in.

## 2021-08-10 NOTE — PROGRESS NOTES
NCM placed call to patient for TCM, LM requesting a call to [phone] back with a condition update.

## 2021-08-11 ENCOUNTER — TELEPHONE (OUTPATIENT)
Dept: NEPHROLOGY | Facility: CLINIC | Age: 86
End: 2021-08-11

## 2021-08-11 NOTE — TELEPHONE ENCOUNTER
Dr. Batsheva Galvan, is it ok to book apt for either mon or tues next week in MD approval for hospital follow up? Thank you!

## 2021-08-11 NOTE — PROGRESS NOTES
Initial Post Discharge Follow Up   Discharge Date: 8/9/21  Contact Date: 8/10/2021    Consent Verification:  Assessment Completed With: Patient  HIPAA Verified?   Yes    Discharge Dx:     Acute pulmonary embolism, unspecified pulmonary embolism type, uns well   o Very well  • Do you have any questions about your discharge instructions? No  • Before leaving the hospital was your diagnoses explained to you? Yes  • Do you have any questions about your diagnoses?  No  • Are you able to perform normal daily act any reasons that keep you from taking your medication as prescribed? No  Are you having any concerns with constipation? No    Referrals/orders at D/C:  Home Health/Services ordered at D/C? No    DME ordered at D/C? No, patient states she has a cane at home located [facility] to the [facility]. Also, free [employer] service is available. Our office is then located on the fourth floor, [address].     Make sure that you have your pre-op/follow-up appointment scheduled with your doctor after your test is patient to call PCP with any questions or needs, she states she will.     CCM referral placed:  Yes    BOOK BY DATE: 8/23/2021    [x]  Discharge Summary, Discharge medications reviewed/discussed/and reconciled against outpatient medications with patient,  a

## 2021-08-11 NOTE — TELEPHONE ENCOUNTER
This RN refaxed the order form to [facility] at [phone] and [phone] today 8/11/21.

## 2021-08-11 NOTE — TELEPHONE ENCOUNTER
Spoke to pt for TCM today. Pt does not have HFU appt scheduled at this time. NCM attempted to schedule a TCM HFU, patient states she has already called to schedule the appointment and is waiting for a call back from Dr. Chris Phelps office.   TCM/HFU appt rec

## 2021-08-13 ENCOUNTER — LAB ENCOUNTER (OUTPATIENT)
Dept: LAB | Age: 86
End: 2021-08-13
Attending: HOSPITALIST
Payer: MEDICARE

## 2021-08-13 DIAGNOSIS — N18.31 STAGE 3A CHRONIC KIDNEY DISEASE (HCC): ICD-10-CM

## 2021-08-13 LAB
ALBUMIN SERPL-MCNC: 3.3 G/DL (ref 3.4–5)
ANION GAP SERPL CALC-SCNC: 5 MMOL/L (ref 0–18)
BUN BLD-MCNC: 24 MG/DL (ref 7–18)
BUN/CREAT SERPL: 20.9 (ref 10–20)
CALCIUM BLD-MCNC: 9.8 MG/DL (ref 8.5–10.1)
CHLORIDE SERPL-SCNC: 108 MMOL/L (ref 98–112)
CO2 SERPL-SCNC: 26 MMOL/L (ref 21–32)
CREAT BLD-MCNC: 1.15 MG/DL
GLUCOSE BLD-MCNC: 118 MG/DL (ref 70–99)
OSMOLALITY SERPL CALC.SUM OF ELEC: 293 MOSM/KG (ref 275–295)
PHOSPHATE SERPL-MCNC: 2.7 MG/DL (ref 2.5–4.9)
POTASSIUM SERPL-SCNC: 3.9 MMOL/L (ref 3.5–5.1)
SODIUM SERPL-SCNC: 139 MMOL/L (ref 136–145)

## 2021-08-13 PROCEDURE — 36415 COLL VENOUS BLD VENIPUNCTURE: CPT

## 2021-08-13 PROCEDURE — 80069 RENAL FUNCTION PANEL: CPT

## 2021-08-16 ENCOUNTER — OFFICE VISIT (OUTPATIENT)
Dept: NEPHROLOGY | Facility: CLINIC | Age: 86
End: 2021-08-16
Payer: MEDICARE

## 2021-08-16 VITALS
DIASTOLIC BLOOD PRESSURE: 63 MMHG | BODY MASS INDEX: 36.37 KG/M2 | TEMPERATURE: 98 F | SYSTOLIC BLOOD PRESSURE: 139 MMHG | HEART RATE: 56 BPM | HEIGHT: 64 IN | WEIGHT: 213 LBS

## 2021-08-16 DIAGNOSIS — I26.99 OTHER ACUTE PULMONARY EMBOLISM WITHOUT ACUTE COR PULMONALE (HCC): Primary | ICD-10-CM

## 2021-08-16 DIAGNOSIS — N18.31 STAGE 3A CHRONIC KIDNEY DISEASE (HCC): ICD-10-CM

## 2021-08-16 DIAGNOSIS — R19.7 DIARRHEA, UNSPECIFIED TYPE: ICD-10-CM

## 2021-08-16 PROCEDURE — 99214 OFFICE O/P EST MOD 30 MIN: CPT | Performed by: INTERNAL MEDICINE

## 2021-08-16 NOTE — PROGRESS NOTES
[facility]  Nephrology Daily Progress Note    Félix Zaidi  [id number]  80year old  Patient presents with: Follow - Up: Follow up patient was in Hospital from August 7 to 9      HPI:   Félix Zaidi is a 80year old female.   69-year-old femal rash  Musculoskeletal:  Negative for bone/joint symptoms  Neurological:  Negative for gait disturbance  Psychiatric:  Negative for inappropriate interaction and psychiatric symptoms  Respiratory:  Negative for cough, dyspnea and wheezing      PHYSICAL EXAM Labs     08/13/21  1137   *      K 3.9      CO2 26.0   BUN 24*   CREATSERUM 1.15*   CA 9.8   ALB 3.3*   PHOS 2.7   BUNCREA 20.9*   ANIONGAP 5   OSMOCALC 293   GFRNAA 43*   GFRAA 49*       Imaging  No results found.     Medications:    Cur (supraventricular tachycardia) (HCC)     Bilateral carotid artery stenosis     CKD (chronic kidney disease) stage 3, GFR 30-59 ml/min (HCC)     PVCs (premature ventricular contractions)     Sensorineural hearing loss, bilateral     Chronic nonalcoholic yvon

## 2021-08-16 NOTE — PATIENT INSTRUCTIONS
Remember we need to continue the blood thinning medications for full 4 months. Do the stool culture is ordered. Check blood pressures daily and call me in 1 week.

## 2021-08-23 ENCOUNTER — LAB ENCOUNTER (OUTPATIENT)
Dept: LAB | Age: 86
End: 2021-08-23
Attending: INTERNAL MEDICINE
Payer: MEDICARE

## 2021-08-23 DIAGNOSIS — R19.7 DIARRHEA, UNSPECIFIED TYPE: ICD-10-CM

## 2021-08-23 PROCEDURE — 87493 C DIFF AMPLIFIED PROBE: CPT

## 2021-08-24 ENCOUNTER — TELEPHONE (OUTPATIENT)
Dept: NEPHROLOGY | Facility: CLINIC | Age: 86
End: 2021-08-24

## 2021-08-24 LAB — C DIFF TOX B STL QL: POSITIVE

## 2021-08-24 RX ORDER — METRONIDAZOLE 500 MG/1
500 TABLET ORAL 3 TIMES DAILY
Qty: 30 TABLET | Refills: 0 | Status: SHIPPED | OUTPATIENT
Start: 2021-08-24 | End: 2021-09-03

## 2021-08-24 NOTE — TELEPHONE ENCOUNTER
Spoke with patient, discussed below message. Pt verbalizes understanding. Rx sent. Instructed pt to perform vigilant hand hygiene and to clean down surfaces in the home.

## 2021-08-24 NOTE — TELEPHONE ENCOUNTER
Sara Harp from [facility] is calling with abnormal lab results.  Please call

## 2021-08-26 ENCOUNTER — TELEPHONE (OUTPATIENT)
Dept: NEPHROLOGY | Facility: CLINIC | Age: 86
End: 2021-08-26

## 2021-08-26 NOTE — TELEPHONE ENCOUNTER
Spoke with patient- pt currently on xarelto 15mg BID. Pt on day 18 of pack, was instructed to check with her doctor before day 22 to see if she can increase to 20mg daily as directed?     Pt requesting if she will need to be on this medication longer and if

## 2021-08-27 ENCOUNTER — NURSE ONLY (OUTPATIENT)
Dept: SURGERY | Facility: CLINIC | Age: 86
End: 2021-08-27
Payer: MEDICARE

## 2021-08-27 ENCOUNTER — TELEPHONE (OUTPATIENT)
Dept: SURGERY | Facility: CLINIC | Age: 86
End: 2021-08-27

## 2021-08-27 DIAGNOSIS — N39.0 RECURRENT UTI: Primary | ICD-10-CM

## 2021-08-27 LAB
BILIRUB UR QL: NEGATIVE
CLARITY UR: CLEAR
COLOR UR: YELLOW
GLUCOSE UR-MCNC: NEGATIVE MG/DL
HGB UR QL STRIP.AUTO: NEGATIVE
KETONES UR-MCNC: NEGATIVE MG/DL
NITRITE UR QL STRIP.AUTO: NEGATIVE
PH UR: 6 [PH] (ref 5–8)
PROT UR-MCNC: NEGATIVE MG/DL
SP GR UR STRIP: 1.01 (ref 1–1.03)
UROBILINOGEN UR STRIP-ACNC: <2

## 2021-08-27 NOTE — TELEPHONE ENCOUNTER
Spoke with patient, discussed below message. Pt verbalizes understanding. 90 day rx sent to the pharmacy. Instructed pt to call us back if any questions.

## 2021-08-30 ENCOUNTER — TELEPHONE (OUTPATIENT)
Dept: SURGERY | Facility: CLINIC | Age: 86
End: 2021-08-30

## 2021-08-30 NOTE — PROGRESS NOTES
Your recent UA is normal.  Recommend follow up in the office as directed.     Sincerely,  Deric To MD

## 2021-08-30 NOTE — TELEPHONE ENCOUNTER
Patient notified urine culture is negative and to keep next appt for possible cystoscopy. Verbalized understanding.

## 2021-09-05 ENCOUNTER — PATIENT MESSAGE (OUTPATIENT)
Dept: NEPHROLOGY | Facility: CLINIC | Age: 86
End: 2021-09-05

## 2021-09-07 ENCOUNTER — TELEPHONE (OUTPATIENT)
Dept: NEPHROLOGY | Facility: CLINIC | Age: 86
End: 2021-09-07

## 2021-09-07 DIAGNOSIS — A09 DIARRHEA OF INFECTIOUS ORIGIN: ICD-10-CM

## 2021-09-07 DIAGNOSIS — R19.7 LIQUID STOOL: Primary | ICD-10-CM

## 2021-09-07 NOTE — TELEPHONE ENCOUNTER
Daughter Francesbetina Rosasjae called in stating that the pt is suppose to be seen for an appt within this month. The schedule is currently booked.  Please call

## 2021-09-07 NOTE — TELEPHONE ENCOUNTER
If her symptoms are better can give it another week but if symptoms persist repeat stool for C. difficile.

## 2021-09-07 NOTE — TELEPHONE ENCOUNTER
Patient's daughter has sent a My Chart message to Dr. Juliette Garza regarding C-Diff. Waiting for Dr. Cooper El reply. No appointment booked as of now.

## 2021-09-09 NOTE — TELEPHONE ENCOUNTER
See My Chart message with response from Dr. Kisha Morgan. Spoke to patient. Her stools are becoming more solid (mushy with solid balls of stool).  Reviewed Dr. Nathaniel Bowers My Chart message and advised patient to continue to wait a week to see if symptoms continue

## 2021-09-14 ENCOUNTER — PROCEDURE (OUTPATIENT)
Dept: SURGERY | Facility: CLINIC | Age: 86
End: 2021-09-14
Payer: MEDICARE

## 2021-09-14 ENCOUNTER — LAB ENCOUNTER (OUTPATIENT)
Dept: LAB | Age: 86
End: 2021-09-14
Attending: INTERNAL MEDICINE
Payer: MEDICARE

## 2021-09-14 DIAGNOSIS — N39.0 RECURRENT UTI: ICD-10-CM

## 2021-09-14 DIAGNOSIS — R82.90 URINE FINDING: Primary | ICD-10-CM

## 2021-09-14 DIAGNOSIS — N39.44 URINARY INCONTINENCE, NOCTURNAL ENURESIS: ICD-10-CM

## 2021-09-14 DIAGNOSIS — A09 DIARRHEA OF INFECTIOUS ORIGIN: ICD-10-CM

## 2021-09-14 DIAGNOSIS — R35.0 FREQUENCY OF MICTURITION: ICD-10-CM

## 2021-09-14 DIAGNOSIS — R19.7 LIQUID STOOL: ICD-10-CM

## 2021-09-14 DIAGNOSIS — R35.1 NOCTURIA: ICD-10-CM

## 2021-09-14 LAB
APPEARANCE: CLEAR
BILIRUBIN: NEGATIVE
GLUCOSE (URINE DIPSTICK): NEGATIVE MG/DL
KETONES (URINE DIPSTICK): NEGATIVE MG/DL
LEUKOCYTES: NEGATIVE
MULTISTIX LOT#: ABNORMAL NUMERIC
NITRITE, URINE: NEGATIVE
PH, URINE: 6.5 (ref 4.5–8)
PROTEIN (URINE DIPSTICK): NEGATIVE MG/DL
SPECIFIC GRAVITY: 1.02 (ref 1–1.03)
URINE-COLOR: YELLOW
UROBILINOGEN,SEMI-QN: 0.2 MG/DL (ref 0–1.9)

## 2021-09-14 PROCEDURE — 81003 URINALYSIS AUTO W/O SCOPE: CPT | Performed by: UROLOGY

## 2021-09-14 PROCEDURE — 87493 C DIFF AMPLIFIED PROBE: CPT

## 2021-09-14 PROCEDURE — 51725 SIMPLE CYSTOMETROGRAM: CPT | Performed by: UROLOGY

## 2021-09-14 PROCEDURE — 52000 CYSTOURETHROSCOPY: CPT | Performed by: UROLOGY

## 2021-09-14 RX ORDER — CIPROFLOXACIN 500 MG/1
500 TABLET, FILM COATED ORAL ONCE
Status: DISCONTINUED | OUTPATIENT
Start: 2021-09-14 | End: 2021-09-14

## 2021-09-14 NOTE — PROGRESS NOTES
Rooming Clinician:     Rosalind Combs is a 80year old female. Patient presents with: Follow - Up: check urine and possible cystoscopy        HPI:     Patient returns for follow-up nation.   She has a history of nocturnal incontinence experiencing a s tablet (12.5 mg total) by mouth daily. 90 tablet 1   • magnesium 250 MG Oral Tab Take 250 mg by mouth daily. • niacin 500 MG Oral Tab Take 500 mg by mouth as needed.            Atorvastatin            OTHER (SEE COMMENTS)    Comment:Double Vision  [address] [place]. obtained and the patient was placed on the examination table in a dorsal lithotomy position. The urethral meatus was prepped in the usual fashion and 2% viscous xylocaine was injected into the urethral meatus by the medical assistant.   After an appropriat Nocturnal incontinence    PLAN:     I reviewed various reasons for nocturnal incontinence including sleep apnea, physiologic fluid retention that is excreted during the night and sleep disorders.   Patient had no effect taking Arielle Candle which has been [facility]

## 2021-09-15 ENCOUNTER — TELEPHONE (OUTPATIENT)
Dept: SURGERY | Facility: CLINIC | Age: 86
End: 2021-09-15

## 2021-09-15 ENCOUNTER — TELEPHONE (OUTPATIENT)
Dept: NEPHROLOGY | Facility: CLINIC | Age: 86
End: 2021-09-15

## 2021-09-15 DIAGNOSIS — I27.82 CHRONIC PULMONARY EMBOLISM WITH ACUTE COR PULMONALE, UNSPECIFIED PULMONARY EMBOLISM TYPE (HCC): Primary | ICD-10-CM

## 2021-09-15 DIAGNOSIS — I26.09 CHRONIC PULMONARY EMBOLISM WITH ACUTE COR PULMONALE, UNSPECIFIED PULMONARY EMBOLISM TYPE (HCC): Primary | ICD-10-CM

## 2021-09-15 DIAGNOSIS — A04.72 C. DIFFICILE ENTERITIS: ICD-10-CM

## 2021-09-15 LAB — C DIFF TOX B STL QL: POSITIVE

## 2021-09-15 RX ORDER — VANCOMYCIN HYDROCHLORIDE 125 MG/1
125 CAPSULE ORAL 4 TIMES DAILY
Qty: 40 CAPSULE | Refills: 0 | Status: SHIPPED | OUTPATIENT
Start: 2021-09-15 | End: 2021-09-25

## 2021-09-15 NOTE — TELEPHONE ENCOUNTER
Why did they not set up home health when she was discharged from the hospital in August.???  Can see if Mare Bashir can see her now.

## 2021-09-15 NOTE — TELEPHONE ENCOUNTER
Encounter routed to Dr. Scott Alanis. Trying currently to get Vancomycin antibiotic approved for C. Diff infection. PA has been completed and sent to the insurance. Awaiting decision.

## 2021-09-15 NOTE — TELEPHONE ENCOUNTER
Order for [facility] evaluation faxed. Vancomycin antibiotic approved for C. Diff infection.

## 2021-09-15 NOTE — TELEPHONE ENCOUNTER
Patients daughter Ples George calling to ask if Dr. Dean Tuttle has any home health recommendations. Please call at [phone], ok to leave detailed message, thanks.

## 2021-09-15 NOTE — TELEPHONE ENCOUNTER
Spoke with patient to get update on condition after cystoscopy. States that she had some dysuria yesterday and increased her water intake and feels better today.

## 2021-09-24 ENCOUNTER — TELEPHONE (OUTPATIENT)
Dept: SURGERY | Facility: CLINIC | Age: 86
End: 2021-09-24

## 2021-09-24 DIAGNOSIS — N39.0 RECURRENT UTI: Primary | ICD-10-CM

## 2021-09-26 ENCOUNTER — LAB ENCOUNTER (OUTPATIENT)
Dept: LAB | Facility: HOSPITAL | Age: 86
End: 2021-09-26
Attending: UROLOGY
Payer: MEDICARE

## 2021-09-26 PROCEDURE — 87186 SC STD MICRODIL/AGAR DIL: CPT | Performed by: UROLOGY

## 2021-09-26 PROCEDURE — 87086 URINE CULTURE/COLONY COUNT: CPT | Performed by: UROLOGY

## 2021-09-26 PROCEDURE — 87077 CULTURE AEROBIC IDENTIFY: CPT | Performed by: UROLOGY

## 2021-09-27 ENCOUNTER — TELEPHONE (OUTPATIENT)
Dept: NEPHROLOGY | Facility: CLINIC | Age: 86
End: 2021-09-27

## 2021-09-27 NOTE — TELEPHONE ENCOUNTER
Lexi Montes states patient reported that she has C. DIff and finished antibiotics but still watery stools. Wanted to know precautionary procedure with patient. Please call. Thank you.

## 2021-09-28 ENCOUNTER — TELEPHONE (OUTPATIENT)
Dept: NEPHROLOGY | Facility: CLINIC | Age: 86
End: 2021-09-28

## 2021-09-28 NOTE — TELEPHONE ENCOUNTER
Pt daughter called in stating pt is still having watery stools and was advised to contact GI to schedule an appt but it is booked out.  Please follow up

## 2021-09-28 NOTE — TELEPHONE ENCOUNTER
Hello GI nurses. Is there any way one of the GI doctors or APN can see patient sooner than first available.

## 2021-09-28 NOTE — TELEPHONE ENCOUNTER
Contacted Abran Dumas (on JANKI) and confirmed appointment for patient to see Dr. Rosemarie Tripathi on 9/30 at 3pm. Location/date/time information provided.     Your appointments     Date & Time Appointment Department [facility])    Sep 30, 2021  3:00 PM CDT Consult with Jony

## 2021-09-29 ENCOUNTER — TELEPHONE (OUTPATIENT)
Dept: NEPHROLOGY | Facility: CLINIC | Age: 86
End: 2021-09-29

## 2021-09-29 NOTE — TELEPHONE ENCOUNTER
JUANCARLOSI - Neyda/PT/[facility] states that pt requested to move PT evaluation to week on 10/3/21. Call if any questions.

## 2021-09-30 ENCOUNTER — OFFICE VISIT (OUTPATIENT)
Dept: GASTROENTEROLOGY | Facility: CLINIC | Age: 86
End: 2021-09-30
Payer: MEDICARE

## 2021-09-30 VITALS
BODY MASS INDEX: 36.26 KG/M2 | HEIGHT: 64 IN | HEART RATE: 61 BPM | WEIGHT: 212.38 LBS | SYSTOLIC BLOOD PRESSURE: 148 MMHG | DIASTOLIC BLOOD PRESSURE: 68 MMHG

## 2021-09-30 DIAGNOSIS — A49.8 RECURRENT CLOSTRIDIOIDES DIFFICILE INFECTION: Primary | ICD-10-CM

## 2021-09-30 PROCEDURE — 99204 OFFICE O/P NEW MOD 45 MIN: CPT | Performed by: INTERNAL MEDICINE

## 2021-09-30 NOTE — PATIENT INSTRUCTIONS
1. Take the fidaxomicin twice a day for 10 days. 2. Please update me on your progress at the end of treatmen    3.  Minimize dairy/lactose/milk, caffeine, sugars/juices, artificial sugars

## 2021-09-30 NOTE — H&P
[facility], [facility] - Gastroenterology                                                                                                  Clinic History and Physical Hx:   Family History   Problem Relation Age of Onset   • Heart Disorder Father 52        heart attack cause of death   • Musculo-skelatal Disorder Mother         osteoporosis      Social History: Social History    Tobacco Use      Smoking status: Never Smo a 80year old woman with history of BMI 36, chronic kidney disease, hypertension, hypercholesterolemia, PE, SVT, PVCs, constipation, GERD, non-alcoholic liver disease, urge incontinence, cystocele, multiple listed musculoskeletal problems, carotid stenosis

## 2021-10-06 NOTE — TELEPHONE ENCOUNTER
Neyda from [facility] called in stating that physical therapy was completed today. Pt will have visits 1 x first week, 2x 2nd week, and 1 visit on 3rd and 4th week.  Please follow up

## 2021-10-20 ENCOUNTER — TELEPHONE (OUTPATIENT)
Dept: GASTROENTEROLOGY | Facility: CLINIC | Age: 86
End: 2021-10-20

## 2021-10-20 NOTE — TELEPHONE ENCOUNTER
Dr. Khai Villavicencio,    Patient states since last OV visit she no longer is having watery stools. She is now having stringy, soft stools. She continues to have urgency, went 6 times yesterday, twice already today.  She has small amounts of stool each bowel moveme

## 2021-10-20 NOTE — TELEPHONE ENCOUNTER
Called and spoke to patient. Discussed based on description sounds like she is improving as was having 10-20 watery bowel movements a day. Discussed to continue her probiotic and monitor at this time. Appreciative of call.     MD Chico Benavidesjose rafael

## 2021-10-20 NOTE — TELEPHONE ENCOUNTER
Pt called to speak to RN about bowel movement issues that she is having. No other details given. Please call.

## 2021-11-05 ENCOUNTER — PATIENT OUTREACH (OUTPATIENT)
Dept: CASE MANAGEMENT | Age: 86
End: 2021-11-05

## 2021-11-05 ENCOUNTER — TELEPHONE (OUTPATIENT)
Dept: SURGERY | Facility: CLINIC | Age: 86
End: 2021-11-05

## 2021-11-05 DIAGNOSIS — N39.0 RECURRENT UTI: Primary | ICD-10-CM

## 2021-11-05 NOTE — PROGRESS NOTES
Contacted patient in regards to CCM enrollment. Patient is unsure if she would like to proceed with enrollment. Program information sent to patient. I will follow up with patient in 1-2 week for any additional questions.

## 2021-11-05 NOTE — TELEPHONE ENCOUNTER
Patient c/o increased urinary frequency. Recent C=dif infection but states she is feeling better and bowel movements are more normal (solid but soft). Cath urine culture ordered.

## 2021-11-06 ENCOUNTER — LAB ENCOUNTER (OUTPATIENT)
Dept: LAB | Facility: HOSPITAL | Age: 86
End: 2021-11-06
Attending: UROLOGY
Payer: MEDICARE

## 2021-11-06 DIAGNOSIS — N39.0 RECURRENT UTI: ICD-10-CM

## 2021-11-06 PROCEDURE — 87077 CULTURE AEROBIC IDENTIFY: CPT

## 2021-11-06 PROCEDURE — 87186 SC STD MICRODIL/AGAR DIL: CPT

## 2021-11-06 PROCEDURE — 87086 URINE CULTURE/COLONY COUNT: CPT

## 2021-11-09 ENCOUNTER — TELEPHONE (OUTPATIENT)
Dept: SURGERY | Facility: CLINIC | Age: 86
End: 2021-11-09

## 2021-11-09 ENCOUNTER — TELEPHONE (OUTPATIENT)
Dept: GASTROENTEROLOGY | Facility: CLINIC | Age: 86
End: 2021-11-09

## 2021-11-09 ENCOUNTER — TELEPHONE (OUTPATIENT)
Dept: NEPHROLOGY | Facility: CLINIC | Age: 86
End: 2021-11-09

## 2021-11-09 DIAGNOSIS — N39.46 MIXED STRESS AND URGE URINARY INCONTINENCE: Primary | ICD-10-CM

## 2021-11-09 RX ORDER — VANCOMYCIN HYDROCHLORIDE 125 MG/1
125 CAPSULE ORAL 2 TIMES DAILY
Qty: 28 CAPSULE | Refills: 0 | Status: SHIPPED | OUTPATIENT
Start: 2021-11-09 | End: 2021-11-23

## 2021-11-09 NOTE — TELEPHONE ENCOUNTER
Contacted patient and verified . Information below from Dr. Keya Lara was reviewed and patient verbalized understanding.

## 2021-11-09 NOTE — TELEPHONE ENCOUNTER
Spoke with patient and informed her she has a UTI. She is still experiencing frequency and pelvic pressure.   Informed her that Dr Jessie Moss and Dr Tod Mendez spoke and Dr Tod Mendez has sent a script for Vancomycin to take with the Macrobid 100 mg BID x 10 da

## 2021-11-09 NOTE — TELEPHONE ENCOUNTER
Rajan/ELÍAS states that he needs diagnosis confirmation for pt. He states that pt has UTI. Please call.

## 2021-11-09 NOTE — TELEPHONE ENCOUNTER
YAMILA Spoke with Jarod Muñoz- states pt has UTI and requesting code and orders to continue Purvi 78. Verbal order given .

## 2021-11-09 NOTE — TELEPHONE ENCOUNTER
Paged by Dr. Yesica Vasquez (patient's urologist) planning to start patient on macrobid for 10 days for urinary tract infection. Discussed will prescribe patient vancomycin prophylactically during the course of the macrobid and for 4 days after.     GI staff:    QUIANA

## 2021-11-09 NOTE — TELEPHONE ENCOUNTER
Pt has uti- being treated by urology Dr Grace Ballesteros and Dr. Arnel Tony urology.  Requesting an order to continue [facility] because pt has UTI

## 2021-11-09 NOTE — TELEPHONE ENCOUNTER
Addressed in other TE. Spoke with patient and is having increased urinary frequency and pelvic pressure.

## 2021-11-09 NOTE — TELEPHONE ENCOUNTER
[facility]   MD LAN RIVERO MD RUORU WONG, PA    TEL:  OFFICE: [phone]  DR VILLASENOR CELL: [phone]  HERMELINDA COLEMAN CELL: [phone]  DR. CAMARA CELL: [phone]      FROM 5 PM - 7 AM PLEASE CALL ANSWERING SERVICE: [phone]    RENAL FOLLOW UP NOTE--Date of Service 10-06-21 @ 10:22  --------------------------------------------------------------------------------  HPI:      Pt seen and examined at bedside.       PAST HISTORY  --------------------------------------------------------------------------------  No significant changes to PMH, PSH, FHx, SHx, unless otherwise noted    ALLERGIES & MEDICATIONS  --------------------------------------------------------------------------------  Allergies    penicillin (Unknown)    Intolerances    Biaxin (Other)    Standing Inpatient Medications  amitriptyline 10 milliGRAM(s) Oral at bedtime  ascorbic acid 500 milliGRAM(s) Oral daily  cefTRIAXone   IVPB 1000 milliGRAM(s) IV Intermittent every 24 hours  heparin   Injectable 5000 Unit(s) SubCutaneous every 8 hours  influenza   Vaccine 0.5 milliLiter(s) IntraMuscular once  lidocaine   4% Patch 1 Patch Transdermal every 24 hours  LORazepam     Tablet 2 milliGRAM(s) Oral two times a day  multivitamin 1 Tablet(s) Oral daily  perphenazine 2 milliGRAM(s) Oral at bedtime  polyethylene glycol 3350 17 Gram(s) Oral two times a day  senna 2 Tablet(s) Oral at bedtime  sodium chloride 0.9%. 1000 milliLiter(s) IV Continuous <Continuous>    PRN Inpatient Medications  acetaminophen   Tablet .. 650 milliGRAM(s) Oral every 6 hours PRN  acetaminophen   Tablet .. 650 milliGRAM(s) Oral every 6 hours PRN      REVIEW OF SYSTEMS  --------------------------------------------------------------------------------  General: no fever  MSK: no edema     VITALS/PHYSICAL EXAM  --------------------------------------------------------------------------------  T(C): 36.6 (10-06-21 @ 05:16), Max: 37.4 (10-05-21 @ 13:24)  HR: 77 (10-06-21 @ 05:16) (71 - 87)  BP: 157/75 (10-06-21 @ 05:16) (115/75 - 157/75)  RR: 18 (10-06-21 @ 05:16) (18 - 20)  SpO2: 94% (10-06-21 @ 05:16) (94% - 97%)  Wt(kg): --    Weight (kg): 77 (10-04-21 @ 21:42)      10-05-21 @ 07:01  -  10-06-21 @ 07:00  --------------------------------------------------------  IN: 2120 mL / OUT: 1150 mL / NET: 970 mL    10-06-21 @ 07:01  -  10-06-21 @ 10:22  --------------------------------------------------------  IN: 160 mL / OUT: 150 mL / NET: 10 mL      Physical Exam:  	Gen: NAD  	HEENT: MMM  	Pulm: CTA B/L  	CV: S1S2  	Abd: Soft, +BS  	Ext: No LE edema B/L                      Neuro: Awake   	Skin: Warm and Dry   	Vascular access: NO HD catheter            no  gretel  LABS/STUDIES  --------------------------------------------------------------------------------              10.1   2.46  >-----------<  189      [10-06-21 @ 07:07]              30.3     134  |  102  |  25  ----------------------------<  84      [10-06-21 @ 07:07]  3.9   |  21  |  1.28        Ca     9.2     [10-06-21 @ 07:07]    TPro  5.6  /  Alb  2.6  /  TBili  0.4  /  DBili  x   /  AST  97  /  ALT  25  /  AlkPhos  104  [10-06-21 @ 07:07]        Serum Osmolality 280      [10-05-21 @ 07:44]    Creatinine Trend:  SCr 1.28 [10-06 @ 07:07]  SCr 1.52 [10-05 @ 07:40]  SCr 1.43 [10-04 @ 08:21]    Urinalysis - [10-05-21 @ 17:05]      Color Yellow / Appearance Clear / SG 1.033 / pH 6.0      Gluc Negative / Ketone Negative  / Bili Negative / Urobili Negative       Blood Moderate / Protein 100 / Leuk Est Negative / Nitrite Negative      RBC 2 / WBC 3 / Hyaline 1 / Gran  / Sq Epi  / Non Sq Epi 2 / Bacteria Negative    Urine Creatinine 77      [10-05-21 @ 17:05]  Urine Sodium 29      [10-05-21 @ 17:05]  Urine Osmolality 509      [10-05-21 @ 17:05]    TSH 1.15      [10-05-21 @ 16:30]  Lipid: chol 87, , HDL 15, LDL --      [10-05-21 @ 16:30]       Spoke to Umberto (on JANKI) who asked if vancomycin needs PA. I informed her it does and I will follow up with patient once determination received.

## 2021-11-10 ENCOUNTER — APPOINTMENT (OUTPATIENT)
Dept: GENERAL RADIOLOGY | Facility: HOSPITAL | Age: 86
End: 2021-11-10
Attending: EMERGENCY MEDICINE
Payer: MEDICARE

## 2021-11-10 ENCOUNTER — HOSPITAL ENCOUNTER (OUTPATIENT)
Facility: HOSPITAL | Age: 86
Setting detail: OBSERVATION
Discharge: HOME | End: 2021-11-12
Attending: EMERGENCY MEDICINE
Payer: MEDICARE

## 2021-11-10 DIAGNOSIS — R06.02 SHORTNESS OF BREATH: Primary | ICD-10-CM

## 2021-11-10 PROCEDURE — 71045 X-RAY EXAM CHEST 1 VIEW: CPT | Performed by: EMERGENCY MEDICINE

## 2021-11-10 RX ORDER — METHYLPREDNISOLONE SODIUM SUCCINATE 125 MG/2ML
80 INJECTION, POWDER, LYOPHILIZED, FOR SOLUTION INTRAMUSCULAR; INTRAVENOUS ONCE
Status: COMPLETED | OUTPATIENT
Start: 2021-11-10 | End: 2021-11-10

## 2021-11-10 RX ORDER — ONDANSETRON 2 MG/ML
4 INJECTION INTRAMUSCULAR; INTRAVENOUS ONCE
Status: COMPLETED | OUTPATIENT
Start: 2021-11-10 | End: 2021-11-10

## 2021-11-10 NOTE — TELEPHONE ENCOUNTER
Contacted patient to let her know that vancomycin was approved by insurance. She is aware and pharmacy already contacted her. She also let me know that she had diarrhea this morning, not much stool.  Then had another bowel movement which was formed and v

## 2021-11-11 PROBLEM — R06.02 SHORTNESS OF BREATH: Status: ACTIVE | Noted: 2021-11-11

## 2021-11-11 PROCEDURE — 99220 INITIAL OBSERVATION CARE,LEVL III: CPT | Performed by: HOSPITALIST

## 2021-11-11 RX ORDER — CEFDINIR 300 MG/1
300 CAPSULE ORAL DAILY
Status: DISCONTINUED | OUTPATIENT
Start: 2021-11-11 | End: 2021-11-12

## 2021-11-11 RX ORDER — ACETAMINOPHEN 325 MG/1
650 TABLET ORAL EVERY 6 HOURS PRN
Status: DISCONTINUED | OUTPATIENT
Start: 2021-11-11 | End: 2021-11-12

## 2021-11-11 RX ORDER — NITROFURANTOIN 25; 75 MG/1; MG/1
100 CAPSULE ORAL 2 TIMES DAILY
COMMUNITY
End: 2021-11-12

## 2021-11-11 RX ORDER — HYDRALAZINE HYDROCHLORIDE 20 MG/ML
10 INJECTION INTRAMUSCULAR; INTRAVENOUS EVERY 4 HOURS PRN
Status: DISCONTINUED | OUTPATIENT
Start: 2021-11-11 | End: 2021-11-12

## 2021-11-11 RX ORDER — DILTIAZEM HYDROCHLORIDE 120 MG/1
240 CAPSULE, EXTENDED RELEASE ORAL DAILY
Status: DISCONTINUED | OUTPATIENT
Start: 2021-11-11 | End: 2021-11-11

## 2021-11-11 RX ORDER — METOPROLOL SUCCINATE 50 MG/1
50 TABLET, EXTENDED RELEASE ORAL DAILY
Status: DISCONTINUED | OUTPATIENT
Start: 2021-11-11 | End: 2021-11-12

## 2021-11-11 RX ORDER — VANCOMYCIN HYDROCHLORIDE 125 MG/1
125 CAPSULE ORAL 2 TIMES DAILY
Status: DISCONTINUED | OUTPATIENT
Start: 2021-11-11 | End: 2021-11-11

## 2021-11-11 RX ORDER — PANTOPRAZOLE SODIUM 40 MG/1
40 TABLET, DELAYED RELEASE ORAL
Status: DISCONTINUED | OUTPATIENT
Start: 2021-11-11 | End: 2021-11-12

## 2021-11-11 RX ORDER — FAMOTIDINE 10 MG/ML
20 INJECTION, SOLUTION INTRAVENOUS ONCE
Status: COMPLETED | OUTPATIENT
Start: 2021-11-11 | End: 2021-11-11

## 2021-11-11 RX ORDER — IPRATROPIUM BROMIDE AND ALBUTEROL SULFATE 2.5; .5 MG/3ML; MG/3ML
3 SOLUTION RESPIRATORY (INHALATION)
Status: DISCONTINUED | OUTPATIENT
Start: 2021-11-11 | End: 2021-11-12

## 2021-11-11 RX ORDER — VANCOMYCIN HYDROCHLORIDE 125 MG/1
125 CAPSULE ORAL 2 TIMES DAILY
Status: DISCONTINUED | OUTPATIENT
Start: 2021-11-12 | End: 2021-11-12

## 2021-11-11 RX ORDER — ONDANSETRON 2 MG/ML
4 INJECTION INTRAMUSCULAR; INTRAVENOUS EVERY 6 HOURS PRN
Status: DISCONTINUED | OUTPATIENT
Start: 2021-11-11 | End: 2021-11-12

## 2021-11-11 RX ORDER — HYDROCODONE BITARTRATE AND ACETAMINOPHEN 5; 325 MG/1; MG/1
1 TABLET ORAL EVERY 6 HOURS PRN
Status: DISCONTINUED | OUTPATIENT
Start: 2021-11-11 | End: 2021-11-12

## 2021-11-11 RX ORDER — VANCOMYCIN HYDROCHLORIDE 125 MG/1
125 CAPSULE ORAL DAILY
Status: DISCONTINUED | OUTPATIENT
Start: 2021-11-11 | End: 2021-11-11

## 2021-11-11 RX ORDER — NITROFURANTOIN 25; 75 MG/1; MG/1
100 CAPSULE ORAL 2 TIMES DAILY
Status: DISCONTINUED | OUTPATIENT
Start: 2021-11-11 | End: 2021-11-11

## 2021-11-11 RX ORDER — PREDNISONE 20 MG/1
20 TABLET ORAL
Status: DISCONTINUED | OUTPATIENT
Start: 2021-11-11 | End: 2021-11-12

## 2021-11-11 NOTE — ED INITIAL ASSESSMENT (HPI)
Pt to ed via ems for shortness of breath after taking vancomycin. Per ems, pt is on vanco d/t dx C diff. Pt sts she started it today. Audible wheezing noted. Ems sts 2 nebs given pta w/improvement.

## 2021-11-11 NOTE — H&P
[address] [facility] Patient Status:  Observation    1933 MRN [id number]   Location [facility] 5SW/SE Attending Lena Cummins MD   Hosp Day # 0 PCP Jack Barlow MD     Date:  2021 drugs.     Allergies:    Vancomycin              SHORTNESS OF BREATH  Atorvastatin            OTHER (SEE COMMENTS)    Comment:Double Vision  Statins                 OTHER (SEE COMMENTS)    Comment:Double vision    Home Medications:  Prior to Admission Medic 18  BP: (127-203)/(45-92) 127/45    General:  Alert and oriented. Diffuse skin problem:  None. Eye:  Pupils are equal, round and reactive to light, extraocular movements are intact, Normal conjunctiva.   HENT:  Normocephalic, oral mucosa is moist.  Head: physician  Garo Mc MD    Disposition  Clinical course will dictate outcome      Yael Lacey MD  11/11/2021  7:22 AM

## 2021-11-11 NOTE — PROGRESS NOTES
Pt seen and examined. Pt admitted and H and P done by my partner earlier this am.    Pt with sudden on set of bronchospasm possibly due to PO vancomycin or PO macrobid. ID consult to assess the need for these meds. Check UA. Check C-diff toxin.     >3

## 2021-11-11 NOTE — CM/SW NOTE
SW received MDO for home health. Per [facility] liaison, patient is current with [facility] for services. KATIE order placed. Plan: Home w/ [facility] pending medical clearance    Addendum 11/12/2021:  YOAN received MDO for DC. Notified [facility] liaison.  No further DC needs iden

## 2021-11-11 NOTE — PLAN OF CARE
Problem: Patient Centered Care  Goal: Patient preferences are identified and integrated in the patient's plan of care  Description: Interventions:  - What would you like us to know as we care for you?  From home alone  - Provide timely, complete, and accu Progressing     Problem: SKIN/TISSUE INTEGRITY - ADULT  Goal: Skin integrity remains intact  Description: INTERVENTIONS  - Assess and document risk factors for pressure ulcer development  - Assess and document skin integrity  - Monitor for areas of redness

## 2021-11-11 NOTE — CONSULTS
[address] [facility] Patient Status:  Observation    1933 MRN [id number]   Location [facility] 5SW/SE Attending Viktoriya Mccormick MD   Hosp Day # 0 PCP Carolina Sparrow MD       Reason f day. She has never used smokeless tobacco. She reports current alcohol use. She reports that she does not use drugs.     Allergies:    Vancomycin              SHORTNESS OF BREATH  Atorvastatin            OTHER (SEE COMMENTS)    Comment:Double Vision  Statin 132/44, pulse 63, temperature 97.8 °F (36.6 °C), temperature source Oral, resp. rate 16, height 5' 4\" (1.626 m), weight 209 lb 6.4 oz (95 kg), SpO2 96 %. General: Awake, in bed  HEENT: Moist mucous membranes. EOMI  Neck: No lymphadenopathy. Supple.   C imaging reports, available old records. -  Case d/w patient, RN. Thank you for allowing us to participate in the care of this patient. Please do not hesitate to call if you have any questions.    We will continue to follow with you and will make further

## 2021-11-11 NOTE — ED PROVIDER NOTES
Patient Seen in: [facility] Emergency Department      History   Patient presents with:  Difficulty Breathing  Medication Reaction    Stated Complaint: Medication reaction/dyspnea    Subjective:   HPI  Patient is a 80-year-old female history of SVT All other systems reviewed and negative except as noted above.     Physical Exam     ED Triage Vitals [11/10/21 2135]   BP (!) 203/92   Pulse 89   Resp (!) 32   Temp 99.6 °F (37.6 °C)   Temp src Temporal   SpO2 96 %   O2 Device None (Room air)       Cur Calculated Osmolality 299 (*)     GFR, Non- 38 (*)     GFR, -American 44 (*)     All other components within normal limits   PRO BETA NATRIURETIC PEPTIDE - Normal   RAPID SARS-COV-2 BY PCR - Normal   CBC WITH DIFFERENTIAL WITH PLATEL aspiration? .  Chest x-ray with bilateral interstitial edema, no focal infiltrate. We will hold antibiotics at this time. Labs including BNP reassuring. Low suspicion for heart failure.   Patient observed for intermittent tachypnea requiring serial nebuli

## 2021-11-11 NOTE — ED QUICK NOTES
Orders for admission, patient is aware of plan and ready to go upstairs. Any questions, please call ED RN Sakshi Hernandez  at extension [phone].    Type of COVID test sent:Rapid  COVID Suspicion level: Low    Titratable drug(s) infusing:N/A  Rate:N/A    LOC at time o

## 2021-11-12 VITALS
WEIGHT: 211.63 LBS | SYSTOLIC BLOOD PRESSURE: 121 MMHG | HEIGHT: 64 IN | DIASTOLIC BLOOD PRESSURE: 43 MMHG | TEMPERATURE: 98 F | OXYGEN SATURATION: 95 % | BODY MASS INDEX: 36.13 KG/M2 | HEART RATE: 54 BPM | RESPIRATION RATE: 18 BRPM

## 2021-11-12 PROCEDURE — 99217 OBSERVATION CARE DISCHARGE: CPT | Performed by: HOSPITALIST

## 2021-11-12 RX ORDER — CEFDINIR 300 MG/1
300 CAPSULE ORAL DAILY
Qty: 10 CAPSULE | Refills: 0 | Status: SHIPPED | OUTPATIENT
Start: 2021-11-13 | End: 2021-11-17 | Stop reason: ALTCHOICE

## 2021-11-12 RX ORDER — POTASSIUM CHLORIDE 20 MEQ/1
40 TABLET, EXTENDED RELEASE ORAL ONCE
Status: COMPLETED | OUTPATIENT
Start: 2021-11-12 | End: 2021-11-12

## 2021-11-12 RX ORDER — PREDNISONE 20 MG/1
20 TABLET ORAL
Qty: 3 TABLET | Refills: 0 | Status: SHIPPED | OUTPATIENT
Start: 2021-11-13 | End: 2021-11-16

## 2021-11-12 RX ORDER — ALBUTEROL SULFATE 90 UG/1
1-2 AEROSOL, METERED RESPIRATORY (INHALATION) EVERY 6 HOURS PRN
Qty: 1 EACH | Refills: 1 | Status: SHIPPED | OUTPATIENT
Start: 2021-11-12

## 2021-11-12 NOTE — DISCHARGE SUMMARY
[facility]    Discharge Summary    Ronnie Campbell Patient Status:  Observation    1933 MRN [id number]   Location [facility] 5SW/SE Attending Raisa Arrington MD   Hosp Day # 0 PCP Martha Galarza MD     Date of Admission: Location: Right arm)   Pulse 54   Temp 97.8 °F (36.6 °C) (Oral)   Resp 18   Ht 5' 4\" (1.626 m)   Wt 211 lb 9.6 oz (96 kg)   SpO2 95%   BMI 36.32 kg/m²     Gen:  NAD. A and O x  3  CV:   RRR.   No m/g/r  Pulm:   CTA bilat  Abd:   +bs, soft, NT, ND  LE:  No obstructive sleep apnea  BMI 35.9.   Patient counseled regarding diet exercise once current issues resolve, consider CPAP at night.     DVT prophylaxis  Xarelto     CODE STATUS  Full    Consultations:   ID    Discharge Condition:  Good    Discharge [address]  Se tablet  Refills: 0     vancomycin 125 MG Caps  Commonly known as: VANCOCIN      Take 1 capsule (125 mg total) by mouth 2 (two) times a day for 14 days.    Stop taking on: November 23, 2021  Quantity: 28 capsule  Refills: 0        STOP taking these medicatio

## 2021-11-12 NOTE — PLAN OF CARE
Problem: Patient Centered Care  Goal: Patient preferences are identified and integrated in the patient's plan of care  Description: Interventions:  - What would you like us to know as we care for you?  From home alone  - Provide timely, complete, and accu INTERVENTIONS:  - Assess for changes in respiratory status  - Assess for changes in mentation and behavior  - Position to facilitate oxygenation and minimize respiratory effort  - Oxygen supplementation based on oxygen saturation or ABGs  - Provide Smoking

## 2021-11-12 NOTE — PROGRESS NOTES
Discharge RN Summary: Patient has discharge order in. Patient to discharge home with family. IV removed by NATASHA Allen. Understands to follow up with PCP in 1 week. Patient understands to  meds from pharmacy.        Scripts sent with pt: none  Electron

## 2021-11-12 NOTE — PLAN OF CARE
Vital signs stable on room air. Tele monitor in place, one call for HR of 39. Vital signs within normal limits, pt denied pain, shortness of breath, and dizziness. On scheduled nebulizer treatments.  MD notified, diltiazem discontinued, no parameters ordere based on assessment  - Modify environment to reduce risk of injury  - Provide assistive devices as appropriate  - Consider OT/PT consult to assist with strengthening/mobility  - Encourage toileting schedule  Outcome: Progressing     Problem: RESPIRATORY -

## 2021-11-12 NOTE — PROGRESS NOTES
INFECTIOUS DISEASE PROGRESS NOTE  [facility] - [facility] [place] ID PROGRESS NOTE    Antionejosé Clark Patient Status:  Observation    1933 MRN [id number]   Location [facility] 5SW/SE Attending No att. providers found   Hosp Day # 0 claudication     Primary osteoarthritis of both hips     Gait disturbance     Iliotibial band syndrome of left side     Urge incontinence     Nocturia     Cystocele, midline     Constipation     Abnormal urinalysis     Abdominal pain, acute     Fever in ot

## 2021-11-15 ENCOUNTER — TELEPHONE (OUTPATIENT)
Dept: NEPHROLOGY | Facility: CLINIC | Age: 86
End: 2021-11-15

## 2021-11-15 ENCOUNTER — PATIENT OUTREACH (OUTPATIENT)
Dept: CASE MANAGEMENT | Age: 86
End: 2021-11-15

## 2021-11-15 DIAGNOSIS — R82.90 ABNORMAL URINALYSIS: ICD-10-CM

## 2021-11-15 DIAGNOSIS — R06.2 WHEEZE: ICD-10-CM

## 2021-11-15 DIAGNOSIS — Z02.9 ENCOUNTERS FOR UNSPECIFIED ADMINISTRATIVE PURPOSE: ICD-10-CM

## 2021-11-15 DIAGNOSIS — N39.0 URINARY TRACT INFECTION WITHOUT HEMATURIA, SITE UNSPECIFIED: ICD-10-CM

## 2021-11-15 DIAGNOSIS — J98.01 BRONCHOSPASM: ICD-10-CM

## 2021-11-15 DIAGNOSIS — R06.02 SHORTNESS OF BREATH: ICD-10-CM

## 2021-11-15 PROCEDURE — 1111F DSCHRG MED/CURRENT MED MERGE: CPT

## 2021-11-15 NOTE — PROGRESS NOTES
Initial Post Discharge Follow Up   Discharge Date: 11/12/21  Contact Date: 11/15/2021    Consent Verification:  Assessment Completed With: Patient  HIPAA Verified?   Yes    Discharge Dx:     Bronchospasm  SOB  Hx of PE  UTI with Citrobacter freundii  Hx diagnoses? No  • Are you able to perform normal daily activities of living as you have prior to your hospital stay (dressing, bathing, ambulating to the bathroom, etc)?  yes  • (NCM) Was patient given a different diet per AVS? no, still following low sodium missing anything? yes  • Are there any reasons that keep you from taking your medication as prescribed? No  Are you having any concerns with constipation? No, patient states that her stools are becoming more formed.   Referrals/orders at D/C:  Home Health/Se [address] [facility] [place] [address] ([address] [facility] [address])        Mar 08, 2022 10:00 AM CST Follow Up Visit with Eileen Wood MD Cardiology - [address] - [address] [place] [address])    For the safety of our patients, visitors and care instructed patient to call PCP with any questions or needs, she states she will. CCM referral placed:  No, CCM enrollment in process.     BOOK BY DATE: 11/26/2021    [x]  Discharge Summary, Discharge medications reviewed/discussed/and reconciled against

## 2021-11-16 ENCOUNTER — TELEPHONE (OUTPATIENT)
Dept: NEPHROLOGY | Facility: CLINIC | Age: 86
End: 2021-11-16

## 2021-11-16 ENCOUNTER — OFFICE VISIT (OUTPATIENT)
Dept: NEPHROLOGY | Facility: CLINIC | Age: 86
End: 2021-11-16
Payer: MEDICARE

## 2021-11-16 VITALS
SYSTOLIC BLOOD PRESSURE: 147 MMHG | BODY MASS INDEX: 36.7 KG/M2 | HEIGHT: 64 IN | HEART RATE: 59 BPM | WEIGHT: 215 LBS | DIASTOLIC BLOOD PRESSURE: 73 MMHG

## 2021-11-16 DIAGNOSIS — D64.9 ANEMIA, UNSPECIFIED TYPE: ICD-10-CM

## 2021-11-16 DIAGNOSIS — N18.31 STAGE 3A CHRONIC KIDNEY DISEASE (HCC): ICD-10-CM

## 2021-11-16 DIAGNOSIS — I10 PRIMARY HYPERTENSION: Primary | ICD-10-CM

## 2021-11-16 PROCEDURE — 1111F DSCHRG MED/CURRENT MED MERGE: CPT | Performed by: INTERNAL MEDICINE

## 2021-11-16 PROCEDURE — 99214 OFFICE O/P EST MOD 30 MIN: CPT | Performed by: INTERNAL MEDICINE

## 2021-11-16 NOTE — TELEPHONE ENCOUNTER
YAMILA Flores requesting verbal order for [facility] to visit pt today. Pt has apt with Dr. Juliette Garza today on 11/16/21.

## 2021-11-16 NOTE — TELEPHONE ENCOUNTER
Spoke with Gisella Ibarra and let him know that per Dr. Michelle Young it was okay for him to have a visit with pt today.

## 2021-11-16 NOTE — TELEPHONE ENCOUNTER
NCM confirmed with patient that she has a HFU on 11/16/2021 at 1:40 pm.    Patient needs TCM appt scheduled by 11/19/2021, please ask Dr. Norma Alexandre if the visit type can be changed to a  TCM visit type, for the visit scheduled on 11/16/2021 at 1:40 pm.    Th

## 2021-11-16 NOTE — TELEPHONE ENCOUNTER
Caden Uribe is requesting nursing evaluation visit for today.  Ulises Ivory states pt was recently in the hospital. Please call for verbal approval [phone]

## 2021-11-17 NOTE — PROGRESS NOTES
[facility]  Nephrology Daily Progress Note    Judge Castro  [id number]  80year old  Patient presents with:  Hospital F/U: Follow up after discharge on 11/12/21      HPI:   Judge Castro is a 80year old female.   59-year-old female with a histo disturbance  Psychiatric:  Negative for inappropriate interaction and psychiatric symptoms  Respiratory:  Negative for cough, dyspnea and wheezing      PHYSICAL EXAM:     Vitals History 11/12/2021 11/16/2021 11/16/2021   /43 - 147/73   BP Location Ri OSMOCALC 293   < > 302*   GFRNAA 43*   < > 39*   GFRAA 49*   < > 45*    < > = values in this interval not displayed. Imaging  No results found.     Medications:    Current Outpatient Medications:   •  albuterol 108 (90 Base) MCG/ACT Inhalation Aero hypercholesterolemia     Carotid stenosis     Dyslipidemia     Paroxysmal SVT (supraventricular tachycardia) ([facility])     Bilateral carotid artery stenosis     CKD (chronic kidney disease) stage 3, GFR 30-59 ml/min ([facility])     PVCs (premature ventricular contra

## 2021-11-18 ENCOUNTER — LAB ENCOUNTER (OUTPATIENT)
Dept: LAB | Age: 86
End: 2021-11-18
Attending: INTERNAL MEDICINE
Payer: MEDICARE

## 2021-11-18 DIAGNOSIS — D64.9 ANEMIA, UNSPECIFIED TYPE: ICD-10-CM

## 2021-11-18 DIAGNOSIS — N18.31 STAGE 3A CHRONIC KIDNEY DISEASE (HCC): ICD-10-CM

## 2021-11-18 PROCEDURE — 36415 COLL VENOUS BLD VENIPUNCTURE: CPT

## 2021-11-18 PROCEDURE — 83540 ASSAY OF IRON: CPT

## 2021-11-18 PROCEDURE — 84466 ASSAY OF TRANSFERRIN: CPT

## 2021-11-18 PROCEDURE — 85025 COMPLETE CBC W/AUTO DIFF WBC: CPT

## 2021-11-18 PROCEDURE — 80069 RENAL FUNCTION PANEL: CPT

## 2021-11-18 NOTE — PATIENT INSTRUCTIONS
Please do follow-up laboratory studies as ordered. Let me know if your overall strength is not improving.

## 2021-11-20 ENCOUNTER — PATIENT MESSAGE (OUTPATIENT)
Dept: GASTROENTEROLOGY | Facility: CLINIC | Age: 86
End: 2021-11-20

## 2021-11-22 NOTE — TELEPHONE ENCOUNTER
From: Judge Castro  To: Gerhard Melendez MD  Sent: 11/20/2021 1:02 PM CST  Subject: Medication    This Jordin Solis Pat's daughter when she was recently released from hospital she was sent home with cefdiner and vanco due to UTI and h/o cdif.  When she finis

## 2021-11-23 RX ORDER — PANTOPRAZOLE SODIUM 40 MG/1
TABLET, DELAYED RELEASE ORAL
Qty: 30 TABLET | Refills: 1 | Status: SHIPPED | OUTPATIENT
Start: 2021-11-23 | End: 2021-11-23

## 2021-11-23 RX ORDER — PANTOPRAZOLE SODIUM 40 MG/1
TABLET, DELAYED RELEASE ORAL
Qty: 90 TABLET | Refills: 0 | Status: SHIPPED | OUTPATIENT
Start: 2021-11-23

## 2021-11-23 RX ORDER — TROSPIUM CHLORIDE 20 MG/1
TABLET, FILM COATED ORAL
Qty: 60 TABLET | Refills: 3 | OUTPATIENT
Start: 2021-11-23

## 2021-11-23 NOTE — TELEPHONE ENCOUNTER
To: iRca Coy      From: Paz Randolph      Created: 11/23/2021 8:13 AM        Jose Parekh,     Dr. Ty Rodríguez would like you to complete the course of vancomycin (for 5 days after finishing the other antibiotic/antibiotic for her UTI).

## 2021-11-23 NOTE — TELEPHONE ENCOUNTER
TC to pt regarding refill request for her trospium 20mg . Pt reports she is not taking this medication. Cannot remember how long she took it for. Pt has been in and out of the hosp 3 x since our LV. Pt dealing with other issues.  Will call if she ever wants

## 2021-11-23 NOTE — TELEPHONE ENCOUNTER
Patient should finish complete course of vancomycin (for 5 days after finishing the other antibiotic/antibiotic for her UTI)    Jazzy Rodríguez MD  [address] [place] - Gastroenterology  11/22/2021  6:04 PM

## 2021-11-29 ENCOUNTER — TELEPHONE (OUTPATIENT)
Dept: NEPHROLOGY | Facility: CLINIC | Age: 86
End: 2021-11-29

## 2021-11-29 RX ORDER — RIVAROXABAN 20 MG/1
TABLET, FILM COATED ORAL
Qty: 90 TABLET | Refills: 0 | Status: SHIPPED | OUTPATIENT
Start: 2021-11-29

## 2021-11-29 NOTE — TELEPHONE ENCOUNTER
Neyda from  [facility] physical therapy states that physical therapy eval was done today , plan is to see pt 2 visits first week then 1 visit on the 2nd and 1 visit on the 3rd week, then 1 visit on the 4th week.

## 2021-12-04 ENCOUNTER — OFFICE VISIT (OUTPATIENT)
Dept: INTERNAL MEDICINE CLINIC | Facility: CLINIC | Age: 86
End: 2021-12-04
Payer: MEDICARE

## 2021-12-04 VITALS
OXYGEN SATURATION: 97 % | SYSTOLIC BLOOD PRESSURE: 134 MMHG | BODY MASS INDEX: 36.19 KG/M2 | WEIGHT: 212 LBS | HEIGHT: 64 IN | DIASTOLIC BLOOD PRESSURE: 78 MMHG | HEART RATE: 63 BPM

## 2021-12-04 DIAGNOSIS — R19.7 DIARRHEA, UNSPECIFIED TYPE: Primary | ICD-10-CM

## 2021-12-04 DIAGNOSIS — I10 PRIMARY HYPERTENSION: ICD-10-CM

## 2021-12-04 DIAGNOSIS — R60.0 LOCALIZED EDEMA: ICD-10-CM

## 2021-12-04 PROCEDURE — 99204 OFFICE O/P NEW MOD 45 MIN: CPT | Performed by: INTERNAL MEDICINE

## 2021-12-04 RX ORDER — TROSPIUM CHLORIDE 20 MG/1
TABLET, FILM COATED ORAL
COMMUNITY
Start: 2021-10-20

## 2021-12-04 NOTE — PROGRESS NOTES
Subjective:   Patient ID: Ronnie Campbell is a 80year old female. Hypertension  Pertinent negatives include no chest pain or palpitations. Abdominal Pain  Associated symptoms include diarrhea. Patient here for getting established.  Having ongoing i Exam  Constitutional:       Appearance: She is obese. HENT:      Head: Normocephalic. Mouth/Throat:      Pharynx: No oropharyngeal exudate. Eyes:      Conjunctiva/sclera: Conjunctivae normal.   Neck:      Vascular: Carotid bruit present.    Cardiov

## 2021-12-06 ENCOUNTER — TELEPHONE (OUTPATIENT)
Dept: INTERNAL MEDICINE CLINIC | Facility: CLINIC | Age: 86
End: 2021-12-06

## 2021-12-06 ENCOUNTER — NURSE ONLY (OUTPATIENT)
Dept: INTERNAL MEDICINE CLINIC | Facility: CLINIC | Age: 86
End: 2021-12-06
Payer: MEDICARE

## 2021-12-06 DIAGNOSIS — R19.5 LOOSE STOOLS: ICD-10-CM

## 2021-12-06 PROCEDURE — 87493 C DIFF AMPLIFIED PROBE: CPT | Performed by: INTERNAL MEDICINE

## 2021-12-06 NOTE — TELEPHONE ENCOUNTER
Diabetes/Glucose Control    • Glucose maintained within prescribed range Not Progressing        Patient/Family Goals    • Patient/Family Long Term Goal Not Progressing          Impaired Functional Mobility    • Achieve highest/safest level of mobility/gait Patient called as her daughter went to drop off stool sample at [facility] and no order has been entered for a stool sample test.    She saw Dr. Nance Grandangelica on Saturday.     Asking if she can receive a call back as soon as possible as she doesn't want the stool sa

## 2021-12-17 ENCOUNTER — TELEPHONE (OUTPATIENT)
Dept: NEPHROLOGY | Facility: CLINIC | Age: 86
End: 2021-12-17

## 2021-12-17 NOTE — TELEPHONE ENCOUNTER
Ollie Maddox, [address] [place] [address] calling patient is requesting discharge from occupational therapy. Please call at [phone],POJUEI.

## 2021-12-17 NOTE — TELEPHONE ENCOUNTER
Spoke with Boone Mesa at [facility] who states the patient's OT therapist was injured so patient was offered to work with a new therapist or be discharged and she chose to be discharged. She will be continuing with her regular nurse visits.

## 2021-12-23 ENCOUNTER — TELEPHONE (OUTPATIENT)
Dept: INTERNAL MEDICINE CLINIC | Facility: CLINIC | Age: 86
End: 2021-12-23

## 2021-12-23 ENCOUNTER — LAB ENCOUNTER (OUTPATIENT)
Dept: LAB | Facility: HOSPITAL | Age: 86
End: 2021-12-23
Attending: INTERNAL MEDICINE
Payer: MEDICARE

## 2021-12-23 DIAGNOSIS — A09 DIARRHEA OF INFECTIOUS ORIGIN: ICD-10-CM

## 2021-12-23 PROCEDURE — 87493 C DIFF AMPLIFIED PROBE: CPT

## 2021-12-23 NOTE — TELEPHONE ENCOUNTER
Message from PCP:  MD Osmani Marrero, RN  Caller: Unspecified (Today,  8:14 AM)  She has had c diff infection in past , if this is reminecent of that she will need Vanco orally.  Best for her to probably do a c diff stool test.  --D. P.

## 2021-12-23 NOTE — TELEPHONE ENCOUNTER
Stools are pushed out from gas build up and then comes out very mucousey. Has been taking probiotic. Continually. Patient will submit stool sample for testing. Order needed. Daughter an attain the container needed.

## 2021-12-23 NOTE — TELEPHONE ENCOUNTER
Patient said for 3 or so days she has diarrhea. Also the diarrhea has puss. he has had a bowl infection in the past.    No fever, sore throat, chills, cough, sore throat. Blood pressure is good. Was vaccinated, didn't get the booster.     Asking what

## 2021-12-26 ENCOUNTER — MOBILE ENCOUNTER (OUTPATIENT)
Dept: INTERNAL MEDICINE CLINIC | Facility: CLINIC | Age: 86
End: 2021-12-26

## 2021-12-26 RX ORDER — VANCOMYCIN HYDROCHLORIDE 250 MG/1
250 CAPSULE ORAL 4 TIMES DAILY
Qty: 40 CAPSULE | Refills: 0 | Status: SHIPPED | OUTPATIENT
Start: 2021-12-26 | End: 2022-01-03

## 2021-12-27 ENCOUNTER — TELEPHONE (OUTPATIENT)
Dept: INTERNAL MEDICINE CLINIC | Facility: CLINIC | Age: 86
End: 2021-12-27

## 2021-12-27 RX ORDER — HYDROCHLOROTHIAZIDE 12.5 MG/1
TABLET ORAL
Qty: 90 TABLET | Refills: 0 | OUTPATIENT
Start: 2021-12-27

## 2021-12-30 RX ORDER — HYDROCHLOROTHIAZIDE 12.5 MG/1
12.5 TABLET ORAL DAILY
Qty: 90 TABLET | Refills: 1 | Status: SHIPPED | OUTPATIENT
Start: 2021-12-30

## 2022-01-03 ENCOUNTER — TELEPHONE (OUTPATIENT)
Dept: INTERNAL MEDICINE CLINIC | Facility: CLINIC | Age: 87
End: 2022-01-03

## 2022-01-06 ENCOUNTER — TELEPHONE (OUTPATIENT)
Dept: INTERNAL MEDICINE CLINIC | Facility: CLINIC | Age: 87
End: 2022-01-06

## 2022-01-06 NOTE — TELEPHONE ENCOUNTER
Spoke w nurse and has full ROm in the arm post fall mostly rib pain. Taking tylenol with relief. Extra nursing visit approved.

## 2022-01-06 NOTE — TELEPHONE ENCOUNTER
Kang Chávez from Home health called wanting to add to pt chart Dr Nabil Cheng as pt's PCP 2) pt had a fall on Tuesday 1/6 she has a large bruise on her L upper arm also has been complaining of pain on her L side of her ribs no bruising noted there.  Kang Chávez would like

## 2022-01-19 ENCOUNTER — TELEPHONE (OUTPATIENT)
Dept: INTERNAL MEDICINE CLINIC | Facility: CLINIC | Age: 87
End: 2022-01-19

## 2022-01-19 NOTE — TELEPHONE ENCOUNTER
Emile Atkins called b/c she needs an order for recertification for pt. Also, related to pt falling continue COPD education and assessment .

## 2022-01-20 ENCOUNTER — TELEPHONE (OUTPATIENT)
Dept: GASTROENTEROLOGY | Facility: CLINIC | Age: 87
End: 2022-01-20

## 2022-01-20 NOTE — TELEPHONE ENCOUNTER
Likely has weakness of the sphincter  Not diarrhea if not liquid, just soft  Use wet towelettes (Kleenix, Tucks, Christofer) to clean the anal area after bowel movements and then pat dry the area with dry toilet paper.   DO NOT rub the area with dry toilet pap

## 2022-01-20 NOTE — TELEPHONE ENCOUNTER
Dr. Dante Nolan (office on call),    I spoke to patient who states she is continuing to have issues with bowels. Stool is not runny but has mushy/wet mud little bits without trying. States she goes to urinate and will notice little stool bits in the toilet.  She

## 2022-01-20 NOTE — TELEPHONE ENCOUNTER
Pt states she has had a bowel infection since November that is not getting better.  Please call [phone]

## 2022-01-20 NOTE — TELEPHONE ENCOUNTER
Contacted patient and reviewed message below. Patient verbalized understanding. She will continue to monitor for now. I advised to call back with any changes, I will follow up if Dr. Virginia Nguyen has additional recommendations.

## 2022-01-23 NOTE — TELEPHONE ENCOUNTER
Thank you so much Atena! Agree with Dr. Pina Abo excellent recommendations    Thanks again!     Ingrid Mahajan MD  [facility], [facility] - Gastroenterology  1/23/2022  2:20 PM

## 2022-01-31 ENCOUNTER — PATIENT MESSAGE (OUTPATIENT)
Dept: SURGERY | Facility: CLINIC | Age: 87
End: 2022-01-31

## 2022-01-31 NOTE — TELEPHONE ENCOUNTER
Spoke with patient and per Sherri cath urine specimen will be collected for urine culture. From: Cj Breen  To: Pipe Ramirez MD  Sent: 1/31/2022  7:25 AM CST  Subject: Possible UTI    Requesting an order for a urine culture.  Urine is lo

## 2022-02-01 ENCOUNTER — NURSE ONLY (OUTPATIENT)
Dept: SURGERY | Facility: CLINIC | Age: 87
End: 2022-02-01
Payer: MEDICARE

## 2022-02-01 DIAGNOSIS — N39.0 RECURRENT UTI: Primary | ICD-10-CM

## 2022-02-03 ENCOUNTER — TELEPHONE (OUTPATIENT)
Dept: GASTROENTEROLOGY | Facility: CLINIC | Age: 87
End: 2022-02-03

## 2022-02-03 ENCOUNTER — TELEPHONE (OUTPATIENT)
Dept: SURGERY | Facility: CLINIC | Age: 87
End: 2022-02-03

## 2022-02-03 RX ORDER — VANCOMYCIN HYDROCHLORIDE 125 MG/1
125 CAPSULE ORAL 2 TIMES DAILY
Qty: 20 CAPSULE | Refills: 0 | Status: SHIPPED | OUTPATIENT
Start: 2022-02-03 | End: 2022-02-13

## 2022-02-03 RX ORDER — SULFAMETHOXAZOLE AND TRIMETHOPRIM 800; 160 MG/1; MG/1
1 TABLET ORAL 2 TIMES DAILY
Qty: 10 TABLET | Refills: 0 | Status: SHIPPED | OUTPATIENT
Start: 2022-02-03 | End: 2022-03-12

## 2022-02-03 RX ORDER — CIPROFLOXACIN 500 MG/1
500 TABLET, FILM COATED ORAL 2 TIMES DAILY
Qty: 20 TABLET | Refills: 0 | Status: SHIPPED | OUTPATIENT
Start: 2022-02-03 | End: 2022-02-13

## 2022-02-03 NOTE — TELEPHONE ENCOUNTER
Dr Lluvia Connelly spoke with Dr Margarito Barrett and patient to start Cipro 500 mg 1 tab BID for 10 days. Dr Margarito Barrett will prescribe Vancomycin. Patient notified to not take the generic bactrim but to start Cipro and Vancomycin as directed. Verbalized understanding.

## 2022-02-03 NOTE — TELEPHONE ENCOUNTER
Contacted patient and verified . I informed patient that Dr. Damion Hernandez spoke to Dr. Ric Harvey and will send vancomycin to take during course of macrobid and continue for 4 days after. Patient verbalized understanding.

## 2022-02-03 NOTE — TELEPHONE ENCOUNTER
Tiffany/Dr. Brown's office is requesting Dr. Arnaud Yoo to contact Dr. Jeremias Tamez.  Please call [phone]

## 2022-02-03 NOTE — TELEPHONE ENCOUNTER
Dr. Mando Perkins,    I contacted Danette Lagunas with Dr. Srinivasan Jesus office. She states patient has UTI again and with history of C.diff, Dr. Cayetano Jones would like to discuss what to prescribe. Please call [phone], thank you.

## 2022-02-04 ENCOUNTER — TELEPHONE (OUTPATIENT)
Dept: SURGERY | Facility: CLINIC | Age: 87
End: 2022-02-04

## 2022-02-04 NOTE — TELEPHONE ENCOUNTER
Spoke with patient and states after reading all the side effects from Cipro she would prefer not to take it. Per Dr Shipman Rule she can start the Bactrim DS BID for 10 days. Verbalized understanding.

## 2022-02-08 ENCOUNTER — TELEPHONE (OUTPATIENT)
Dept: INTERNAL MEDICINE CLINIC | Facility: CLINIC | Age: 87
End: 2022-02-08

## 2022-02-08 NOTE — TELEPHONE ENCOUNTER
Adding Dr. Alyse Machado and Shelly Wilder for giving/approving orders. Bowels very soft and has BM each time she urinates. This is causing a bit of incontinent issues. Can patient have somekind of fiber added to diet, e.g. metamucil? If patient agrees to take it, RN would like to F/U with patient one week after starting the metamucil. This would be an extra visit. RN gave OK to all requests.

## 2022-03-01 ENCOUNTER — TELEPHONE (OUTPATIENT)
Dept: NEPHROLOGY | Facility: CLINIC | Age: 87
End: 2022-03-01

## 2022-03-01 RX ORDER — PANTOPRAZOLE SODIUM 40 MG/1
40 TABLET, DELAYED RELEASE ORAL
Qty: 90 TABLET | Refills: 0 | Status: SHIPPED | OUTPATIENT
Start: 2022-03-01

## 2022-03-02 ENCOUNTER — TELEPHONE (OUTPATIENT)
Dept: INTERNAL MEDICINE CLINIC | Facility: CLINIC | Age: 87
End: 2022-03-02

## 2022-03-02 NOTE — TELEPHONE ENCOUNTER
Dr. Shelly Wilder,    I spoke to patient, these symptoms have been bothering her since July of last year. When taking metamucil her stool is mushy/sticky but formed. She stopped taking the metamucil to see how it would affect her and states has been having mucus without stool. She would like to test for c.diff to see if the infection is still present.

## 2022-03-02 NOTE — TELEPHONE ENCOUNTER
Every time she has to urinate, some mucous comes out. States it is not from bladder. Also comes out when she has a muddy stool. Stopped metamucil today, no stool, but mucous continues to \"ooze\" out. Last night had an incontinent BM. Thinks C-Diff either returned or never went away. What can she do? Does she need to be retested? Please advise. Sending this message also to Dr. Hiram Paz.

## 2022-03-02 NOTE — TELEPHONE ENCOUNTER
Sounds like similar type symptoms as before without c.diff. GI Staff:    Please contact the patient regarding her symptoms. If having diarrhea, then will order a c.diff stool. May simply benefit from a fiber supplement however.     Thanks    Geo Gtz MD  [facility], [facility] - Gastroenterology  3/2/2022  4:31 PM

## 2022-03-03 NOTE — TELEPHONE ENCOUNTER
It sounds like she would benefit from going back on the metamucil. The symptoms she describes are not highly suggestive of recurrent c.diff and testing for clearance without symptoms is not typically recommended by guidelines.  If she is having recurrent diarrhea, then we should certainly repeat her c.diff test.    Thanks    Erick Cottrell MD  [address] [facility] [place] - Gastroenterology  3/3/2022  10:05 AM

## 2022-03-03 NOTE — TELEPHONE ENCOUNTER
Contacted patient and reviewed note below. Patient verbalized understanding. Started back on metamucil today. Still having mucus, no diarrhea. I advised patient to try sticking to this regimen and reach out if worsening or no change.

## 2022-03-10 ENCOUNTER — TELEPHONE (OUTPATIENT)
Dept: INTERNAL MEDICINE CLINIC | Facility: CLINIC | Age: 87
End: 2022-03-10

## 2022-03-10 NOTE — TELEPHONE ENCOUNTER
Week of 4/23 will be discharged from [facility]. Doing well on a nursing standpoint. Has appointment to see PCP this coming Saturday. No further action required at this time.

## 2022-03-12 ENCOUNTER — OFFICE VISIT (OUTPATIENT)
Dept: INTERNAL MEDICINE CLINIC | Facility: CLINIC | Age: 87
End: 2022-03-12
Payer: MEDICARE

## 2022-03-12 VITALS
HEIGHT: 64 IN | HEART RATE: 58 BPM | SYSTOLIC BLOOD PRESSURE: 140 MMHG | DIASTOLIC BLOOD PRESSURE: 60 MMHG | OXYGEN SATURATION: 98 % | BODY MASS INDEX: 36.54 KG/M2 | WEIGHT: 214 LBS

## 2022-03-12 DIAGNOSIS — I10 PRIMARY HYPERTENSION: Primary | ICD-10-CM

## 2022-03-12 DIAGNOSIS — A04.72 COLITIS, CLOSTRIDIUM DIFFICILE: ICD-10-CM

## 2022-03-12 DIAGNOSIS — I87.2 VENOUS INSUFFICIENCY: ICD-10-CM

## 2022-03-12 PROCEDURE — 99214 OFFICE O/P EST MOD 30 MIN: CPT | Performed by: INTERNAL MEDICINE

## 2022-03-12 RX ORDER — PYRIDOXINE HCL (VITAMIN B6) 100 MG
TABLET ORAL
COMMUNITY

## 2022-03-28 ENCOUNTER — NURSE ONLY (OUTPATIENT)
Dept: SURGERY | Facility: CLINIC | Age: 87
End: 2022-03-28
Payer: MEDICARE

## 2022-03-28 DIAGNOSIS — N39.0 RECURRENT UTI: Primary | ICD-10-CM

## 2022-03-28 PROCEDURE — 87086 URINE CULTURE/COLONY COUNT: CPT | Performed by: UROLOGY

## 2022-03-28 NOTE — PROGRESS NOTES
Spoke with patient and states her urine is very cloudy.   Denies any symptoms of UTI, has some frequency but is on a \"water pill\"

## 2022-03-30 ENCOUNTER — TELEPHONE (OUTPATIENT)
Dept: SURGERY | Facility: CLINIC | Age: 87
End: 2022-03-30

## 2022-03-30 NOTE — PROGRESS NOTES
Your recent urine culture shows no infection is normal.  Recommend liberal fluid hydration and check another urine culture in 30 days.   Sincerely,  Nicole Isaacs MD

## 2022-04-25 ENCOUNTER — NURSE ONLY (OUTPATIENT)
Dept: SURGERY | Facility: CLINIC | Age: 87
End: 2022-04-25
Payer: MEDICARE

## 2022-04-25 DIAGNOSIS — N39.41 URGE INCONTINENCE: Primary | ICD-10-CM

## 2022-04-25 DIAGNOSIS — R82.90 URINE FINDING: ICD-10-CM

## 2022-04-29 ENCOUNTER — TELEPHONE (OUTPATIENT)
Dept: SURGERY | Facility: CLINIC | Age: 87
End: 2022-04-29

## 2022-04-29 NOTE — TELEPHONE ENCOUNTER
----- Message from Fredo Lai MD sent at 4/28/2022  5:43 PM CDT -----      Your recent culture showed Aerococcus and is abnormal.  If asymptomatic I would monitor and recommend a repeat urine culture in 30 days.     Sincerely,  Micaela Gabriel MD

## 2022-05-20 ENCOUNTER — TELEPHONE (OUTPATIENT)
Dept: SURGERY | Facility: CLINIC | Age: 87
End: 2022-05-20

## 2022-05-20 DIAGNOSIS — N39.0 RECURRENT UTI: Primary | ICD-10-CM

## 2022-05-20 NOTE — TELEPHONE ENCOUNTER
Spoke with patient. C/o very cloudy and odorous urine, no visible hematuria. Increased frequency this past week. Some mild right sided pelvic discomfort. No pain with urination. Cath UA and culture ordered. Verbalized understanding.

## 2022-05-21 ENCOUNTER — LAB ENCOUNTER (OUTPATIENT)
Dept: LAB | Facility: HOSPITAL | Age: 87
End: 2022-05-21
Attending: UROLOGY
Payer: MEDICARE

## 2022-05-21 DIAGNOSIS — N39.0 RECURRENT UTI: ICD-10-CM

## 2022-05-21 LAB
BILIRUB UR QL STRIP.AUTO: NEGATIVE
COLOR UR AUTO: YELLOW
GLUCOSE UR STRIP.AUTO-MCNC: NEGATIVE MG/DL
KETONES UR STRIP.AUTO-MCNC: NEGATIVE MG/DL
NITRITE UR QL STRIP.AUTO: NEGATIVE
PH UR STRIP.AUTO: 5 [PH] (ref 5–8)
PROT UR STRIP.AUTO-MCNC: 30 MG/DL
RBC #/AREA URNS AUTO: >10 /HPF
SP GR UR STRIP.AUTO: 1.02 (ref 1–1.03)
UROBILINOGEN UR STRIP.AUTO-MCNC: <2 MG/DL
WBC #/AREA URNS AUTO: >50 /HPF
WBC CLUMPS UR QL AUTO: PRESENT /HPF

## 2022-05-21 PROCEDURE — 87086 URINE CULTURE/COLONY COUNT: CPT

## 2022-05-21 PROCEDURE — 81001 URINALYSIS AUTO W/SCOPE: CPT | Performed by: UROLOGY

## 2022-05-23 ENCOUNTER — TELEPHONE (OUTPATIENT)
Dept: SURGERY | Facility: CLINIC | Age: 87
End: 2022-05-23

## 2022-05-23 NOTE — TELEPHONE ENCOUNTER
Patient notified urine culture is negative no infection. Encouraged to continue to stay well hydrated with water and continue cranberry extract supplement. Verbalized understanding.

## 2022-05-25 NOTE — PROGRESS NOTES
Your recent urine culture shows no growth is normal.  Recommend follow up in the office as directed.     Sincerely,  Flakito Gómez MD

## 2022-06-04 ENCOUNTER — LAB ENCOUNTER (OUTPATIENT)
Dept: LAB | Facility: HOSPITAL | Age: 87
End: 2022-06-04
Attending: INTERNAL MEDICINE
Payer: MEDICARE

## 2022-06-04 ENCOUNTER — OFFICE VISIT (OUTPATIENT)
Dept: INTERNAL MEDICINE CLINIC | Facility: CLINIC | Age: 87
End: 2022-06-04
Payer: MEDICARE

## 2022-06-04 VITALS
WEIGHT: 203 LBS | SYSTOLIC BLOOD PRESSURE: 120 MMHG | DIASTOLIC BLOOD PRESSURE: 70 MMHG | HEIGHT: 64 IN | BODY MASS INDEX: 34.66 KG/M2

## 2022-06-04 DIAGNOSIS — I10 PRIMARY HYPERTENSION: ICD-10-CM

## 2022-06-04 DIAGNOSIS — R60.0 LOCALIZED EDEMA: ICD-10-CM

## 2022-06-04 DIAGNOSIS — Z00.00 MEDICARE ANNUAL WELLNESS VISIT, INITIAL: Primary | ICD-10-CM

## 2022-06-04 DIAGNOSIS — R13.19 OTHER DYSPHAGIA: ICD-10-CM

## 2022-06-04 DIAGNOSIS — I87.2 VENOUS INSUFFICIENCY: ICD-10-CM

## 2022-06-04 DIAGNOSIS — I47.1 PSVT (PAROXYSMAL SUPRAVENTRICULAR TACHYCARDIA) (HCC): ICD-10-CM

## 2022-06-04 LAB
ALBUMIN SERPL-MCNC: 3.6 G/DL (ref 3.4–5)
ALBUMIN/GLOB SERPL: 0.8 {RATIO} (ref 1–2)
ALP LIVER SERPL-CCNC: 105 U/L
ALT SERPL-CCNC: 40 U/L
ANION GAP SERPL CALC-SCNC: 3 MMOL/L (ref 0–18)
AST SERPL-CCNC: 46 U/L (ref 15–37)
BASOPHILS # BLD AUTO: 0.05 X10(3) UL (ref 0–0.2)
BASOPHILS NFR BLD AUTO: 0.9 %
BILIRUB SERPL-MCNC: 0.6 MG/DL (ref 0.1–2)
BUN BLD-MCNC: 27 MG/DL (ref 7–18)
BUN/CREAT SERPL: 22.3 (ref 10–20)
CALCIUM BLD-MCNC: 9.5 MG/DL (ref 8.5–10.1)
CHLORIDE SERPL-SCNC: 108 MMOL/L (ref 98–112)
CHOLEST SERPL-MCNC: 160 MG/DL (ref ?–200)
CO2 SERPL-SCNC: 31 MMOL/L (ref 21–32)
CREAT BLD-MCNC: 1.21 MG/DL
DEPRECATED RDW RBC AUTO: 46.3 FL (ref 35.1–46.3)
EOSINOPHIL # BLD AUTO: 0.16 X10(3) UL (ref 0–0.7)
EOSINOPHIL NFR BLD AUTO: 3 %
ERYTHROCYTE [DISTWIDTH] IN BLOOD BY AUTOMATED COUNT: 13.6 % (ref 11–15)
FASTING PATIENT LIPID ANSWER: YES
FASTING STATUS PATIENT QL REPORTED: YES
GLOBULIN PLAS-MCNC: 4.4 G/DL (ref 2.8–4.4)
GLUCOSE BLD-MCNC: 94 MG/DL (ref 70–99)
HCT VFR BLD AUTO: 41.8 %
HDLC SERPL-MCNC: 61 MG/DL (ref 40–59)
HGB BLD-MCNC: 13.2 G/DL
IMM GRANULOCYTES # BLD AUTO: 0.02 X10(3) UL (ref 0–1)
IMM GRANULOCYTES NFR BLD: 0.4 %
LDLC SERPL CALC-MCNC: 84 MG/DL (ref ?–100)
LYMPHOCYTES # BLD AUTO: 2 X10(3) UL (ref 1–4)
LYMPHOCYTES NFR BLD AUTO: 37.2 %
MCH RBC QN AUTO: 29 PG (ref 26–34)
MCHC RBC AUTO-ENTMCNC: 31.6 G/DL (ref 31–37)
MCV RBC AUTO: 91.9 FL
MONOCYTES # BLD AUTO: 0.35 X10(3) UL (ref 0.1–1)
MONOCYTES NFR BLD AUTO: 6.5 %
NEUTROPHILS # BLD AUTO: 2.8 X10 (3) UL (ref 1.5–7.7)
NEUTROPHILS # BLD AUTO: 2.8 X10(3) UL (ref 1.5–7.7)
NEUTROPHILS NFR BLD AUTO: 52 %
NONHDLC SERPL-MCNC: 99 MG/DL (ref ?–130)
OSMOLALITY SERPL CALC.SUM OF ELEC: 299 MOSM/KG (ref 275–295)
PLATELET # BLD AUTO: 190 10(3)UL (ref 150–450)
POTASSIUM SERPL-SCNC: 4.3 MMOL/L (ref 3.5–5.1)
PROT SERPL-MCNC: 8 G/DL (ref 6.4–8.2)
RBC # BLD AUTO: 4.55 X10(6)UL
SODIUM SERPL-SCNC: 142 MMOL/L (ref 136–145)
T4 FREE SERPL-MCNC: 1 NG/DL (ref 0.8–1.7)
TRIGL SERPL-MCNC: 82 MG/DL (ref 30–149)
TSI SER-ACNC: 2.88 MIU/ML (ref 0.36–3.74)
VLDLC SERPL CALC-MCNC: 13 MG/DL (ref 0–30)
WBC # BLD AUTO: 5.4 X10(3) UL (ref 4–11)

## 2022-06-04 PROCEDURE — 84443 ASSAY THYROID STIM HORMONE: CPT | Performed by: INTERNAL MEDICINE

## 2022-06-04 PROCEDURE — 36415 COLL VENOUS BLD VENIPUNCTURE: CPT | Performed by: INTERNAL MEDICINE

## 2022-06-04 PROCEDURE — 85025 COMPLETE CBC W/AUTO DIFF WBC: CPT | Performed by: INTERNAL MEDICINE

## 2022-06-04 PROCEDURE — G0439 PPPS, SUBSEQ VISIT: HCPCS | Performed by: INTERNAL MEDICINE

## 2022-06-04 PROCEDURE — 84439 ASSAY OF FREE THYROXINE: CPT | Performed by: INTERNAL MEDICINE

## 2022-06-04 PROCEDURE — 80061 LIPID PANEL: CPT | Performed by: INTERNAL MEDICINE

## 2022-06-04 PROCEDURE — 80053 COMPREHEN METABOLIC PANEL: CPT | Performed by: INTERNAL MEDICINE

## 2022-06-08 ENCOUNTER — LAB ENCOUNTER (OUTPATIENT)
Dept: LAB | Facility: HOSPITAL | Age: 87
End: 2022-06-08
Attending: INTERNAL MEDICINE
Payer: MEDICARE

## 2022-06-08 DIAGNOSIS — R13.19 OTHER DYSPHAGIA: ICD-10-CM

## 2022-06-08 LAB — SARS-COV-2 RNA RESP QL NAA+PROBE: NOT DETECTED

## 2022-06-11 ENCOUNTER — HOSPITAL ENCOUNTER (OUTPATIENT)
Dept: GENERAL RADIOLOGY | Facility: HOSPITAL | Age: 87
Discharge: HOME | End: 2022-06-11
Attending: INTERNAL MEDICINE
Payer: MEDICARE

## 2022-06-11 DIAGNOSIS — R13.19 OTHER DYSPHAGIA: ICD-10-CM

## 2022-06-11 PROCEDURE — 74246 X-RAY XM UPR GI TRC 2CNTRST: CPT | Performed by: INTERNAL MEDICINE

## 2022-09-22 ENCOUNTER — HOSPITAL ENCOUNTER (OUTPATIENT)
Dept: BONE DENSITY | Facility: HOSPITAL | Age: 87
Discharge: HOME | End: 2022-09-22
Attending: INTERNAL MEDICINE

## 2022-09-22 ENCOUNTER — HOSPITAL ENCOUNTER (OUTPATIENT)
Dept: MAMMOGRAPHY | Facility: HOSPITAL | Age: 87
Discharge: HOME | End: 2022-09-22
Attending: INTERNAL MEDICINE

## 2022-09-22 DIAGNOSIS — Z12.31 SCREENING MAMMOGRAM FOR BREAST CANCER: ICD-10-CM

## 2022-09-22 DIAGNOSIS — M81.8 OSTEOPOROSIS OF DISUSE: ICD-10-CM

## 2022-09-22 PROCEDURE — 77063 BREAST TOMOSYNTHESIS BI: CPT | Performed by: INTERNAL MEDICINE

## 2022-09-22 PROCEDURE — 77080 DXA BONE DENSITY AXIAL: CPT | Performed by: INTERNAL MEDICINE

## 2022-09-22 PROCEDURE — 77067 SCR MAMMO BI INCL CAD: CPT | Performed by: INTERNAL MEDICINE

## 2022-09-28 ENCOUNTER — LAB ENCOUNTER (OUTPATIENT)
Dept: LAB | Facility: HOSPITAL | Age: 87
End: 2022-09-28
Attending: UROLOGY
Payer: MEDICARE

## 2022-09-28 ENCOUNTER — TELEPHONE (OUTPATIENT)
Dept: SURGERY | Facility: CLINIC | Age: 87
End: 2022-09-28

## 2022-09-28 DIAGNOSIS — N39.0 RECURRENT UTI: ICD-10-CM

## 2022-09-28 DIAGNOSIS — N39.0 RECURRENT UTI: Primary | ICD-10-CM

## 2022-09-28 PROCEDURE — 87186 SC STD MICRODIL/AGAR DIL: CPT

## 2022-09-28 PROCEDURE — 87086 URINE CULTURE/COLONY COUNT: CPT

## 2022-09-28 NOTE — TELEPHONE ENCOUNTER
Spoke with patient and complaining of increased urinary frequency and pelvic pressure x 4-5 days. Straight cath urine to be collected by daughter who is a nurse for urine culture.

## 2022-09-30 NOTE — PROGRESS NOTES
Your recent shows no growth at 18 hours. Await 48 hour complete result. Recommend follow up in the office as directed.     Sincerely,  Juan Ng MD

## 2022-10-03 ENCOUNTER — TELEPHONE (OUTPATIENT)
Dept: SURGERY | Facility: CLINIC | Age: 87
End: 2022-10-03

## 2022-10-03 RX ORDER — AMOXICILLIN 500 MG/1
500 TABLET, FILM COATED ORAL 2 TIMES DAILY
Qty: 20 TABLET | Refills: 0 | Status: SHIPPED | OUTPATIENT
Start: 2022-10-03 | End: 2022-10-13

## 2022-10-03 NOTE — TELEPHONE ENCOUNTER
Patient notified of positive urine culture and need for abx. Answered all questions and encouraged her to continue with 40-60 oz of water /day to keep urine clear.  Verbalized understanding;

## 2022-10-03 NOTE — TELEPHONE ENCOUNTER
Positive urine culture, Melisssa can you review for Dr Tunde Ruiz please?        ----- Message from Babatunde Wharton MD sent at 9/30/2022  3:54 PM CDT -----  Your recent shows no growth at 18 hours. Await 48 hour complete result. Recommend follow up in the office as directed.     Sincerely,  Nicole Isaacs MD

## 2022-10-04 NOTE — PROGRESS NOTES
Your recent cathed urine culture is abnormal showing enterococcus. Since this is catheterized I suspect some symptoms. Consider macrobid since it does not  pass in to colon but is absorbed in the small intestine. Let me know    Sincerely,  BREN Brown

## 2022-10-05 ENCOUNTER — TELEPHONE (OUTPATIENT)
Dept: SURGERY | Facility: CLINIC | Age: 87
End: 2022-10-05

## 2022-10-05 NOTE — TELEPHONE ENCOUNTER
Patient notified and is allergic to [address]. JOHNATHAN Osman gave rx for amoxicillin and vancomycin due to C dif history. Patient aware and verbalized understanding.      ----- Message from Gomez Rose MD sent at 10/3/2022  9:34 PM CDT -----  Your recent cathed urine culture is abnormal showing enterococcus. Since this is catheterized I suspect some symptoms. Consider macrobid since it does not  pass in to colon but is absorbed in the small intestine. Let me know    Sincerely,  BREN Brown

## 2022-12-03 ENCOUNTER — OFFICE VISIT (OUTPATIENT)
Dept: INTERNAL MEDICINE CLINIC | Facility: CLINIC | Age: 87
End: 2022-12-03
Payer: MEDICARE

## 2022-12-03 VITALS
SYSTOLIC BLOOD PRESSURE: 140 MMHG | HEIGHT: 64 IN | OXYGEN SATURATION: 97 % | HEART RATE: 55 BPM | DIASTOLIC BLOOD PRESSURE: 70 MMHG | WEIGHT: 217 LBS | BODY MASS INDEX: 37.05 KG/M2

## 2022-12-03 DIAGNOSIS — I10 PRIMARY HYPERTENSION: ICD-10-CM

## 2022-12-03 DIAGNOSIS — R60.0 LOCALIZED EDEMA: Primary | ICD-10-CM

## 2022-12-03 DIAGNOSIS — I87.2 VENOUS INSUFFICIENCY: ICD-10-CM

## 2022-12-03 RX ORDER — FUROSEMIDE 40 MG/1
40 TABLET ORAL 2 TIMES DAILY
Qty: 30 TABLET | Refills: 5 | Status: SHIPPED | OUTPATIENT
Start: 2022-12-03

## 2022-12-19 ENCOUNTER — LAB ENCOUNTER (OUTPATIENT)
Dept: LAB | Age: 87
End: 2022-12-19
Attending: INTERNAL MEDICINE
Payer: MEDICARE

## 2022-12-19 DIAGNOSIS — R60.0 LOCALIZED EDEMA: ICD-10-CM

## 2022-12-19 LAB
ANION GAP SERPL CALC-SCNC: 5 MMOL/L (ref 0–18)
BUN BLD-MCNC: 31 MG/DL (ref 7–18)
BUN/CREAT SERPL: 28.7 (ref 10–20)
CALCIUM BLD-MCNC: 9.3 MG/DL (ref 8.5–10.1)
CHLORIDE SERPL-SCNC: 108 MMOL/L (ref 98–112)
CO2 SERPL-SCNC: 28 MMOL/L (ref 21–32)
CREAT BLD-MCNC: 1.08 MG/DL
FASTING STATUS PATIENT QL REPORTED: YES
GFR SERPLBLD BASED ON 1.73 SQ M-ARVRAT: 49 ML/MIN/1.73M2 (ref 60–?)
GLUCOSE BLD-MCNC: 83 MG/DL (ref 70–99)
OSMOLALITY SERPL CALC.SUM OF ELEC: 298 MOSM/KG (ref 275–295)
POTASSIUM SERPL-SCNC: 4.2 MMOL/L (ref 3.5–5.1)
SODIUM SERPL-SCNC: 141 MMOL/L (ref 136–145)

## 2022-12-19 PROCEDURE — 80048 BASIC METABOLIC PNL TOTAL CA: CPT

## 2022-12-19 PROCEDURE — 36415 COLL VENOUS BLD VENIPUNCTURE: CPT

## 2023-01-08 NOTE — TELEPHONE ENCOUNTER
Noted. Physical Therapy    Visit Type: initial evaluation  Co-treat with: Occupational therapist  Precautions:  Medical precautions:  fall risk; standard precautions.  SBP <180  Lines:     Basic: IV, telemetry, continuous pulse oximetry and external urinary catheter      Lines in chart and on patient reviewed, precautions maintained throughout session.  Safety Measures: bed alarm  SUBJECTIVE  Patient agreed to participate in therapy this date.    Patient is a 38 year old female admitted to [facility] for stroke alert with Lt sided weakness, diplopia, and aphasia. CT head negative. Plan for MRI. Pt received TNKase.  Pt is currently in [facility].  At baseline, patient is Independent with functional mobility tasks using no assistive device.    Patient / Family Goal: return to previous functional status and maximize function    Pain     Location: C/o chest pain but does not rate  RN informed on pain level     OBJECTIVE     Cognitive Status   Level of Consciousness   - lethargic and drowsy  Arousal Alertness   - inconsistent responses to stimuli  Orientation    - Oriented to: person and place  Functional Communication   - Forms of Communication: verbal  Attention Span    - Attention: impaired - attends with cues to redirect   - Attention impairment: fatigue  Following Direction   - follows one step commands with repetition and follows one step commands with increased time    Vitals:  SBP increased to 175 sitting EOB, decreased to 140s once returned to supine. RN aware.     Patient Activity Tolerance: 1 to 1 activity to rest      Range of Motion (ROM)   (degrees unless noted; active unless noted; norms in ( ); negative=lacking to 0, positive=beyond 0)  WFL: LLE, RLE  Comments: PROM WFL    Strength  (out of 5 unless noted, standard test position unless noted)   Comments / Details: Inconsistent muscle strength bilaterally, Lt grossly weaker than Rt throughout session but weakness noted bilaterally. Demos at least 2+/5 knee  extension/flexion strength, not noted to DF/PF Rt ankle. Difficulty bridging in bed with weakness noted bilaterally, unable to clear buttocks with inconsistent attempts       Sitting Balance  (JORDAN = base of support)  Static      - Trial 1 details: contact guard  Forward lean with rounded posture, inconsistent lateral lean with first leaning towards Lt then towards Rt. Neck laterally flexed to the Lt.     Standing Balance  (JORDAN = base of support)  Given lethargic state, inconsistent performance on assessment, standing deferred this session       Sensation/Dermatome Testing:    Reports numbness and feeling of heaviness along entire Lt side  Muscle Tone  LLE: WFL  RLE: WFL      Bed Mobility  - Supine to sit: moderate assist, 2 persons  - Sit to supine: moderate assist, 2 persons  Cues for sequencing, A x 2 at LE and trunk to steady into sitting.   Transfers  Given lethargic state, inconsistent performance on assessment, standing deferred this session       Vision  Patient Reported: double vision / diplopia  Head Position   - head tilt left  Reading Assessments  Unable to read clock on wall   Oculomotor function   - Tracking/Visual Pursuits: impaired, need for multiple refixations (inconsistent eye movements with tracking)    Interventions     Training provided: activity tolerance, balance retraining, bed mobility training and positioning    Skilled input: Verbal instruction/cues, posture correction, facilitation and tactile instruction/cues  Verbal Consent: Writer verbally educated and received verbal consent for hand placement, positioning of patient, and techniques to be performed today from patient          ASSESSMENT     Visit # since seen by PT:  0    Patient presents to physical therapy below baseline functional mobility level of independent. Pt current demos fluctuating and inconsistent presentation with neuro exam. Unable to formally rate MMT due to inconsistencies, demos bilateral LE weakness with LLE globally  weaker than Rt. Noted to have at least 2+/5 Lt knee stregnth, unable to DF/PF Lt ankle. Pt requires mod A x 2 for bed mobility secondary to weakness, once sitting EOB able to maintain with CGA for safety. Pt demos forward lean with rounded posture, inconsistent lateral lean with pt noted to lean initially towards Lt than transitioned to a Rt lean. Difficulty assessing vision with inconsistent responses to visual assessment items. Pt with limited participation in evaluation throughout, lethargic with difficulty keeping eyes open. Given current presentation and level of assist, recommend HOMER. Patient is demonstrating decreased strength, balance deficits, pain, decreased activity tolerance, postural problems, diminished sensation which is limiting the completion of all functional mobility at this time.  Further skilled physical therapy is required to address these limitations in attempt to maximize the patient's independence.   PT's recommendation for discharge is Post-acute facility with therapy needs, Pending functional progress, 24 Hour assist due to the following reasons:  Current level of assist, weakness          Discharge Recommendations  Recommendation for Discharge Location: PT WI: Post-acute facility with therapy needs, Pending functional progress  Recommendation for Discharge Support: PT WI: 24 Hour assist  PT/OT Mobility Equipment for Discharge: TBD      Progress: progressing toward goals and slow progress, minimal participation    • Skilled therapy is required to address these limitations in attempt to maximize the patient's independence.     • Predicted patient presentation: Moderate (evolving) - Patient comorbidities and complexities, as defined above, may have varying impact on steady progress for prescribed plan of care.    Education:   - Present and ready to learn: patient  Education provided during session:  - Results of above outlined education: Needs reinforcement    Patient at End of Session:    Location: in bed  Safety measures: alarm system in place/re-engaged, lines intact and call light within reach  Handoff to: nurse and nurse assistant    PLAN   Suggestions for next session as indicated: Continued evaluation, sitting balance/tolerance with focus on midline, BLE strengthening in closed chain, up to chair with LRD to promote tolerance to upright positioning     PT Frequency: 6-7 x per week      Interventions: balance, bed mobility, functional transfer training, equipment eval/education, patient/family training, strengthening, ROM, neuromuscular re-education, gait training, body mechanics, compensatory technique education and safety education  Agreement to plan and goals: patient agrees with goals and treatment plan        GOALS  Review Date: 1/15/2023  Long Term Goals: (to be met by time of discharge from hospital)  Sit to supine: Patient will complete sit to supine minimal assist.  Supine to sit: Patient will complete supine to sit minimal assist.  Sit to stand: Patient will complete sit to stand transfer with 2-wheeled walker, minimal assist.   Stand to sit: Patient will complete stand to sit transfer with 2-wheeled walker, minimal assist.   Stand pivot: Patient will complete stand pivot transfer with 2-wheeled walker, minimal assist.   Ambulation (even): Patient will ambulate on even surface for 25 feet with 2-wheeled walker, minimal assist.           Therapy procedure time and total treatment time can be found documented on the Time Entry flowsheet

## 2023-03-13 RX ORDER — PANTOPRAZOLE SODIUM 40 MG/1
TABLET, DELAYED RELEASE ORAL
Qty: 90 TABLET | Refills: 0 | Status: SHIPPED | OUTPATIENT
Start: 2023-03-13

## 2023-06-08 ENCOUNTER — TELEPHONE (OUTPATIENT)
Dept: SURGERY | Facility: CLINIC | Age: 88
End: 2023-06-08

## 2023-06-08 DIAGNOSIS — R82.90 URINE FINDING: Primary | ICD-10-CM

## 2023-06-08 NOTE — TELEPHONE ENCOUNTER
RN received a call from daughter, Nikos Dillard. Reports patient complaining of urinary frequency, dysuria, pressure in her pelvic and has hx of UTI. Daughter requesting for lab orders. UA/Cx placed.

## 2023-06-10 ENCOUNTER — LAB ENCOUNTER (OUTPATIENT)
Dept: LAB | Facility: HOSPITAL | Age: 88
End: 2023-06-10
Attending: PHYSICIAN ASSISTANT
Payer: MEDICARE

## 2023-06-10 DIAGNOSIS — R82.90 URINE FINDING: ICD-10-CM

## 2023-06-10 LAB
BILIRUB UR QL: NEGATIVE
GLUCOSE UR-MCNC: NORMAL MG/DL
KETONES UR-MCNC: NEGATIVE MG/DL
LEUKOCYTE ESTERASE UR QL STRIP.AUTO: 500
PH UR: 6.5 [PH] (ref 5–8)
PROT UR-MCNC: 100 MG/DL
RBC #/AREA URNS AUTO: >10 /HPF
SP GR UR STRIP: 1.02 (ref 1–1.03)
UROBILINOGEN UR STRIP-ACNC: NORMAL
WBC #/AREA URNS AUTO: >50 /HPF
WBC CLUMPS UR QL AUTO: PRESENT /HPF

## 2023-06-10 PROCEDURE — 87186 SC STD MICRODIL/AGAR DIL: CPT

## 2023-06-10 PROCEDURE — 87086 URINE CULTURE/COLONY COUNT: CPT

## 2023-06-10 PROCEDURE — 81001 URINALYSIS AUTO W/SCOPE: CPT

## 2023-06-10 PROCEDURE — 87077 CULTURE AEROBIC IDENTIFY: CPT

## 2023-06-12 ENCOUNTER — TELEPHONE (OUTPATIENT)
Dept: SURGERY | Facility: CLINIC | Age: 88
End: 2023-06-12

## 2023-06-12 RX ORDER — VANCOMYCIN HYDROCHLORIDE 125 MG/1
125 CAPSULE ORAL 2 TIMES DAILY
Qty: 20 CAPSULE | Refills: 0 | Status: SHIPPED | OUTPATIENT
Start: 2023-06-12 | End: 2023-06-22

## 2023-06-12 RX ORDER — SULFAMETHOXAZOLE AND TRIMETHOPRIM 800; 160 MG/1; MG/1
1 TABLET ORAL 2 TIMES DAILY
Qty: 14 TABLET | Refills: 0 | Status: SHIPPED | OUTPATIENT
Start: 2023-06-12 | End: 2023-06-19

## 2023-06-12 NOTE — TELEPHONE ENCOUNTER
RN initiated the prior auth for Vancomycin and faxed [place] to [facility]

## 2023-06-16 RX ORDER — PANTOPRAZOLE SODIUM 40 MG/1
TABLET, DELAYED RELEASE ORAL
Qty: 90 TABLET | Refills: 0 | Status: SHIPPED | OUTPATIENT
Start: 2023-06-16

## 2023-06-22 ENCOUNTER — HOSPITAL ENCOUNTER (EMERGENCY)
Facility: HOSPITAL | Age: 88
Discharge: HOME | End: 2023-06-22
Attending: EMERGENCY MEDICINE
Payer: MEDICARE

## 2023-06-22 ENCOUNTER — APPOINTMENT (OUTPATIENT)
Dept: CT IMAGING | Facility: HOSPITAL | Age: 88
End: 2023-06-22
Attending: EMERGENCY MEDICINE
Payer: MEDICARE

## 2023-06-22 ENCOUNTER — TELEPHONE (OUTPATIENT)
Dept: INTERNAL MEDICINE CLINIC | Facility: CLINIC | Age: 88
End: 2023-06-22

## 2023-06-22 ENCOUNTER — TELEPHONE (OUTPATIENT)
Dept: SURGERY | Facility: CLINIC | Age: 88
End: 2023-06-22

## 2023-06-22 VITALS
HEIGHT: 64 IN | SYSTOLIC BLOOD PRESSURE: 159 MMHG | TEMPERATURE: 98 F | BODY MASS INDEX: 36.7 KG/M2 | DIASTOLIC BLOOD PRESSURE: 68 MMHG | HEART RATE: 62 BPM | OXYGEN SATURATION: 99 % | RESPIRATION RATE: 18 BRPM | WEIGHT: 215 LBS

## 2023-06-22 DIAGNOSIS — K92.2 GASTROINTESTINAL HEMORRHAGE, UNSPECIFIED GASTROINTESTINAL HEMORRHAGE TYPE: Primary | ICD-10-CM

## 2023-06-22 LAB
ANION GAP SERPL CALC-SCNC: 5 MMOL/L (ref 0–18)
BASOPHILS # BLD AUTO: 0.07 X10(3) UL (ref 0–0.2)
BASOPHILS NFR BLD AUTO: 1.5 %
BUN BLD-MCNC: 29 MG/DL (ref 7–18)
BUN/CREAT SERPL: 17.6 (ref 10–20)
C DIFF TOX B STL QL: NEGATIVE
CALCIUM BLD-MCNC: 9.2 MG/DL (ref 8.5–10.1)
CHLORIDE SERPL-SCNC: 111 MMOL/L (ref 98–112)
CO2 SERPL-SCNC: 24 MMOL/L (ref 21–32)
CREAT BLD-MCNC: 1.65 MG/DL
DEPRECATED RDW RBC AUTO: 44.5 FL (ref 35.1–46.3)
EOSINOPHIL # BLD AUTO: 0.11 X10(3) UL (ref 0–0.7)
EOSINOPHIL NFR BLD AUTO: 2.4 %
ERYTHROCYTE [DISTWIDTH] IN BLOOD BY AUTOMATED COUNT: 13.1 % (ref 11–15)
GFR SERPLBLD BASED ON 1.73 SQ M-ARVRAT: 30 ML/MIN/1.73M2 (ref 60–?)
GLUCOSE BLD-MCNC: 97 MG/DL (ref 70–99)
HCT VFR BLD AUTO: 41.8 %
HGB BLD-MCNC: 13.5 G/DL
IMM GRANULOCYTES # BLD AUTO: 0 X10(3) UL (ref 0–1)
IMM GRANULOCYTES NFR BLD: 0 %
LYMPHOCYTES # BLD AUTO: 1.71 X10(3) UL (ref 1–4)
LYMPHOCYTES NFR BLD AUTO: 37.2 %
MCH RBC QN AUTO: 29.9 PG (ref 26–34)
MCHC RBC AUTO-ENTMCNC: 32.3 G/DL (ref 31–37)
MCV RBC AUTO: 92.7 FL
MONOCYTES # BLD AUTO: 0.41 X10(3) UL (ref 0.1–1)
MONOCYTES NFR BLD AUTO: 8.9 %
NEUTROPHILS # BLD AUTO: 2.3 X10 (3) UL (ref 1.5–7.7)
NEUTROPHILS # BLD AUTO: 2.3 X10(3) UL (ref 1.5–7.7)
NEUTROPHILS NFR BLD AUTO: 50 %
OSMOLALITY SERPL CALC.SUM OF ELEC: 296 MOSM/KG (ref 275–295)
PLATELET # BLD AUTO: 192 10(3)UL (ref 150–450)
POTASSIUM SERPL-SCNC: 4.1 MMOL/L (ref 3.5–5.1)
RBC # BLD AUTO: 4.51 X10(6)UL
SODIUM SERPL-SCNC: 140 MMOL/L (ref 136–145)
WBC # BLD AUTO: 4.6 X10(3) UL (ref 4–11)

## 2023-06-22 PROCEDURE — 99285 EMERGENCY DEPT VISIT HI MDM: CPT

## 2023-06-22 PROCEDURE — 96361 HYDRATE IV INFUSION ADD-ON: CPT

## 2023-06-22 PROCEDURE — 82272 OCCULT BLD FECES 1-3 TESTS: CPT

## 2023-06-22 PROCEDURE — 87493 C DIFF AMPLIFIED PROBE: CPT | Performed by: EMERGENCY MEDICINE

## 2023-06-22 PROCEDURE — 96360 HYDRATION IV INFUSION INIT: CPT

## 2023-06-22 PROCEDURE — 85025 COMPLETE CBC W/AUTO DIFF WBC: CPT | Performed by: EMERGENCY MEDICINE

## 2023-06-22 PROCEDURE — 74176 CT ABD & PELVIS W/O CONTRAST: CPT | Performed by: EMERGENCY MEDICINE

## 2023-06-22 PROCEDURE — 80048 BASIC METABOLIC PNL TOTAL CA: CPT | Performed by: EMERGENCY MEDICINE

## 2023-06-22 RX ORDER — LOPERAMIDE HYDROCHLORIDE 2 MG/1
2 TABLET ORAL AS NEEDED
Qty: 20 TABLET | Refills: 0 | Status: SHIPPED | OUTPATIENT
Start: 2023-06-22 | End: 2023-07-22

## 2023-06-22 NOTE — ED INITIAL ASSESSMENT (HPI)
Pt presents to ED for diarrhea x 3 days. Pt states this morning she noticed bright red blood with her diarrhea. Pt denies fevers and vomiting. Pt c/o lower abdominal pain. Recently treated with bactrim and vancomycin for UTI. Hx of C.diff.

## 2023-06-22 NOTE — ED QUICK NOTES
Pt to ED tx room 46 via wheelchair from triage. Pt A&O x 4, answering all ?s appropriately. Pt connected to cont ECG, pulse ox & BP. Pt here w/ c/o: Blood in stool. Pt reporting multiple episodes of diarrhea over the past few days, and this AM reports she had blood in stool. Pt denies any clots, not on blood thinners. Pt endorsing LLQ abdominal pain. Pt denies n/v, chest pain, shortness of breath, fevers/chills, dizziness/lightheadedness. Cart in low/locked position, side rails up x 2, call light w/ in reach.

## 2023-06-22 NOTE — TELEPHONE ENCOUNTER
Pt called and has had diarrhea for the past 2 days, and today she is having blood I her stools. She would like Dr. Parvez Acuna to call. Advised pt to go to either Urgent Care or Emergency.   Pt insisting she wants a call back from primary

## 2023-06-22 NOTE — TELEPHONE ENCOUNTER
Patient called and states she finisher her abx and is now having bloody diarrhea. The whole bowl is filled with blood. Tried to reach Dr Alvera Duverney for instruction. Informed her she should do to the ER to be evaluated.  Verbalized understanding and aggred to plan,

## 2023-06-22 NOTE — TELEPHONE ENCOUNTER
Daily Note     Today's date: 2022  Patient name: Irma Paulino  : 1962  MRN: [id number]  Referring provider: Shannon Bahena MD  Dx:   Encounter Diagnosis     ICD-10-CM    1  Pain in both knees, unspecified chronicity  M25 561     M25 562    2  Right hip pain  M25 551    3  Gait abnormality  R26 9                   Subjective: Irma Paulino reports her knees felt good after last session and she was able to go grocery shopping  She reports that the bike at home has been helpful with the stiffness  She notes left sided knee pain today  Objective: See treatment diary below      Assessment: Tolerated treatment well  Reduced left knee pain following posterior tibiofemoral mobilizations, distractions, and STM at medial patella  educated on self distraction at knee with towel  Patient demonstrated fatigue post treatment, exhibited good technique with therapeutic exercises and would benefit from continued PT to progress range of motion, strength, and tolerance for ADLs and independence with HEP  Plan: Continue per plan of care        Precautions: standard   HEP created through [address] [place] [address], access code: [facility]   IE:      3 2 IE   Manuals    Mobs  Inferior patellar mobs, post tibfem grade IV, tibiofemoral distraction off edge of table  Inferior patellar mobs grade IV; end-range extension mobs                       Neuro Re-Ed      Skaters  2x10 5s hold      Step ups  4" 15x belinda                                    Ther Ex      Piriformis stretch    Ed on performance    Mod chaparrita stretch  3x30s ea    Hamstring stretch   2x30s ea    SLR   Flexion 15x belinda     Bike  5 min  5 min     Supine hip ER/IR       Clamshell       Heel slide with quad set   10x5s hold belinda     gastroc stretch   2x30s ea     STS  10x     Mini squat  10x     Gait Training                  Modalities                      Insurance:  AMA/CMS Eval/ Re-eval Auth #/ Referral # Total   Visits  Start date To Dr. Darlene Beasley,     Please see below.  Pt does not wish to speak to office staff and only MD. Expiration date Extension  Visit limitation? PT only or  PT+OT?  Co-Insurance   [facility] 7/6        [employer]  $30 Co-pay                                                         AUTH #:  Date               Visits  Authed:  Used                Remaining

## 2023-06-22 NOTE — ED QUICK NOTES
Pt to/from CT scan via cart accompanied by CT tech w/ out issue. Pt resting on cart in low/locked position, side rails up x 2, call light w/ in reach. Pt denies any new needs/complaints.

## 2023-06-27 ENCOUNTER — PATIENT OUTREACH (OUTPATIENT)
Dept: CASE MANAGEMENT | Age: 88
End: 2023-06-27

## 2023-06-27 ENCOUNTER — TELEPHONE (OUTPATIENT)
Dept: INTERNAL MEDICINE CLINIC | Facility: CLINIC | Age: 88
End: 2023-06-27

## 2023-06-29 ENCOUNTER — OFFICE VISIT (OUTPATIENT)
Dept: INTERNAL MEDICINE CLINIC | Facility: CLINIC | Age: 88
End: 2023-06-29
Payer: MEDICARE

## 2023-06-29 VITALS
DIASTOLIC BLOOD PRESSURE: 60 MMHG | SYSTOLIC BLOOD PRESSURE: 140 MMHG | WEIGHT: 212 LBS | HEIGHT: 64 IN | BODY MASS INDEX: 36.19 KG/M2

## 2023-06-29 DIAGNOSIS — R19.5 LOOSE BOWEL MOVEMENTS: ICD-10-CM

## 2023-06-29 DIAGNOSIS — N30.00 ACUTE CYSTITIS WITHOUT HEMATURIA: Primary | ICD-10-CM

## 2023-06-29 DIAGNOSIS — I10 PRIMARY HYPERTENSION: ICD-10-CM

## 2023-06-29 PROCEDURE — 99214 OFFICE O/P EST MOD 30 MIN: CPT | Performed by: INTERNAL MEDICINE

## 2023-08-05 ENCOUNTER — OFFICE VISIT (OUTPATIENT)
Dept: INTERNAL MEDICINE CLINIC | Facility: CLINIC | Age: 88
End: 2023-08-05
Payer: MEDICARE

## 2023-08-05 VITALS
WEIGHT: 217 LBS | DIASTOLIC BLOOD PRESSURE: 76 MMHG | RESPIRATION RATE: 17 BRPM | HEART RATE: 81 BPM | BODY MASS INDEX: 37.05 KG/M2 | OXYGEN SATURATION: 99 % | HEIGHT: 64 IN | SYSTOLIC BLOOD PRESSURE: 144 MMHG

## 2023-08-05 DIAGNOSIS — I26.09 CHRONIC PULMONARY EMBOLISM WITH ACUTE COR PULMONALE, UNSPECIFIED PULMONARY EMBOLISM TYPE (HCC): ICD-10-CM

## 2023-08-05 DIAGNOSIS — N18.31 STAGE 3A CHRONIC KIDNEY DISEASE (HCC): ICD-10-CM

## 2023-08-05 DIAGNOSIS — I47.1 PSVT (PAROXYSMAL SUPRAVENTRICULAR TACHYCARDIA) (HCC): ICD-10-CM

## 2023-08-05 DIAGNOSIS — N18.4 CHRONIC RENAL DISEASE, STAGE IV (HCC): ICD-10-CM

## 2023-08-05 DIAGNOSIS — I87.2 VENOUS INSUFFICIENCY OF BOTH LOWER EXTREMITIES: ICD-10-CM

## 2023-08-05 DIAGNOSIS — N39.0 RECURRENT UTI: ICD-10-CM

## 2023-08-05 DIAGNOSIS — I27.82 CHRONIC PULMONARY EMBOLISM WITH ACUTE COR PULMONALE, UNSPECIFIED PULMONARY EMBOLISM TYPE (HCC): ICD-10-CM

## 2023-08-05 DIAGNOSIS — Z00.00 MEDICARE ANNUAL WELLNESS VISIT, INITIAL: Primary | ICD-10-CM

## 2023-08-18 RX ORDER — FUROSEMIDE 40 MG/1
40 TABLET ORAL 2 TIMES DAILY
Qty: 30 TABLET | Refills: 5 | Status: SHIPPED | OUTPATIENT
Start: 2023-08-18

## 2023-08-18 RX ORDER — ALENDRONATE SODIUM 70 MG/1
70 TABLET ORAL
Qty: 4 TABLET | Refills: 11 | Status: SHIPPED | OUTPATIENT
Start: 2023-08-18

## 2023-08-18 NOTE — TELEPHONE ENCOUNTER
A refill request was received for:  Requested Prescriptions     Pending Prescriptions Disp Refills    FUROSEMIDE 40 MG Oral Tab [Pharmacy Med Name: FUROSEMIDE 40MG TABLETS] 30 tablet 5     Sig: TAKE 1 TABLET(40 MG) BY MOUTH TWICE DAILY    ALENDRONATE 70 MG Oral Tab [Pharmacy Med Name: ALENDRONATE 70MG TABLETS] 4 tablet 11     Sig: TAKE 1 TABLET(70 MG) BY MOUTH EVERY 7 DAYS     Last refill date:  12/3/22    Last office visit: 8/5/23      Future Appointments   Date Time Provider Art Blanco   2/15/2024 11:30 AM [facility] [address] [FreeTextEntry1] : Did well with labetalol Home blood pressures usually normal 120s systolic.  No adverse effects.  No chest pain shortness of breath palpitations lightheadedness dizziness.\par \par Now on small dose of Lexapro.\par \par Reviewed lab studies mild elevation of fractionated catecholamines.  Normal renin and Himanshu levels negative renal duplex for LUIS ENRIQUE renal cyst noted.

## 2023-08-31 ENCOUNTER — APPOINTMENT (OUTPATIENT)
Dept: GENERAL RADIOLOGY | Facility: HOSPITAL | Age: 88
End: 2023-08-31
Attending: EMERGENCY MEDICINE
Payer: MEDICARE

## 2023-08-31 ENCOUNTER — HOSPITAL ENCOUNTER (EMERGENCY)
Facility: HOSPITAL | Age: 88
Discharge: HOME | End: 2023-08-31
Attending: EMERGENCY MEDICINE
Payer: MEDICARE

## 2023-08-31 ENCOUNTER — APPOINTMENT (OUTPATIENT)
Dept: CT IMAGING | Facility: HOSPITAL | Age: 88
End: 2023-08-31
Attending: EMERGENCY MEDICINE
Payer: MEDICARE

## 2023-08-31 VITALS
SYSTOLIC BLOOD PRESSURE: 158 MMHG | DIASTOLIC BLOOD PRESSURE: 67 MMHG | HEART RATE: 61 BPM | RESPIRATION RATE: 23 BRPM | OXYGEN SATURATION: 99 % | TEMPERATURE: 97 F

## 2023-08-31 DIAGNOSIS — S51.819A SKIN TEAR OF FOREARM WITHOUT COMPLICATION, INITIAL ENCOUNTER: ICD-10-CM
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
## 2023-08-31 PROCEDURE — 99284 EMERGENCY DEPT VISIT MOD MDM: CPT

## 2023-08-31 PROCEDURE — 73090 X-RAY EXAM OF FOREARM: CPT | Performed by: EMERGENCY MEDICINE

## 2023-08-31 PROCEDURE — 90471 IMMUNIZATION ADMIN: CPT

## 2023-08-31 PROCEDURE — 70450 CT HEAD/BRAIN W/O DYE: CPT | Performed by: EMERGENCY MEDICINE

## 2023-08-31 NOTE — ED INITIAL ASSESSMENT (HPI)
Pt to ED via [facility]. Per pt she was walking at home when she tripped and fell. The patient denies hitting herself on the head, denies LOC. Reports blood thinners but unable to state which ones she takes. Laceration noted on the right wrist on posterior side, pt reports it is from a piece of metal from bed.  Pt in no obvious distress A&Ox3

## 2023-09-06 ENCOUNTER — PATIENT OUTREACH (OUTPATIENT)
Dept: CASE MANAGEMENT | Age: 88
End: 2023-09-06

## 2023-09-06 NOTE — PROGRESS NOTES
1st attempt ED f/up apt request   PCP -existing apt (2/3/24) pt doesn't want to move up apt  Closing encounter

## 2023-09-18 RX ORDER — PANTOPRAZOLE SODIUM 40 MG/1
40 TABLET, DELAYED RELEASE ORAL
Qty: 90 TABLET | Refills: 0 | Status: SHIPPED | OUTPATIENT
Start: 2023-09-18

## 2023-10-13 ENCOUNTER — HOSPITAL ENCOUNTER (INPATIENT)
Facility: HOSPITAL | Age: 88
LOS: 2 days | Discharge: HOME HEALTH | DRG: 176 | End: 2023-10-16
Attending: STUDENT IN AN ORGANIZED HEALTH CARE EDUCATION/TRAINING PROGRAM | Admitting: STUDENT IN AN ORGANIZED HEALTH CARE EDUCATION/TRAINING PROGRAM
Payer: MEDICARE

## 2023-10-13 ENCOUNTER — APPOINTMENT (OUTPATIENT)
Dept: GENERAL RADIOLOGY | Facility: HOSPITAL | Age: 88
DRG: 176 | End: 2023-10-13
Attending: STUDENT IN AN ORGANIZED HEALTH CARE EDUCATION/TRAINING PROGRAM
Payer: MEDICARE

## 2023-10-13 DIAGNOSIS — I26.99 ACUTE PULMONARY EMBOLISM WITHOUT ACUTE COR PULMONALE, UNSPECIFIED PULMONARY EMBOLISM TYPE (HCC): Primary | ICD-10-CM

## 2023-10-13 PROCEDURE — 71045 X-RAY EXAM CHEST 1 VIEW: CPT | Performed by: STUDENT IN AN ORGANIZED HEALTH CARE EDUCATION/TRAINING PROGRAM

## 2023-10-14 ENCOUNTER — APPOINTMENT (OUTPATIENT)
Dept: CT IMAGING | Facility: HOSPITAL | Age: 88
DRG: 176 | End: 2023-10-14
Attending: STUDENT IN AN ORGANIZED HEALTH CARE EDUCATION/TRAINING PROGRAM
Payer: MEDICARE

## 2023-10-14 ENCOUNTER — APPOINTMENT (OUTPATIENT)
Dept: CV DIAGNOSTICS | Facility: HOSPITAL | Age: 88
DRG: 176 | End: 2023-10-14
Attending: INTERNAL MEDICINE
Payer: MEDICARE

## 2023-10-14 PROBLEM — I26.99 ACUTE PULMONARY EMBOLISM WITHOUT ACUTE COR PULMONALE, UNSPECIFIED PULMONARY EMBOLISM TYPE (HCC): Status: ACTIVE | Noted: 2023-10-14

## 2023-10-14 LAB
ANION GAP SERPL CALC-SCNC: 5 MMOL/L (ref 0–18)
ANION GAP SERPL CALC-SCNC: 6 MMOL/L (ref 0–18)
APTT PPP: 28.9 SECONDS (ref 23.3–35.6)
APTT PPP: >240 SECONDS (ref 23.3–35.6)
APTT PPP: >240 SECONDS (ref 23.3–35.6)
ATRIAL RATE: 58 BPM
BASOPHILS # BLD AUTO: 0.05 X10(3) UL (ref 0–0.2)
BASOPHILS NFR BLD AUTO: 0.8 %
BUN BLD-MCNC: 26 MG/DL (ref 7–18)
BUN BLD-MCNC: 31 MG/DL (ref 7–18)
BUN/CREAT SERPL: 22.4 (ref 10–20)
BUN/CREAT SERPL: 23 (ref 10–20)
CALCIUM BLD-MCNC: 8.5 MG/DL (ref 8.5–10.1)
CALCIUM BLD-MCNC: 9 MG/DL (ref 8.5–10.1)
CHLORIDE SERPL-SCNC: 108 MMOL/L (ref 98–112)
CHLORIDE SERPL-SCNC: 111 MMOL/L (ref 98–112)
CO2 SERPL-SCNC: 26 MMOL/L (ref 21–32)
CO2 SERPL-SCNC: 28 MMOL/L (ref 21–32)
CREAT BLD-MCNC: 1.16 MG/DL
CREAT BLD-MCNC: 1.35 MG/DL
DEPRECATED RDW RBC AUTO: 42.7 FL (ref 35.1–46.3)
DEPRECATED RDW RBC AUTO: 43.8 FL (ref 35.1–46.3)
EGFRCR SERPLBLD CKD-EPI 2021: 37 ML/MIN/1.73M2 (ref 60–?)
EGFRCR SERPLBLD CKD-EPI 2021: 45 ML/MIN/1.73M2 (ref 60–?)
EOSINOPHIL # BLD AUTO: 0.17 X10(3) UL (ref 0–0.7)
EOSINOPHIL NFR BLD AUTO: 2.8 %
ERYTHROCYTE [DISTWIDTH] IN BLOOD BY AUTOMATED COUNT: 12.7 % (ref 11–15)
ERYTHROCYTE [DISTWIDTH] IN BLOOD BY AUTOMATED COUNT: 12.8 % (ref 11–15)
GLUCOSE BLD-MCNC: 97 MG/DL (ref 70–99)
GLUCOSE BLD-MCNC: 97 MG/DL (ref 70–99)
HCT VFR BLD AUTO: 36.7 %
HCT VFR BLD AUTO: 40.4 %
HGB BLD-MCNC: 11.9 G/DL
HGB BLD-MCNC: 12.7 G/DL
IMM GRANULOCYTES # BLD AUTO: 0 X10(3) UL (ref 0–1)
IMM GRANULOCYTES NFR BLD: 0 %
LYMPHOCYTES # BLD AUTO: 2.95 X10(3) UL (ref 1–4)
LYMPHOCYTES NFR BLD AUTO: 47.9 %
MCH RBC QN AUTO: 29.3 PG (ref 26–34)
MCH RBC QN AUTO: 29.7 PG (ref 26–34)
MCHC RBC AUTO-ENTMCNC: 31.4 G/DL (ref 31–37)
MCHC RBC AUTO-ENTMCNC: 32.4 G/DL (ref 31–37)
MCV RBC AUTO: 91.5 FL
MCV RBC AUTO: 93.3 FL
MONOCYTES # BLD AUTO: 0.52 X10(3) UL (ref 0.1–1)
MONOCYTES NFR BLD AUTO: 8.4 %
NEUTROPHILS # BLD AUTO: 2.47 X10 (3) UL (ref 1.5–7.7)
NEUTROPHILS # BLD AUTO: 2.47 X10(3) UL (ref 1.5–7.7)
NEUTROPHILS NFR BLD AUTO: 40.1 %
NT-PROBNP SERPL-MCNC: 273 PG/ML (ref ?–450)
OSMOLALITY SERPL CALC.SUM OF ELEC: 299 MOSM/KG (ref 275–295)
OSMOLALITY SERPL CALC.SUM OF ELEC: 300 MOSM/KG (ref 275–295)
P AXIS: 40 DEGREES
P-R INTERVAL: 200 MS
PLATELET # BLD AUTO: 169 10(3)UL (ref 150–450)
PLATELET # BLD AUTO: 185 10(3)UL (ref 150–450)
POTASSIUM SERPL-SCNC: 3.7 MMOL/L (ref 3.5–5.1)
POTASSIUM SERPL-SCNC: 4 MMOL/L (ref 3.5–5.1)
Q-T INTERVAL: 458 MS
QRS DURATION: 98 MS
QTC CALCULATION (BEZET): 449 MS
R AXIS: -20 DEGREES
RBC # BLD AUTO: 4.01 X10(6)UL
RBC # BLD AUTO: 4.33 X10(6)UL
SODIUM SERPL-SCNC: 142 MMOL/L (ref 136–145)
SODIUM SERPL-SCNC: 142 MMOL/L (ref 136–145)
T AXIS: 41 DEGREES
TROPONIN I HIGH SENSITIVITY: 7 NG/L
VENTRICULAR RATE: 58 BPM
WBC # BLD AUTO: 5.5 X10(3) UL (ref 4–11)
WBC # BLD AUTO: 6.2 X10(3) UL (ref 4–11)

## 2023-10-14 PROCEDURE — 71260 CT THORAX DX C+: CPT | Performed by: STUDENT IN AN ORGANIZED HEALTH CARE EDUCATION/TRAINING PROGRAM

## 2023-10-14 PROCEDURE — 93306 TTE W/DOPPLER COMPLETE: CPT | Performed by: INTERNAL MEDICINE

## 2023-10-14 RX ORDER — HEPARIN SODIUM 1000 [USP'U]/ML
80 INJECTION, SOLUTION INTRAVENOUS; SUBCUTANEOUS ONCE
Status: COMPLETED | OUTPATIENT
Start: 2023-10-14 | End: 2023-10-14

## 2023-10-14 RX ORDER — HEPARIN SODIUM AND DEXTROSE 10000; 5 [USP'U]/100ML; G/100ML
INJECTION INTRAVENOUS CONTINUOUS
Status: DISCONTINUED | OUTPATIENT
Start: 2023-10-14 | End: 2023-10-14

## 2023-10-14 RX ORDER — HYDROCODONE BITARTRATE AND ACETAMINOPHEN 5; 325 MG/1; MG/1
2 TABLET ORAL EVERY 4 HOURS PRN
Status: DISCONTINUED | OUTPATIENT
Start: 2023-10-14 | End: 2023-10-16

## 2023-10-14 RX ORDER — DILTIAZEM HYDROCHLORIDE 240 MG/1
240 CAPSULE, COATED, EXTENDED RELEASE ORAL DAILY
Status: DISCONTINUED | OUTPATIENT
Start: 2023-10-14 | End: 2023-10-16

## 2023-10-14 RX ORDER — SODIUM CHLORIDE 9 MG/ML
INJECTION, SOLUTION INTRAVENOUS CONTINUOUS
Status: DISCONTINUED | OUTPATIENT
Start: 2023-10-14 | End: 2023-10-15

## 2023-10-14 RX ORDER — FUROSEMIDE 40 MG/1
40 TABLET ORAL 2 TIMES DAILY
Status: DISCONTINUED | OUTPATIENT
Start: 2023-10-14 | End: 2023-10-16

## 2023-10-14 RX ORDER — HEPARIN SODIUM AND DEXTROSE 10000; 5 [USP'U]/100ML; G/100ML
18 INJECTION INTRAVENOUS ONCE
Status: DISCONTINUED | OUTPATIENT
Start: 2023-10-14 | End: 2023-10-14

## 2023-10-14 RX ORDER — PANTOPRAZOLE SODIUM 40 MG/1
40 TABLET, DELAYED RELEASE ORAL
Status: DISCONTINUED | OUTPATIENT
Start: 2023-10-14 | End: 2023-10-16

## 2023-10-14 RX ORDER — ACETAMINOPHEN 325 MG/1
650 TABLET ORAL EVERY 4 HOURS PRN
Status: DISCONTINUED | OUTPATIENT
Start: 2023-10-14 | End: 2023-10-16

## 2023-10-14 RX ORDER — METOPROLOL SUCCINATE 50 MG/1
50 TABLET, EXTENDED RELEASE ORAL DAILY
Status: DISCONTINUED | OUTPATIENT
Start: 2023-10-14 | End: 2023-10-16

## 2023-10-14 RX ORDER — ACETAMINOPHEN 500 MG
500 TABLET ORAL EVERY 4 HOURS PRN
Status: DISCONTINUED | OUTPATIENT
Start: 2023-10-14 | End: 2023-10-16

## 2023-10-14 RX ORDER — ONDANSETRON 2 MG/ML
4 INJECTION INTRAMUSCULAR; INTRAVENOUS EVERY 6 HOURS PRN
Status: DISCONTINUED | OUTPATIENT
Start: 2023-10-14 | End: 2023-10-16

## 2023-10-14 RX ORDER — ALBUTEROL SULFATE 90 UG/1
1-2 AEROSOL, METERED RESPIRATORY (INHALATION) EVERY 6 HOURS PRN
Status: DISCONTINUED | OUTPATIENT
Start: 2023-10-14 | End: 2023-10-16

## 2023-10-14 RX ORDER — HYDROCODONE BITARTRATE AND ACETAMINOPHEN 5; 325 MG/1; MG/1
1 TABLET ORAL EVERY 4 HOURS PRN
Status: DISCONTINUED | OUTPATIENT
Start: 2023-10-14 | End: 2023-10-16

## 2023-10-14 RX ORDER — HYDRALAZINE HYDROCHLORIDE 20 MG/ML
10 INJECTION INTRAMUSCULAR; INTRAVENOUS EVERY 4 HOURS PRN
Status: DISCONTINUED | OUTPATIENT
Start: 2023-10-14 | End: 2023-10-16

## 2023-10-14 RX ORDER — HEPARIN SODIUM AND DEXTROSE 10000; 5 [USP'U]/100ML; G/100ML
INJECTION INTRAVENOUS CONTINUOUS
Status: DISCONTINUED | OUTPATIENT
Start: 2023-10-14 | End: 2023-10-15

## 2023-10-14 RX ORDER — METOCLOPRAMIDE HYDROCHLORIDE 5 MG/ML
5 INJECTION INTRAMUSCULAR; INTRAVENOUS EVERY 8 HOURS PRN
Status: DISCONTINUED | OUTPATIENT
Start: 2023-10-14 | End: 2023-10-16

## 2023-10-14 NOTE — ED INITIAL ASSESSMENT (HPI)
Patient reports intermittent difficulty catching breathing for the last few days, denies CP. Patient also reports L arm tingling for the last few days that is intermittent.  Hx of SVT

## 2023-10-14 NOTE — ED QUICK NOTES
RN x 2 unable to initiate IV and draw blood. Large-bore required for CT PE. Ultrasound IV RN requested to bedside for assistance.

## 2023-10-14 NOTE — PLAN OF CARE
Patient alert and oriented x 4. Vitals stable on room air. Patient denies pain. Patient on heparin drip at 12ml/hr. Plan to continue to follow titration protocol. Call light within reach. Problem: Patient Centered Care  Goal: Patient preferences are identified and integrated in the patient's plan of care  Description: Interventions:  - What would you like us to know as we care for you? From home with daughters  - Provide timely, complete, and accurate information to patient/family  - Incorporate patient and family knowledge, values, beliefs, and cultural backgrounds into the planning and delivery of care  - Encourage patient/family to participate in care and decision-making at the level they choose  - Honor patient and family perspectives and choices  Outcome: Progressing     Problem: Patient/Family Goals  Goal: Patient/Family Long Term Goal  Description: Patient's Long Term Goal: Be able to discharge    Interventions:  - Medication administration  - monitoring labs  - See additional Care Plan goals for specific interventions  Outcome: Progressing  Goal: Patient/Family Short Term Goal  Description: Patient's Short Term Goal: Reduce heparin dose    Interventions:   - Titrate per protocol  - See additional Care Plan goals for specific interventions  Outcome: Progressing     Problem: CARDIOVASCULAR - ADULT  Goal: Maintains optimal cardiac output and hemodynamic stability  Description: INTERVENTIONS:  - Monitor vital signs, rhythm, and trends  - Monitor for bleeding, hypotension and signs of decreased cardiac output  - Evaluate effectiveness of vasoactive medications to optimize hemodynamic stability  - Monitor arterial and/or venous puncture sites for bleeding and/or hematoma  - Assess quality of pulses, skin color and temperature  - Assess for signs of decreased coronary artery perfusion - ex.  Angina  - Evaluate fluid balance, assess for edema, trend weights  Outcome: Progressing  Goal: Absence of cardiac arrhythmias or at baseline  Description: INTERVENTIONS:  - Continuous cardiac monitoring, monitor vital signs, obtain 12 lead EKG if indicated  - Evaluate effectiveness of antiarrhythmic and heart rate control medications as ordered  - Initiate emergency measures for life threatening arrhythmias  - Monitor electrolytes and administer replacement therapy as ordered  Outcome: Progressing     Problem: RESPIRATORY - ADULT  Goal: Achieves optimal ventilation and oxygenation  Description: INTERVENTIONS:  - Assess for changes in respiratory status  - Assess for changes in mentation and behavior  - Position to facilitate oxygenation and minimize respiratory effort  - Oxygen supplementation based on oxygen saturation or ABGs  - Provide Smoking Cessation handout, if applicable  - Encourage broncho-pulmonary hygiene including cough, deep breathe, Incentive Spirometry  - Assess the need for suctioning and perform as needed  - Assess and instruct to report SOB or any respiratory difficulty  - Respiratory Therapy support as indicated  - Manage/alleviate anxiety  - Monitor for signs/symptoms of CO2 retention  Outcome: Progressing     Problem: HEMATOLOGIC - ADULT  Goal: Maintains hematologic stability  Description: INTERVENTIONS  - Assess for signs and symptoms of bleeding or hemorrhage  - Monitor labs and vital signs for trends  - Administer supportive blood products/factors, fluids and medications as ordered and appropriate  - Administer supportive blood products/factors as ordered and appropriate  Outcome: Progressing  Goal: Free from bleeding injury  Description: (Example usage: patient with low platelets)  INTERVENTIONS:  - Avoid intramuscular injections, enemas and rectal medication administration  - Ensure safe mobilization of patient  - Hold pressure on venipuncture sites to achieve adequate hemostasis  - Assess for signs and symptoms of internal bleeding  - Monitor lab trends  - Patient is to report abnormal signs of bleeding to staff  - Avoid use of toothpicks and dental floss  - Use electric shaver for shaving  - Use soft bristle tooth brush  - Limit straining and forceful nose blowing  Outcome: Progressing

## 2023-10-14 NOTE — ED QUICK NOTES
Orders for admission, patient is aware of plan and ready to go upstairs. Any questions, please call ED RN Cecilia Gonzalez at extension [phone]. Patient Covid vaccination status: Fully vaccinated     COVID Test Ordered in ED: None    COVID Suspicion at Admission: N/A    Running Infusions:  heparin gtt    Mental Status/LOC at time of transport:  Aox4    Other pertinent information:   CIWA score: N/A   NIH score:  N/A

## 2023-10-14 NOTE — PLAN OF CARE
Patient is A+O x4. Room air. Up with walker. Limited movement in lower extremities due to swelling. Purewick in place. Heparin drip running at 18 mL/hour (SEE MAR), next PTT draw set for 0920. Plan is for a cardiac Echo today. Problem: Patient Centered Care  Goal: Patient preferences are identified and integrated in the patient's plan of care  Description: Interventions:  - What would you like us to know as we care for you? From home alone. Daughters help her with transportation and at home.     - Provide timely, complete, and accurate information to patient/family  - Incorporate patient and family knowledge, values, beliefs, and cultural backgrounds into the planning and delivery of care  - Encourage patient/family to participate in care and decision-making at the level they choose  - Honor patient and family perspectives and choices  Outcome: Progressing     Problem: Patient/Family Goals  Goal: Patient/Family Long Term Goal  Description: Patient's Long Term Goal: Discharge home    Interventions:  - Monitor vitals  - Physical assessments  - See additional Care Plan goals for specific interventions  Outcome: Progressing  Goal: Patient/Family Short Term Goal  Description: Patient's Short Term Goal: Decreased SOB    Interventions:   - Monitor for bleeding  - Monitor aPTT  - Provide comfort and safety  - O2 if needed  - See additional Care Plan goals for specific interventions  Outcome: Progressing     Problem: CARDIOVASCULAR - ADULT  Goal: Maintains optimal cardiac output and hemodynamic stability  Description: INTERVENTIONS:  - Monitor vital signs, rhythm, and trends  - Monitor for bleeding, hypotension and signs of decreased cardiac output  - Evaluate effectiveness of vasoactive medications to optimize hemodynamic stability  - Monitor arterial and/or venous puncture sites for bleeding and/or hematoma  - Assess quality of pulses, skin color and temperature  - Assess for signs of decreased coronary artery perfusion - ex. Angina  - Evaluate fluid balance, assess for edema, trend weights  Outcome: Progressing  Goal: Absence of cardiac arrhythmias or at baseline  Description: INTERVENTIONS:  - Continuous cardiac monitoring, monitor vital signs, obtain 12 lead EKG if indicated  - Evaluate effectiveness of antiarrhythmic and heart rate control medications as ordered  - Initiate emergency measures for life threatening arrhythmias  - Monitor electrolytes and administer replacement therapy as ordered  Outcome: Progressing     Problem: RESPIRATORY - ADULT  Goal: Achieves optimal ventilation and oxygenation  Description: INTERVENTIONS:  - Assess for changes in respiratory status  - Assess for changes in mentation and behavior  - Position to facilitate oxygenation and minimize respiratory effort  - Oxygen supplementation based on oxygen saturation or ABGs  - Provide Smoking Cessation handout, if applicable  - Encourage broncho-pulmonary hygiene including cough, deep breathe, Incentive Spirometry  - Assess the need for suctioning and perform as needed  - Assess and instruct to report SOB or any respiratory difficulty  - Respiratory Therapy support as indicated  - Manage/alleviate anxiety  - Monitor for signs/symptoms of CO2 retention  Outcome: Progressing     Problem: HEMATOLOGIC - ADULT  Goal: Maintains hematologic stability  Description: INTERVENTIONS  - Assess for signs and symptoms of bleeding or hemorrhage  - Monitor labs and vital signs for trends  - Administer supportive blood products/factors, fluids and medications as ordered and appropriate  - Administer supportive blood products/factors as ordered and appropriate  Outcome: Progressing  Goal: Free from bleeding injury  Description: (Example usage: patient with low platelets)  INTERVENTIONS:  - Avoid intramuscular injections, enemas and rectal medication administration  - Ensure safe mobilization of patient  - Hold pressure on venipuncture sites to achieve adequate hemostasis  - Assess for signs and symptoms of internal bleeding  - Monitor lab trends  - Patient is to report abnormal signs of bleeding to staff  - Avoid use of toothpicks and dental floss  - Use electric shaver for shaving  - Use soft bristle tooth brush  - Limit straining and forceful nose blowing  Outcome: Progressing

## 2023-10-15 LAB
APTT PPP: 229.4 SECONDS (ref 23.3–35.6)
APTT PPP: 73.4 SECONDS (ref 23.3–35.6)
APTT PPP: 96.2 SECONDS (ref 23.3–35.6)
PLATELET # BLD AUTO: 164 10(3)UL (ref 150–450)

## 2023-10-15 PROCEDURE — 99233 SBSQ HOSP IP/OBS HIGH 50: CPT | Performed by: HOSPITALIST

## 2023-10-15 NOTE — PLAN OF CARE
Patient is A+O x4. Redness and irritation noticed under breasts, MD ordered powder (SEE MAR). PTT has not been therapeutic for the last three draws. Per MD, Heparin drip held for two hours. PTT at 0551 was 96.2 in the therapeutic range, MD stated to decrease by 300 units and another PTT in 6 hours. Problem: Patient Centered Care  Goal: Patient preferences are identified and integrated in the patient's plan of care  Description: Interventions:  - What would you like us to know as we care for you?  From home alone, receives help from daughter.   - Provide timely, complete, and accurate information to patient/family  - Incorporate patient and family knowledge, values, beliefs, and cultural backgrounds into the planning and delivery of care  - Encourage patient/family to participate in care and decision-making at the level they choose  - Honor patient and family perspectives and choices  Outcome: Progressing     Problem: Patient/Family Goals  Goal: Patient/Family Long Term Goal  Description: Patient's Long Term Goal: Discharge home    Interventions:  - Monitor vitals  - Physical assessments  - Monitor labs  - See additional Care Plan goals for specific interventions  Outcome: Progressing  Goal: Patient/Family Short Term Goal  Description: Patient's Short Term Goal: Patient is free from abnormal bleeding    Interventions:   - Monitor PTT  - Adjust Heparin Drip per protocol  - Assess patient  - Take vitals  - See additional Care Plan goals for specific interventions  Outcome: Progressing     Problem: CARDIOVASCULAR - ADULT  Goal: Maintains optimal cardiac output and hemodynamic stability  Description: INTERVENTIONS:  - Monitor vital signs, rhythm, and trends  - Monitor for bleeding, hypotension and signs of decreased cardiac output  - Evaluate effectiveness of vasoactive medications to optimize hemodynamic stability  - Monitor arterial and/or venous puncture sites for bleeding and/or hematoma  - Assess quality of pulses, skin color and temperature  - Assess for signs of decreased coronary artery perfusion - ex.  Angina  - Evaluate fluid balance, assess for edema, trend weights  Outcome: Not Progressing  Goal: Absence of cardiac arrhythmias or at baseline  Description: INTERVENTIONS:  - Continuous cardiac monitoring, monitor vital signs, obtain 12 lead EKG if indicated  - Evaluate effectiveness of antiarrhythmic and heart rate control medications as ordered  - Initiate emergency measures for life threatening arrhythmias  - Monitor electrolytes and administer replacement therapy as ordered  Outcome: Progressing     Problem: RESPIRATORY - ADULT  Goal: Achieves optimal ventilation and oxygenation  Description: INTERVENTIONS:  - Assess for changes in respiratory status  - Assess for changes in mentation and behavior  - Position to facilitate oxygenation and minimize respiratory effort  - Oxygen supplementation based on oxygen saturation or ABGs  - Provide Smoking Cessation handout, if applicable  - Encourage broncho-pulmonary hygiene including cough, deep breathe, Incentive Spirometry  - Assess the need for suctioning and perform as needed  - Assess and instruct to report SOB or any respiratory difficulty  - Respiratory Therapy support as indicated  - Manage/alleviate anxiety  - Monitor for signs/symptoms of CO2 retention  Outcome: Progressing     Problem: HEMATOLOGIC - ADULT  Goal: Maintains hematologic stability  Description: INTERVENTIONS  - Assess for signs and symptoms of bleeding or hemorrhage  - Monitor labs and vital signs for trends  - Administer supportive blood products/factors, fluids and medications as ordered and appropriate  - Administer supportive blood products/factors as ordered and appropriate  Outcome: Not Progressing  Goal: Free from bleeding injury  Description: (Example usage: patient with low platelets)  INTERVENTIONS:  - Avoid intramuscular injections, enemas and rectal medication administration  - Ensure safe mobilization of patient  - Hold pressure on venipuncture sites to achieve adequate hemostasis  - Assess for signs and symptoms of internal bleeding  - Monitor lab trends  - Patient is to report abnormal signs of bleeding to staff  - Avoid use of toothpicks and dental floss  - Use electric shaver for shaving  - Use soft bristle tooth brush  - Limit straining and forceful nose blowing  Outcome: Progressing

## 2023-10-15 NOTE — PLAN OF CARE
Pt Aox4. RA. 1 assist with walker. Pt denies chest pain or sob. BLE edema. On heparin drip-therapeutic x2. Pt ambulating in hallway with RN today. All needs met. Bed low and locked. Call light within reach. Will continue to monitor. Update;   Heparin drip discontinued; transitioned to eliquis 10 mg BID. Problem: Patient Centered Care  Goal: Patient preferences are identified and integrated in the patient's plan of care  Description: Interventions:  - What would you like us to know as we care for you? From home alone  - Provide timely, complete, and accurate information to patient/family  - Incorporate patient and family knowledge, values, beliefs, and cultural backgrounds into the planning and delivery of care  - Encourage patient/family to participate in care and decision-making at the level they choose  - Honor patient and family perspectives and choices  Outcome: Progressing     Problem: CARDIOVASCULAR - ADULT  Goal: Maintains optimal cardiac output and hemodynamic stability  Description: INTERVENTIONS:  - Monitor vital signs, rhythm, and trends  - Monitor for bleeding, hypotension and signs of decreased cardiac output  - Evaluate effectiveness of vasoactive medications to optimize hemodynamic stability  - Monitor arterial and/or venous puncture sites for bleeding and/or hematoma  - Assess quality of pulses, skin color and temperature  - Assess for signs of decreased coronary artery perfusion - ex.  Angina  - Evaluate fluid balance, assess for edema, trend weights  Outcome: Progressing  Goal: Absence of cardiac arrhythmias or at baseline  Description: INTERVENTIONS:  - Continuous cardiac monitoring, monitor vital signs, obtain 12 lead EKG if indicated  - Evaluate effectiveness of antiarrhythmic and heart rate control medications as ordered  - Initiate emergency measures for life threatening arrhythmias  - Monitor electrolytes and administer replacement therapy as ordered  Outcome: Progressing     Problem: RESPIRATORY - ADULT  Goal: Achieves optimal ventilation and oxygenation  Description: INTERVENTIONS:  - Assess for changes in respiratory status  - Assess for changes in mentation and behavior  - Position to facilitate oxygenation and minimize respiratory effort  - Oxygen supplementation based on oxygen saturation or ABGs  - Provide Smoking Cessation handout, if applicable  - Encourage broncho-pulmonary hygiene including cough, deep breathe, Incentive Spirometry  - Assess the need for suctioning and perform as needed  - Assess and instruct to report SOB or any respiratory difficulty  - Respiratory Therapy support as indicated  - Manage/alleviate anxiety  - Monitor for signs/symptoms of CO2 retention  Outcome: Progressing     Problem: HEMATOLOGIC - ADULT  Goal: Maintains hematologic stability  Description: INTERVENTIONS  - Assess for signs and symptoms of bleeding or hemorrhage  - Monitor labs and vital signs for trends  - Administer supportive blood products/factors, fluids and medications as ordered and appropriate  - Administer supportive blood products/factors as ordered and appropriate  Outcome: Progressing  Goal: Free from bleeding injury  Description: (Example usage: patient with low platelets)  INTERVENTIONS:  - Avoid intramuscular injections, enemas and rectal medication administration  - Ensure safe mobilization of patient  - Hold pressure on venipuncture sites to achieve adequate hemostasis  - Assess for signs and symptoms of internal bleeding  - Monitor lab trends  - Patient is to report abnormal signs of bleeding to staff  - Avoid use of toothpicks and dental floss  - Use electric shaver for shaving  - Use soft bristle tooth brush  - Limit straining and forceful nose blowing  Outcome: Progressing

## 2023-10-16 ENCOUNTER — APPOINTMENT (OUTPATIENT)
Dept: ULTRASOUND IMAGING | Facility: HOSPITAL | Age: 88
DRG: 176 | End: 2023-10-16
Attending: HOSPITALIST
Payer: MEDICARE

## 2023-10-16 VITALS
HEIGHT: 64 IN | WEIGHT: 206.38 LBS | DIASTOLIC BLOOD PRESSURE: 62 MMHG | SYSTOLIC BLOOD PRESSURE: 142 MMHG | RESPIRATION RATE: 16 BRPM | HEART RATE: 65 BPM | OXYGEN SATURATION: 95 % | TEMPERATURE: 98 F | BODY MASS INDEX: 35.23 KG/M2

## 2023-10-16 LAB
ALBUMIN SERPL-MCNC: 3.1 G/DL (ref 3.4–5)
ANION GAP SERPL CALC-SCNC: 8 MMOL/L (ref 0–18)
APTT PPP: 31.6 SECONDS (ref 23.3–35.6)
BASOPHILS # BLD AUTO: 0.05 X10(3) UL (ref 0–0.2)
BASOPHILS NFR BLD AUTO: 1 %
BUN BLD-MCNC: 30 MG/DL (ref 7–18)
BUN/CREAT SERPL: 22.4 (ref 10–20)
CALCIUM BLD-MCNC: 8.6 MG/DL (ref 8.5–10.1)
CHLORIDE SERPL-SCNC: 109 MMOL/L (ref 98–112)
CO2 SERPL-SCNC: 28 MMOL/L (ref 21–32)
CREAT BLD-MCNC: 1.34 MG/DL
DEPRECATED RDW RBC AUTO: 43.2 FL (ref 35.1–46.3)
EGFRCR SERPLBLD CKD-EPI 2021: 38 ML/MIN/1.73M2 (ref 60–?)
EOSINOPHIL # BLD AUTO: 0.15 X10(3) UL (ref 0–0.7)
EOSINOPHIL NFR BLD AUTO: 2.9 %
ERYTHROCYTE [DISTWIDTH] IN BLOOD BY AUTOMATED COUNT: 12.8 % (ref 11–15)
GLUCOSE BLD-MCNC: 102 MG/DL (ref 70–99)
HCT VFR BLD AUTO: 37.1 %
HGB BLD-MCNC: 11.9 G/DL
IMM GRANULOCYTES # BLD AUTO: 0.02 X10(3) UL (ref 0–1)
IMM GRANULOCYTES NFR BLD: 0.4 %
LYMPHOCYTES # BLD AUTO: 2.52 X10(3) UL (ref 1–4)
LYMPHOCYTES NFR BLD AUTO: 48.6 %
MAGNESIUM SERPL-MCNC: 2.3 MG/DL (ref 1.6–2.6)
MCH RBC QN AUTO: 29.3 PG (ref 26–34)
MCHC RBC AUTO-ENTMCNC: 32.1 G/DL (ref 31–37)
MCV RBC AUTO: 91.4 FL
MONOCYTES # BLD AUTO: 0.5 X10(3) UL (ref 0.1–1)
MONOCYTES NFR BLD AUTO: 9.6 %
NEUTROPHILS # BLD AUTO: 1.95 X10 (3) UL (ref 1.5–7.7)
NEUTROPHILS # BLD AUTO: 1.95 X10(3) UL (ref 1.5–7.7)
NEUTROPHILS NFR BLD AUTO: 37.5 %
NT-PROBNP SERPL-MCNC: 159 PG/ML (ref ?–450)
OSMOLALITY SERPL CALC.SUM OF ELEC: 306 MOSM/KG (ref 275–295)
PHOSPHATE SERPL-MCNC: 3.4 MG/DL (ref 2.5–4.9)
PLATELET # BLD AUTO: 165 10(3)UL (ref 150–450)
PLATELET # BLD AUTO: 165 10(3)UL (ref 150–450)
POTASSIUM SERPL-SCNC: 3.4 MMOL/L (ref 3.5–5.1)
RBC # BLD AUTO: 4.06 X10(6)UL
SODIUM SERPL-SCNC: 145 MMOL/L (ref 136–145)
VIT B12 SERPL-MCNC: 281 PG/ML (ref 193–986)
WBC # BLD AUTO: 5.2 X10(3) UL (ref 4–11)

## 2023-10-16 PROCEDURE — 93970 EXTREMITY STUDY: CPT | Performed by: HOSPITALIST

## 2023-10-16 RX ORDER — POTASSIUM CHLORIDE 20 MEQ/1
20 TABLET, EXTENDED RELEASE ORAL DAILY
Qty: 30 TABLET | Refills: 0 | Status: SHIPPED | OUTPATIENT
Start: 2023-10-16

## 2023-10-16 RX ORDER — POTASSIUM CHLORIDE 20 MEQ/1
40 TABLET, EXTENDED RELEASE ORAL EVERY 4 HOURS
Status: COMPLETED | OUTPATIENT
Start: 2023-10-16 | End: 2023-10-16

## 2023-10-16 NOTE — PHYSICAL THERAPY NOTE
PHYSICAL THERAPY EVALUATION - INPATIENT     Room Number: 336/336-A  Evaluation Date: 10/16/2023  Type of Evaluation: Initial   Physician Order: PT Eval and Treat    Presenting Problem: acute PE anticoagulated  Co-Morbidities : BMI  Reason for Therapy: Mobility Dysfunction and Discharge Planning    PHYSICAL THERAPY ASSESSMENT     Patient is a 80year old female admitted 10/13/2023 for acute PE. Patient's current functional deficits include bed mobility, tranfers, and gait, which are below the patient's pre-admission status. Patient lives home alone, is independent with RW for functional tasks at home. Functional Mobility: Gait belt used throughout mobility. - bed mobility: supine to/from sit CGA   - transfers: sit to/from stand transfers with SBA from EOB and commode   - standing balance tasks with SBA at RW   - gait: ambulation with  with RW      At end of session patient in chair with all needs in reach, RN aware. The patient's Approx Degree of Impairment: 46.58% has been calculated based on documentation in the [facility] '6 clicks' Inpatient Basic Mobility Short Form. Research supports that patients with this level of impairment may benefit from home with home care. Patient will benefit from continued IP PT services to address these deficits in preparation for discharge. DISCHARGE RECOMMENDATIONS  PT Discharge Recommendations: Home with home health PT; Intermittent Supervision    PLAN  PT Treatment Plan: Bed mobility; Body mechanics; Don/doff brace; Endurance; Energy conservation;Patient education;Gait training;Neuromuscular re-educate;Range of motion;Stair training;Stoop training;Transfer training;Balance training  Rehab Potential : Good  Frequency (Obs): 5x/week       PHYSICAL THERAPY MEDICAL/SOCIAL HISTORY     History related to current admission: acute PE      Problem List  Principal Problem:    Acute pulmonary embolism without acute cor pulmonale, unspecified pulmonary embolism type [facility])      HOME SITUATION  Home Situation  Type of Home: House  Home Layout: One level  Stairs to Enter :  (small curb entry)  Stairs to Bedroom: 0  Lives With: Alone  Drives: No  Patient Owned Equipment: Rolling walker     Prior Level of [place]: independent     SUBJECTIVE  \"I should be fine at home. \"     PHYSICAL THERAPY EXAMINATION     OBJECTIVE  Precautions: Bed/chair alarm  Fall Risk: High fall risk    WEIGHT BEARING RESTRICTION      No restrictions           PAIN ASSESSMENT     Location: body aches       COGNITION  Overall Cognitive Status:  WFL - within functional limits    BALANCE  Static Sitting: Good  Dynamic Sitting: Fair +  Static Standing: Fair  Dynamic Standing: Fair -    ACTIVITY TOLERANCE  Pulse: 70  Heart Rate Source: Monitor                   O2 WALK  Oxygen Therapy  SPO2% on Room Air at Rest: 99    AM-PAC '6-Clicks' INPATIENT SHORT FORM - BASIC MOBILITY  How much difficulty does the patient currently have. .. Patient Difficulty: Turning over in bed (including adjusting bedclothes, sheets and blankets)?: A Little   Patient Difficulty: Sitting down on and standing up from a chair with arms (e.g., wheelchair, bedside commode, etc.): A Little   Patient Difficulty: Moving from lying on back to sitting on the side of the bed?: A Little   How much help from another person does the patient currently need. ..    Help from Another: Moving to and from a bed to a chair (including a wheelchair)?: A Little   Help from Another: Need to walk in hospital room?: A Little   Help from Another: Climbing 3-5 steps with a railing?: A Little     AM-PAC Score:  Raw Score: 18   Approx Degree of Impairment: 46.58%   Standardized Score (AM-PAC Scale): 43.63   CMS Modifier (G-Code): CK    FUNCTIONAL ABILITY STATUS  Functional Mobility/Gait Assessment  Gait Assistance: Contact guard assist (SBA)  Distance (ft): 250  Assistive Device: Rolling walker  Pattern: Shuffle    Bed Mobility: SBA    Transfers: SBA    Exercise/Education Provided:  Bed mobility  Body mechanics  Functional activity tolerated  Gait training  Transfer training    Patient End of Session: Up in chair;Needs met;Call light within reach;RN aware of session/findings; All patient questions and concerns addressed; Ice applied    CURRENT GOALS    Goals to be met by: 11/5  Patient Goal Patient's self-stated goal is: to go home    Goal #1 Patient is able to demonstrate supine - sit EOB @ level: independent     Goal #1   Current Status    Goal #2 Patient is able to demonstrate transfers Sit to/from Stand at assistance level: independent with walker - rolling     Goal #2  Current Status    Goal #3 Patient is able to ambulate 500 feet with assist device: walker - rolling at assistance level: supervision   Goal #3   Current Status    Goal #4 Patient will negotiate 1 stairs/one curb w/ assistive device and supervision   Goal #4   Current Status    Goal #5 Patient to demonstrate independence with home activity/exercise instructions provided to patient in preparation for discharge.    Goal #5   Current Status    Goal #6    Goal #6  Current Status      Patient Evaluation Complexity Level:  History Moderate - 1 or 2 personal factors and/or co-morbidities   Examination of body systems Moderate - addressing a total of 3 or more elements   Clinical Presentation Moderate - Evolving   Clinical Decision Making Moderate Complexity       Gait Training: 15 minutes  Therapeutic Activity: 8 minutes

## 2023-10-16 NOTE — CM/SW NOTE
10/16/23 1300   CM/SW Referral Data   Referral Source Physician   Reason for Referral Discharge planning   Informant Patient   Medical Hx   Does patient have an established PCP? Yes  [email or web address])   Patient Info   Patient's Current Mental Status at Time of Assessment Alert;Oriented   Patient's [address]   Number of Levels in Home 1   Number of Stair in Home 1   Patient lives with Alone   Patient Status Prior to Admission   Independent with ADLs and Mobility No   Pt. requires assistance with Ambulating;Driving   Services in place prior to admission DME/Supplies at home   Type of DME/Supplies Standard Walker   Discharge Needs   Anticipated D/C needs Home health care   Choice of Post-Acute Provider   Informed patient of right to choose their preferred provider Yes   List of appropriate post-acute services provided to patient/family with quality data Yes   Patient/family choice [facility]   Information given to Patient   [facility] financial disclosure given Yes       Received MDO [place] Planning: [facility] services for blood/lab draws. HH referral sent in [address] [place] [address]. F2F entered/sent. Met w/ pt at bedside. Above assessment completed. Pt is agreeable to [facility] at [place]. Pt stating she has used [facility] in the past. [facility] list of quality data provided. Pt has chosen [facility].       PLAN: Home w/ [facility]          [address] [facility]

## 2023-10-16 NOTE — CM/SW NOTE
10/16/23 1300   Discharge disposition   Expected discharge disposition Home-Health   Post Acute Care Provider [facility]   Discharge transportation Private car       Per chart, pt has DC order for today. Josh Soulier w/ [facility] notified. Pt is cleared from SW/CM stand point.        PLAN: Home w/ [facility]        [address] [facility]

## 2023-10-16 NOTE — PLAN OF CARE
Bed locked in low position, belongings in reach, bed alarm on, call light in reach. Frequent rounding done, all patient needs met. Non-slip socks on/at bedside. Eliquis started tonight. MD ordered US BLE w/doppler for this morning. Patient hoping to go home today. Had earlier episode of LT arm pain/numbness, pain resolved with tylenol. Problem: CARDIOVASCULAR - ADULT  Goal: Maintains optimal cardiac output and hemodynamic stability  Description: INTERVENTIONS:  - Monitor vital signs, rhythm, and trends  - Monitor for bleeding, hypotension and signs of decreased cardiac output  - Evaluate effectiveness of vasoactive medications to optimize hemodynamic stability  - Monitor arterial and/or venous puncture sites for bleeding and/or hematoma  - Assess quality of pulses, skin color and temperature  - Assess for signs of decreased coronary artery perfusion - ex.  Angina  - Evaluate fluid balance, assess for edema, trend weights  Outcome: Progressing  Goal: Absence of cardiac arrhythmias or at baseline  Description: INTERVENTIONS:  - Continuous cardiac monitoring, monitor vital signs, obtain 12 lead EKG if indicated  - Evaluate effectiveness of antiarrhythmic and heart rate control medications as ordered  - Initiate emergency measures for life threatening arrhythmias  - Monitor electrolytes and administer replacement therapy as ordered  Outcome: Progressing     Problem: RESPIRATORY - ADULT  Goal: Achieves optimal ventilation and oxygenation  Description: INTERVENTIONS:  - Assess for changes in respiratory status  - Assess for changes in mentation and behavior  - Position to facilitate oxygenation and minimize respiratory effort  - Oxygen supplementation based on oxygen saturation or ABGs  - Provide Smoking Cessation handout, if applicable  - Encourage broncho-pulmonary hygiene including cough, deep breathe, Incentive Spirometry  - Assess the need for suctioning and perform as needed  - Assess and instruct to report SOB or any respiratory difficulty  - Respiratory Therapy support as indicated  - Manage/alleviate anxiety  - Monitor for signs/symptoms of CO2 retention  Outcome: Progressing     Problem: HEMATOLOGIC - ADULT  Goal: Maintains hematologic stability  Description: INTERVENTIONS  - Assess for signs and symptoms of bleeding or hemorrhage  - Monitor labs and vital signs for trends  - Administer supportive blood products/factors, fluids and medications as ordered and appropriate  - Administer supportive blood products/factors as ordered and appropriate  Outcome: Progressing  Goal: Free from bleeding injury  Description: (Example usage: patient with low platelets)  INTERVENTIONS:  - Avoid intramuscular injections, enemas and rectal medication administration  - Ensure safe mobilization of patient  - Hold pressure on venipuncture sites to achieve adequate hemostasis  - Assess for signs and symptoms of internal bleeding  - Monitor lab trends  - Patient is to report abnormal signs of bleeding to staff  - Avoid use of toothpicks and dental floss  - Use electric shaver for shaving  - Use soft bristle tooth brush  - Limit straining and forceful nose blowing  Outcome: Progressing

## 2023-10-16 NOTE — DISCHARGE PLANNING
This RN called patient's pharmacy: [facility] [address] AT [address], [phone], [phone], for price check on Eliquis 5mg -30 day supply. Cost  $300 through insurance due to deductible. Prescription available at pharmacy. Eliquis 30 day coupon given to patient with discharge instructions. Patient and Daughter aware and agreeable.

## 2023-10-16 NOTE — BH PROGRESS NOTE
Referral received from YOAN/CHERISE team via [employer]. Discussed with patient the recommendation for home health services, patient agreeable, and all questions answered. Updated YOAN Adames.

## 2023-10-16 NOTE — DISCHARGE PLANNING
Patient was provided with discharge instructions, education, and follow up information. Patient's Daughter present for discharge instructions with patient's consent. Prescriptions were already sent electronically to patient's pharmacy. Patient verbalizes understanding of follow up information, specifically signs and symptoms of bleeding and following up with Dr. Daniel Taylor. Patient has no questions after reviewing all instructions and will be going Home with Purvi Conrad RN, Discharge Leader [id number]

## 2023-10-16 NOTE — DISCHARGE INSTRUCTIONS
RECHECK YOUR BLOOD TEST IN 1 WEEK (HOME HEALTH)    TAKE VIT B12 DAILY    Sometimes managing your health at home requires assistance. The [place] team has recognized your preference to use [facility]. They can be reached by phone at [phone]. The fax number for your reference is [phone]. A representative from the home health agency will contact you or your family to schedule your first visit.

## 2023-10-16 NOTE — PLAN OF CARE
Pt Aox4. RA. SBA with walker. Denies chest pain or sob. On eliquis BID. PT today; PT rec home with home health. Pt agrees with plan of care. Discharge orders received. Discharge instructions done by discharge nurse with pt and daughter at bedside. All questions answered. No further needs. Problem: Patient Centered Care  Goal: Patient preferences are identified and integrated in the patient's plan of care  Description: Interventions:  - What would you like us to know as we care for you? Pt   - Provide timely, complete, and accurate information to patient/family  - Incorporate patient and family knowledge, values, beliefs, and cultural backgrounds into the planning and delivery of care  - Encourage patient/family to participate in care and decision-making at the level they choose  - Honor patient and family perspectives and choices  Outcome: Adequate for Discharge     Problem: CARDIOVASCULAR - ADULT  Goal: Maintains optimal cardiac output and hemodynamic stability  Description: INTERVENTIONS:  - Monitor vital signs, rhythm, and trends  - Monitor for bleeding, hypotension and signs of decreased cardiac output  - Evaluate effectiveness of vasoactive medications to optimize hemodynamic stability  - Monitor arterial and/or venous puncture sites for bleeding and/or hematoma  - Assess quality of pulses, skin color and temperature  - Assess for signs of decreased coronary artery perfusion - ex.  Angina  - Evaluate fluid balance, assess for edema, trend weights  Outcome: Adequate for Discharge  Goal: Absence of cardiac arrhythmias or at baseline  Description: INTERVENTIONS:  - Continuous cardiac monitoring, monitor vital signs, obtain 12 lead EKG if indicated  - Evaluate effectiveness of antiarrhythmic and heart rate control medications as ordered  - Initiate emergency measures for life threatening arrhythmias  - Monitor electrolytes and administer replacement therapy as ordered  Outcome: Adequate for Discharge Problem: RESPIRATORY - ADULT  Goal: Achieves optimal ventilation and oxygenation  Description: INTERVENTIONS:  - Assess for changes in respiratory status  - Assess for changes in mentation and behavior  - Position to facilitate oxygenation and minimize respiratory effort  - Oxygen supplementation based on oxygen saturation or ABGs  - Provide Smoking Cessation handout, if applicable  - Encourage broncho-pulmonary hygiene including cough, deep breathe, Incentive Spirometry  - Assess the need for suctioning and perform as needed  - Assess and instruct to report SOB or any respiratory difficulty  - Respiratory Therapy support as indicated  - Manage/alleviate anxiety  - Monitor for signs/symptoms of CO2 retention  Outcome: Adequate for Discharge     Problem: HEMATOLOGIC - ADULT  Goal: Maintains hematologic stability  Description: INTERVENTIONS  - Assess for signs and symptoms of bleeding or hemorrhage  - Monitor labs and vital signs for trends  - Administer supportive blood products/factors, fluids and medications as ordered and appropriate  - Administer supportive blood products/factors as ordered and appropriate  Outcome: Adequate for Discharge  Goal: Free from bleeding injury  Description: (Example usage: patient with low platelets)  INTERVENTIONS:  - Avoid intramuscular injections, enemas and rectal medication administration  - Ensure safe mobilization of patient  - Hold pressure on venipuncture sites to achieve adequate hemostasis  - Assess for signs and symptoms of internal bleeding  - Monitor lab trends  - Patient is to report abnormal signs of bleeding to staff  - Avoid use of toothpicks and dental floss  - Use electric shaver for shaving  - Use soft bristle tooth brush  - Limit straining and forceful nose blowing  Outcome: Adequate for Discharge

## 2023-10-17 ENCOUNTER — TELEPHONE (OUTPATIENT)
Dept: INTERNAL MEDICINE CLINIC | Facility: CLINIC | Age: 88
End: 2023-10-17

## 2023-10-17 ENCOUNTER — TELEPHONE (OUTPATIENT)
Dept: HEMATOLOGY/ONCOLOGY | Facility: HOSPITAL | Age: 88
End: 2023-10-17

## 2023-10-17 NOTE — TELEPHONE ENCOUNTER
[facility]  [facility] is calling in  to advise patient will be having OT, PT, and a nurse.

## 2023-10-17 NOTE — TELEPHONE ENCOUNTER
Lyudmila Vergara received a call from [facility] her mom was a recent inpt with acute PE and was told to f/u with Dr Grace Deleon.  Please advise if she would like to see patient or give to other dr. Tuyet Lin you Lyudmila Vergara

## 2023-10-18 ENCOUNTER — TELEPHONE (OUTPATIENT)
Dept: INTERNAL MEDICINE CLINIC | Facility: CLINIC | Age: 88
End: 2023-10-18

## 2023-10-18 ENCOUNTER — PATIENT OUTREACH (OUTPATIENT)
Dept: CASE MANAGEMENT | Age: 88
End: 2023-10-18

## 2023-10-18 DIAGNOSIS — Z02.9 ENCOUNTERS FOR UNSPECIFIED ADMINISTRATIVE PURPOSE: Primary | ICD-10-CM

## 2023-10-18 DIAGNOSIS — I26.99 ACUTE PULMONARY EMBOLISM WITHOUT ACUTE COR PULMONALE, UNSPECIFIED PULMONARY EMBOLISM TYPE (HCC): ICD-10-CM

## 2023-10-18 PROCEDURE — 1111F DSCHRG MED/CURRENT MED MERGE: CPT

## 2023-10-18 NOTE — TELEPHONE ENCOUNTER
Spoke to pt for TCM today. Pt has an appointment which his outside of the TCM timeframe. TCM appt recommended by 10/23/2023 as pt is at High risk for readmission. Please advise. Patient has a TCM appointment on 10/31/2023  at 2:00 pm, which is outside of the TCM timeframe, message sent to MD's office with a request to call patient's daughter Bridget Morley  [phone] to reschedule sooner within the TCM timeframe. BOOK BY DATE (last date for TCM): 10/30/2023    Clinical staff:  Please discuss above with Dr Mary Jane Selby if a sooner time is available to schedule the TCM appointment within the TCM timeframe. Then please call patient's daughter Bridget Morley at  [phone] to reschedule sooner. Thank you!

## 2023-10-31 ENCOUNTER — OFFICE VISIT (OUTPATIENT)
Dept: INTERNAL MEDICINE CLINIC | Facility: CLINIC | Age: 88
End: 2023-10-31
Payer: MEDICARE

## 2023-10-31 ENCOUNTER — OFFICE VISIT (OUTPATIENT)
Dept: HEMATOLOGY/ONCOLOGY | Facility: HOSPITAL | Age: 88
End: 2023-10-31
Attending: INTERNAL MEDICINE

## 2023-10-31 VITALS
HEIGHT: 63 IN | SYSTOLIC BLOOD PRESSURE: 147 MMHG | OXYGEN SATURATION: 98 % | BODY MASS INDEX: 37.92 KG/M2 | DIASTOLIC BLOOD PRESSURE: 53 MMHG | WEIGHT: 214 LBS | RESPIRATION RATE: 18 BRPM | HEART RATE: 56 BPM | TEMPERATURE: 98 F

## 2023-10-31 VITALS
SYSTOLIC BLOOD PRESSURE: 130 MMHG | WEIGHT: 215 LBS | OXYGEN SATURATION: 97 % | BODY MASS INDEX: 38.09 KG/M2 | HEIGHT: 63 IN | HEART RATE: 63 BPM | DIASTOLIC BLOOD PRESSURE: 60 MMHG

## 2023-10-31 DIAGNOSIS — I26.99 PULMONARY EMBOLISM, OTHER, UNSPECIFIED CHRONICITY, UNSPECIFIED WHETHER ACUTE COR PULMONALE PRESENT (HCC): Primary | ICD-10-CM

## 2023-10-31 DIAGNOSIS — I26.99 ACUTE PULMONARY EMBOLISM (HCC): Primary | ICD-10-CM

## 2023-10-31 DIAGNOSIS — N18.9 CHRONIC KIDNEY DISEASE, UNSPECIFIED CKD STAGE: ICD-10-CM

## 2023-10-31 DIAGNOSIS — Z79.01 ANTICOAGULATION MANAGEMENT ENCOUNTER: ICD-10-CM

## 2023-10-31 DIAGNOSIS — I26.09 CHRONIC PULMONARY EMBOLISM WITH ACUTE COR PULMONALE, UNSPECIFIED PULMONARY EMBOLISM TYPE (HCC): Primary | ICD-10-CM

## 2023-10-31 DIAGNOSIS — N18.4 CHRONIC RENAL DISEASE, STAGE IV (HCC): ICD-10-CM

## 2023-10-31 DIAGNOSIS — I87.2 VENOUS INSUFFICIENCY OF BOTH LOWER EXTREMITIES: ICD-10-CM

## 2023-10-31 DIAGNOSIS — I27.82 CHRONIC PULMONARY EMBOLISM WITH ACUTE COR PULMONALE, UNSPECIFIED PULMONARY EMBOLISM TYPE (HCC): Primary | ICD-10-CM

## 2023-10-31 DIAGNOSIS — Z51.81 ANTICOAGULATION MANAGEMENT ENCOUNTER: ICD-10-CM

## 2023-10-31 DIAGNOSIS — I26.99 ACUTE PULMONARY EMBOLISM (HCC): ICD-10-CM

## 2023-10-31 PROCEDURE — 1111F DSCHRG MED/CURRENT MED MERGE: CPT | Performed by: INTERNAL MEDICINE

## 2023-10-31 PROCEDURE — 1126F AMNT PAIN NOTED NONE PRSNT: CPT | Performed by: INTERNAL MEDICINE

## 2023-10-31 PROCEDURE — 99214 OFFICE O/P EST MOD 30 MIN: CPT | Performed by: INTERNAL MEDICINE

## 2023-10-31 PROCEDURE — 99204 OFFICE O/P NEW MOD 45 MIN: CPT | Performed by: INTERNAL MEDICINE

## 2023-11-04 ENCOUNTER — LAB ENCOUNTER (OUTPATIENT)
Dept: LAB | Age: 88
End: 2023-11-04
Attending: INTERNAL MEDICINE
Payer: MEDICARE

## 2023-11-04 DIAGNOSIS — N18.4 CHRONIC RENAL DISEASE, STAGE IV (HCC): Primary | ICD-10-CM

## 2023-11-04 DIAGNOSIS — N39.0 RECURRENT UTI: ICD-10-CM

## 2023-11-04 LAB
ANION GAP SERPL CALC-SCNC: 11 MMOL/L (ref 0–18)
BUN BLD-MCNC: 16 MG/DL (ref 9–23)
BUN/CREAT SERPL: 13.4 (ref 10–20)
CALCIUM BLD-MCNC: 9.5 MG/DL (ref 8.7–10.4)
CHLORIDE SERPL-SCNC: 104 MMOL/L (ref 98–112)
CO2 SERPL-SCNC: 25 MMOL/L (ref 21–32)
CREAT BLD-MCNC: 1.19 MG/DL
EGFRCR SERPLBLD CKD-EPI 2021: 43 ML/MIN/1.73M2 (ref 60–?)
FASTING STATUS PATIENT QL REPORTED: YES
GLUCOSE BLD-MCNC: 94 MG/DL (ref 70–99)
OSMOLALITY SERPL CALC.SUM OF ELEC: 291 MOSM/KG (ref 275–295)
POTASSIUM SERPL-SCNC: 3.9 MMOL/L (ref 3.5–5.1)
SODIUM SERPL-SCNC: 140 MMOL/L (ref 136–145)

## 2023-11-04 PROCEDURE — 36415 COLL VENOUS BLD VENIPUNCTURE: CPT

## 2023-11-04 PROCEDURE — 80048 BASIC METABOLIC PNL TOTAL CA: CPT

## 2023-11-06 ENCOUNTER — TELEPHONE (OUTPATIENT)
Dept: INTERNAL MEDICINE CLINIC | Facility: CLINIC | Age: 88
End: 2023-11-06

## 2023-11-06 NOTE — TELEPHONE ENCOUNTER
Gricel Reyna from [place] [id number] called. Discharging patient from occupational therapy. As patient is doing as well as she can. She will still have home health come for nursing, and physical therapy. No call back is needed. Gricel Reyna didn't leave a phone number.

## 2023-11-09 ENCOUNTER — LAB ENCOUNTER (OUTPATIENT)
Dept: LAB | Facility: HOSPITAL | Age: 88
End: 2023-11-09
Attending: INTERNAL MEDICINE
Payer: MEDICARE

## 2023-11-09 PROCEDURE — 87086 URINE CULTURE/COLONY COUNT: CPT

## 2023-11-09 PROCEDURE — 87186 SC STD MICRODIL/AGAR DIL: CPT

## 2023-11-09 PROCEDURE — 87077 CULTURE AEROBIC IDENTIFY: CPT

## 2023-11-13 RX ORDER — AMOXICILLIN AND CLAVULANATE POTASSIUM 875; 125 MG/1; MG/1
1 TABLET, FILM COATED ORAL 2 TIMES DAILY
Qty: 14 TABLET | Refills: 0 | Status: SHIPPED | OUTPATIENT
Start: 2023-11-13 | End: 2023-11-20

## 2023-12-02 ENCOUNTER — OFFICE VISIT (OUTPATIENT)
Dept: FAMILY MEDICINE CLINIC | Facility: CLINIC | Age: 88
End: 2023-12-02
Payer: MEDICARE

## 2023-12-02 ENCOUNTER — HOSPITAL ENCOUNTER (OUTPATIENT)
Dept: GENERAL RADIOLOGY | Age: 88
Discharge: HOME | End: 2023-12-02
Attending: NURSE PRACTITIONER
Payer: MEDICARE

## 2023-12-02 ENCOUNTER — TELEPHONE (OUTPATIENT)
Dept: INTERNAL MEDICINE CLINIC | Facility: CLINIC | Age: 88
End: 2023-12-02

## 2023-12-02 VITALS
DIASTOLIC BLOOD PRESSURE: 70 MMHG | OXYGEN SATURATION: 98 % | RESPIRATION RATE: 18 BRPM | BODY MASS INDEX: 36.88 KG/M2 | TEMPERATURE: 98 F | HEIGHT: 64 IN | WEIGHT: 216 LBS | HEART RATE: 65 BPM | SYSTOLIC BLOOD PRESSURE: 166 MMHG

## 2023-12-02 DIAGNOSIS — J06.9 VIRAL URI: ICD-10-CM

## 2023-12-02 DIAGNOSIS — J02.9 SORE THROAT: ICD-10-CM

## 2023-12-02 DIAGNOSIS — J06.9 VIRAL URI: Primary | ICD-10-CM

## 2023-12-02 DIAGNOSIS — Z11.52 ENCOUNTER FOR SCREENING FOR COVID-19: ICD-10-CM

## 2023-12-02 LAB
CONTROL LINE PRESENT WITH A CLEAR BACKGROUND (YES/NO): YES YES/NO
KIT LOT #: NORMAL NUMERIC
OPERATOR ID: ABNORMAL
POCT LOT NUMBER: ABNORMAL
RAPID SARS-COV-2 BY PCR: NOT DETECTED

## 2023-12-02 PROCEDURE — 99213 OFFICE O/P EST LOW 20 MIN: CPT | Performed by: NURSE PRACTITIONER

## 2023-12-02 PROCEDURE — 71046 X-RAY EXAM CHEST 2 VIEWS: CPT | Performed by: NURSE PRACTITIONER

## 2023-12-02 PROCEDURE — 87880 STREP A ASSAY W/OPTIC: CPT | Performed by: NURSE PRACTITIONER

## 2023-12-02 PROCEDURE — 87637 SARSCOV2&INF A&B&RSV AMP PRB: CPT | Performed by: NURSE PRACTITIONER

## 2023-12-02 PROCEDURE — U0002 COVID-19 LAB TEST NON-CDC: HCPCS | Performed by: NURSE PRACTITIONER

## 2023-12-02 NOTE — TELEPHONE ENCOUNTER
Paged by the patient patient with fever and congestion   Advised to go to [facility] and be tested for flu and covid to be treated accordingly

## 2023-12-03 LAB
FLUAV + FLUBV RNA SPEC NAA+PROBE: NOT DETECTED
FLUAV + FLUBV RNA SPEC NAA+PROBE: NOT DETECTED
RSV RNA SPEC NAA+PROBE: DETECTED
SARS-COV-2 RNA RESP QL NAA+PROBE: NOT DETECTED

## 2023-12-04 ENCOUNTER — HOSPITAL ENCOUNTER (INPATIENT)
Facility: HOSPITAL | Age: 88
LOS: 4 days | Discharge: HOME | End: 2023-12-08
Attending: EMERGENCY MEDICINE | Admitting: INTERNAL MEDICINE
Payer: MEDICARE

## 2023-12-04 ENCOUNTER — APPOINTMENT (OUTPATIENT)
Dept: GENERAL RADIOLOGY | Facility: HOSPITAL | Age: 88
End: 2023-12-04
Attending: EMERGENCY MEDICINE
Payer: MEDICARE

## 2023-12-04 DIAGNOSIS — J21.0 ACUTE BRONCHIOLITIS DUE TO RESPIRATORY SYNCYTIAL VIRUS (RSV): Primary | ICD-10-CM

## 2023-12-04 LAB
ANION GAP SERPL CALC-SCNC: 8 MMOL/L (ref 0–18)
BASOPHILS # BLD AUTO: 0.04 X10(3) UL (ref 0–0.2)
BASOPHILS NFR BLD AUTO: 0.9 %
BNP SERPL-MCNC: 137 PG/ML
BUN BLD-MCNC: 16 MG/DL (ref 9–23)
BUN/CREAT SERPL: 15.7 (ref 10–20)
CALCIUM BLD-MCNC: 9.2 MG/DL (ref 8.7–10.4)
CHLORIDE SERPL-SCNC: 103 MMOL/L (ref 98–112)
CO2 SERPL-SCNC: 30 MMOL/L (ref 21–32)
CREAT BLD-MCNC: 1.02 MG/DL
DEPRECATED RDW RBC AUTO: 41.4 FL (ref 35.1–46.3)
EGFRCR SERPLBLD CKD-EPI 2021: 52 ML/MIN/1.73M2 (ref 60–?)
EOSINOPHIL # BLD AUTO: 0.08 X10(3) UL (ref 0–0.7)
EOSINOPHIL NFR BLD AUTO: 1.8 %
ERYTHROCYTE [DISTWIDTH] IN BLOOD BY AUTOMATED COUNT: 12.6 % (ref 11–15)
GLUCOSE BLD-MCNC: 95 MG/DL (ref 70–99)
HCT VFR BLD AUTO: 39.6 %
HGB BLD-MCNC: 13.2 G/DL
IMM GRANULOCYTES # BLD AUTO: 0.01 X10(3) UL (ref 0–1)
IMM GRANULOCYTES NFR BLD: 0.2 %
LYMPHOCYTES # BLD AUTO: 2.08 X10(3) UL (ref 1–4)
LYMPHOCYTES NFR BLD AUTO: 47.2 %
MCH RBC QN AUTO: 29.7 PG (ref 26–34)
MCHC RBC AUTO-ENTMCNC: 33.3 G/DL (ref 31–37)
MCV RBC AUTO: 89.2 FL
MONOCYTES # BLD AUTO: 0.48 X10(3) UL (ref 0.1–1)
MONOCYTES NFR BLD AUTO: 10.9 %
NEUTROPHILS # BLD AUTO: 1.72 X10 (3) UL (ref 1.5–7.7)
NEUTROPHILS # BLD AUTO: 1.72 X10(3) UL (ref 1.5–7.7)
NEUTROPHILS NFR BLD AUTO: 39 %
OSMOLALITY SERPL CALC.SUM OF ELEC: 293 MOSM/KG (ref 275–295)
PLATELET # BLD AUTO: 180 10(3)UL (ref 150–450)
POTASSIUM SERPL-SCNC: 3.3 MMOL/L (ref 3.5–5.1)
RBC # BLD AUTO: 4.44 X10(6)UL
SODIUM SERPL-SCNC: 141 MMOL/L (ref 136–145)
WBC # BLD AUTO: 4.4 X10(3) UL (ref 4–11)

## 2023-12-04 PROCEDURE — 71045 X-RAY EXAM CHEST 1 VIEW: CPT | Performed by: EMERGENCY MEDICINE

## 2023-12-04 PROCEDURE — 99223 1ST HOSP IP/OBS HIGH 75: CPT | Performed by: INTERNAL MEDICINE

## 2023-12-04 RX ORDER — METOPROLOL SUCCINATE 50 MG/1
50 TABLET, EXTENDED RELEASE ORAL DAILY
Status: DISCONTINUED | OUTPATIENT
Start: 2023-12-05 | End: 2023-12-08

## 2023-12-04 RX ORDER — PANTOPRAZOLE SODIUM 40 MG/1
40 TABLET, DELAYED RELEASE ORAL
Status: DISCONTINUED | OUTPATIENT
Start: 2023-12-05 | End: 2023-12-08

## 2023-12-04 RX ORDER — HEPARIN SODIUM 5000 [USP'U]/ML
5000 INJECTION, SOLUTION INTRAVENOUS; SUBCUTANEOUS EVERY 12 HOURS SCHEDULED
Status: DISCONTINUED | OUTPATIENT
Start: 2023-12-04 | End: 2023-12-04

## 2023-12-04 RX ORDER — DILTIAZEM HYDROCHLORIDE 120 MG/1
240 CAPSULE, EXTENDED RELEASE ORAL DAILY
Status: DISCONTINUED | OUTPATIENT
Start: 2023-12-05 | End: 2023-12-08

## 2023-12-04 RX ORDER — METHYLPREDNISOLONE SODIUM SUCCINATE 125 MG/2ML
90 INJECTION, POWDER, LYOPHILIZED, FOR SOLUTION INTRAMUSCULAR; INTRAVENOUS ONCE
Status: COMPLETED | OUTPATIENT
Start: 2023-12-04 | End: 2023-12-04

## 2023-12-04 RX ORDER — ALBUTEROL SULFATE 90 UG/1
2 AEROSOL, METERED RESPIRATORY (INHALATION) EVERY 4 HOURS PRN
Qty: 18 G | Refills: 0 | Status: SHIPPED | OUTPATIENT
Start: 2023-12-04 | End: 2023-12-08

## 2023-12-04 RX ORDER — FUROSEMIDE 40 MG/1
40 TABLET ORAL 2 TIMES DAILY
Status: DISCONTINUED | OUTPATIENT
Start: 2023-12-04 | End: 2023-12-08

## 2023-12-04 RX ORDER — IPRATROPIUM BROMIDE AND ALBUTEROL SULFATE 2.5; .5 MG/3ML; MG/3ML
3 SOLUTION RESPIRATORY (INHALATION) ONCE
Status: COMPLETED | OUTPATIENT
Start: 2023-12-04 | End: 2023-12-04

## 2023-12-04 RX ORDER — PREDNISONE 20 MG/1
40 TABLET ORAL DAILY
Qty: 6 TABLET | Refills: 0 | Status: SHIPPED | OUTPATIENT
Start: 2023-12-04 | End: 2023-12-07

## 2023-12-04 RX ORDER — ACETAMINOPHEN 500 MG
500 TABLET ORAL EVERY 4 HOURS PRN
Status: DISCONTINUED | OUTPATIENT
Start: 2023-12-04 | End: 2023-12-08

## 2023-12-04 RX ORDER — ALBUTEROL SULFATE 90 UG/1
2 AEROSOL, METERED RESPIRATORY (INHALATION) EVERY 6 HOURS PRN
Status: DISCONTINUED | OUTPATIENT
Start: 2023-12-04 | End: 2023-12-08

## 2023-12-05 LAB
ALBUMIN SERPL-MCNC: 3.8 G/DL (ref 3.2–4.8)
ALBUMIN/GLOB SERPL: 1.2 {RATIO} (ref 1–2)
ALP LIVER SERPL-CCNC: 87 U/L
ALT SERPL-CCNC: 27 U/L
ANION GAP SERPL CALC-SCNC: 7 MMOL/L (ref 0–18)
AST SERPL-CCNC: 34 U/L (ref ?–34)
BASOPHILS # BLD AUTO: 0 X10(3) UL (ref 0–0.2)
BASOPHILS NFR BLD AUTO: 0 %
BILIRUB SERPL-MCNC: 0.4 MG/DL (ref 0.2–0.9)
BUN BLD-MCNC: 18 MG/DL (ref 9–23)
BUN/CREAT SERPL: 17 (ref 10–20)
CALCIUM BLD-MCNC: 9.1 MG/DL (ref 8.7–10.4)
CHLORIDE SERPL-SCNC: 104 MMOL/L (ref 98–112)
CO2 SERPL-SCNC: 28 MMOL/L (ref 21–32)
CREAT BLD-MCNC: 1.06 MG/DL
DEPRECATED RDW RBC AUTO: 41.1 FL (ref 35.1–46.3)
EGFRCR SERPLBLD CKD-EPI 2021: 50 ML/MIN/1.73M2 (ref 60–?)
EOSINOPHIL # BLD AUTO: 0 X10(3) UL (ref 0–0.7)
EOSINOPHIL NFR BLD AUTO: 0 %
ERYTHROCYTE [DISTWIDTH] IN BLOOD BY AUTOMATED COUNT: 12.4 % (ref 11–15)
GLOBULIN PLAS-MCNC: 3.1 G/DL (ref 2.8–4.4)
GLUCOSE BLD-MCNC: 161 MG/DL (ref 70–99)
HCT VFR BLD AUTO: 36.3 %
HGB BLD-MCNC: 12.1 G/DL
IMM GRANULOCYTES # BLD AUTO: 0.01 X10(3) UL (ref 0–1)
IMM GRANULOCYTES NFR BLD: 0.4 %
LYMPHOCYTES # BLD AUTO: 0.47 X10(3) UL (ref 1–4)
LYMPHOCYTES NFR BLD AUTO: 21 %
MCH RBC QN AUTO: 29.7 PG (ref 26–34)
MCHC RBC AUTO-ENTMCNC: 33.3 G/DL (ref 31–37)
MCV RBC AUTO: 89.2 FL
MONOCYTES # BLD AUTO: 0.03 X10(3) UL (ref 0.1–1)
MONOCYTES NFR BLD AUTO: 1.3 %
NEUTROPHILS # BLD AUTO: 1.73 X10 (3) UL (ref 1.5–7.7)
NEUTROPHILS # BLD AUTO: 1.73 X10(3) UL (ref 1.5–7.7)
NEUTROPHILS NFR BLD AUTO: 77.3 %
OSMOLALITY SERPL CALC.SUM OF ELEC: 293 MOSM/KG (ref 275–295)
PLATELET # BLD AUTO: 165 10(3)UL (ref 150–450)
POTASSIUM SERPL-SCNC: 3.4 MMOL/L (ref 3.5–5.1)
PROT SERPL-MCNC: 6.9 G/DL (ref 5.7–8.2)
RBC # BLD AUTO: 4.07 X10(6)UL
SODIUM SERPL-SCNC: 139 MMOL/L (ref 136–145)
WBC # BLD AUTO: 2.2 X10(3) UL (ref 4–11)

## 2023-12-05 PROCEDURE — 99233 SBSQ HOSP IP/OBS HIGH 50: CPT | Performed by: INTERNAL MEDICINE

## 2023-12-05 RX ORDER — GUAIFENESIN 600 MG/1
600 TABLET, EXTENDED RELEASE ORAL 2 TIMES DAILY
Status: DISCONTINUED | OUTPATIENT
Start: 2023-12-05 | End: 2023-12-08

## 2023-12-05 RX ORDER — POTASSIUM CHLORIDE 20 MEQ/1
40 TABLET, EXTENDED RELEASE ORAL ONCE
Status: COMPLETED | OUTPATIENT
Start: 2023-12-05 | End: 2023-12-05

## 2023-12-05 RX ORDER — IPRATROPIUM BROMIDE AND ALBUTEROL SULFATE 2.5; .5 MG/3ML; MG/3ML
3 SOLUTION RESPIRATORY (INHALATION) EVERY 6 HOURS PRN
Status: DISCONTINUED | OUTPATIENT
Start: 2023-12-05 | End: 2023-12-08

## 2023-12-05 NOTE — CM/SW NOTE
12/05/23 1300   CM/SW Referral Data   Referral Source Physician   Reason for Referral Discharge planning   Informant Clinical Staff Member;EMR   Medical Hx   Does patient have an established PCP? Yes  Laura Encarnacion)   Patient Info   Patient's Current Mental Status at Time of Assessment Alert;Oriented   Patient's [address]   Patient lives with Alone   Patient Status Prior to Admission   Independent with ADLs and Mobility Yes   Services in place prior to admission [facility] Provider [facility]  (RN, PT, and OT)   Discharge Needs   Anticipated D/C needs Home health care     Pt discussed during nursing rounds. Dx RSV on RA. Home alone, independent w/walker at baseline. [facility]PT[facility]OT, KATIE entered. PT/OT evals pending. Plan: Home w/[facility] pending evals and medical clearance. / to remain available for support and/or discharge planning.      CATRACHITA Edmondson    [phone]

## 2023-12-05 NOTE — PLAN OF CARE
Patient is Aox4, vitals stable on room air and meds given as ordered. Patient ambulatory with walker and assistance. No complaints of pain noted. Problem: Patient Centered Care  Goal: Patient preferences are identified and integrated in the patient's plan of care  Description: Interventions:  - What would you like us to know as we care for you? I live at home alone  - Provide timely, complete, and accurate information to patient/family  - Incorporate patient and family knowledge, values, beliefs, and cultural backgrounds into the planning and delivery of care  - Encourage patient/family to participate in care and decision-making at the level they choose  - Honor patient and family perspectives and choices  Outcome: Progressing     Problem: Patient/Family Goals  Goal: Patient/Family Long Term Goal  Description: Patient's Long Term Goal: Resolve cough    Interventions:  - Administer antitussives as needed  - Monitor vital signs  - encourage adequate hydration  - See additional Care Plan goals for specific interventions  Outcome: Progressing  Goal: Patient/Family Short Term Goal  Description: Patient's Short Term Goal: resolve shortness of breath    Interventions:   - Monitor respiratory status  - monitor vital signs  - administer oxygen as needed  - See additional Care Plan goals for specific interventions  Outcome: Progressing     Problem: CARDIOVASCULAR - ADULT  Goal: Maintains optimal cardiac output and hemodynamic stability  Description: INTERVENTIONS:  - Monitor vital signs, rhythm, and trends  - Monitor for bleeding, hypotension and signs of decreased cardiac output  - Evaluate effectiveness of vasoactive medications to optimize hemodynamic stability  - Monitor arterial and/or venous puncture sites for bleeding and/or hematoma  - Assess quality of pulses, skin color and temperature  - Assess for signs of decreased coronary artery perfusion - ex.  Angina  - Evaluate fluid balance, assess for edema, trend weights  Outcome: Progressing  Goal: Absence of cardiac arrhythmias or at baseline  Description: INTERVENTIONS:  - Continuous cardiac monitoring, monitor vital signs, obtain 12 lead EKG if indicated  - Evaluate effectiveness of antiarrhythmic and heart rate control medications as ordered  - Initiate emergency measures for life threatening arrhythmias  - Monitor electrolytes and administer replacement therapy as ordered  Outcome: Progressing     Problem: RESPIRATORY - ADULT  Goal: Achieves optimal ventilation and oxygenation  Description: INTERVENTIONS:  - Assess for changes in respiratory status  - Assess for changes in mentation and behavior  - Position to facilitate oxygenation and minimize respiratory effort  - Oxygen supplementation based on oxygen saturation or ABGs  - Provide Smoking Cessation handout, if applicable  - Encourage broncho-pulmonary hygiene including cough, deep breathe, Incentive Spirometry  - Assess the need for suctioning and perform as needed  - Assess and instruct to report SOB or any respiratory difficulty  - Respiratory Therapy support as indicated  - Manage/alleviate anxiety  - Monitor for signs/symptoms of CO2 retention  Outcome: Progressing     Problem: METABOLIC/FLUID AND ELECTROLYTES - ADULT  Goal: Glucose maintained within prescribed range  Description: INTERVENTIONS:  - Monitor Blood Glucose as ordered  - Assess for signs and symptoms of hyperglycemia and hypoglycemia  - Administer ordered medications to maintain glucose within target range  - Assess barriers to adequate nutritional intake and initiate nutrition consult as needed  - Instruct patient on self management of diabetes  Outcome: Progressing  Goal: Electrolytes maintained within normal limits  Description: INTERVENTIONS:  - Monitor labs and rhythm and assess patient for signs and symptoms of electrolyte imbalances  - Administer electrolyte replacement as ordered  - Monitor response to electrolyte replacements, including rhythm and repeat lab results as appropriate  - Fluid restriction as ordered  - Instruct patient on fluid and nutrition restrictions as appropriate  Outcome: Progressing  Goal: Hemodynamic stability and optimal renal function maintained  Description: INTERVENTIONS:  - Monitor labs and assess for signs and symptoms of volume excess or deficit  - Monitor intake, output and patient weight  - Monitor urine specific gravity, serum osmolarity and serum sodium as indicated or ordered  - Monitor response to interventions for patient's volume status, including labs, urine output, blood pressure (other measures as available)  - Encourage oral intake as appropriate  - Instruct patient on fluid and nutrition restrictions as appropriate  Outcome: Progressing     Problem: SKIN/TISSUE INTEGRITY - ADULT  Goal: Skin integrity remains intact  Description: INTERVENTIONS  - Assess and document risk factors for pressure ulcer development  - Assess and document skin integrity  - Monitor for areas of redness and/or skin breakdown  - Initiate interventions, skin care algorithm/standards of care as needed  Outcome: Progressing     Problem: MUSCULOSKELETAL - ADULT  Goal: Return mobility to safest level of function  Description: INTERVENTIONS:  - Assess patient stability and activity tolerance for standing, transferring and ambulating w/ or w/o assistive devices  - Assist with transfers and ambulation using safe patient handling equipment as needed  - Ensure adequate protection for wounds/incisions during mobilization  - Obtain PT/OT consults as needed  - Advance activity as appropriate  - Communicate ordered activity level and limitations with patient/family  Outcome: Progressing  Goal: Maintain proper alignment of affected body part  Description: INTERVENTIONS:  - Support and protect limb and body alignment per provider's orders  - Instruct and reinforce with patient and family use of appropriate assistive device and precautions (e.g. spinal or hip dislocation precautions)  Outcome: Progressing     Problem: PAIN - ADULT  Goal: Verbalizes/displays adequate comfort level or patient's stated pain goal  Description: INTERVENTIONS:  - Encourage pt to monitor pain and request assistance  - Assess pain using appropriate pain scale  - Administer analgesics based on type and severity of pain and evaluate response  - Implement non-pharmacological measures as appropriate and evaluate response  - Consider cultural and social influences on pain and pain management  - Manage/alleviate anxiety  - Utilize distraction and/or relaxation techniques  - Monitor for opioid side effects  - Notify MD/LIP if interventions unsuccessful or patient reports new pain  - Anticipate increased pain with activity and pre-medicate as appropriate  Outcome: Progressing     Problem: SAFETY ADULT - FALL  Goal: Free from fall injury  Description: INTERVENTIONS:  - Assess pt frequently for physical needs  - Identify cognitive and physical deficits and behaviors that affect risk of falls.   - [place] fall precautions as indicated by assessment.  - Educate pt/family on patient safety including physical limitations  - Instruct pt to call for assistance with activity based on assessment  - Modify environment to reduce risk of injury  - Provide assistive devices as appropriate  - Consider OT/PT consult to assist with strengthening/mobility  - Encourage toileting schedule  Outcome: Progressing     Problem: DISCHARGE PLANNING  Goal: Discharge to home or other facility with appropriate resources  Description: INTERVENTIONS:  - Identify barriers to discharge w/pt and caregiver  - Include patient/family/discharge partner in discharge planning  - Arrange for needed discharge resources and transportation as appropriate  - Identify discharge learning needs (meds, wound care, etc)  - Arrange for interpreters to assist at discharge as needed  - Consider post-discharge preferences of patient/family/discharge partner  - Complete POLST form as appropriate  - Assess patient's ability to be responsible for managing their own health  - Refer to Case Management Department for coordinating discharge planning if the patient needs post-hospital services based on physician/LIP order or complex needs related to functional status, cognitive ability or social support system  Outcome: Progressing Unknown

## 2023-12-05 NOTE — HOME CARE LIAISON
This pt is current with [facility] for RN/PT/OT services. Pt will need a KATIE entered and Quality data delivered by [facility]. Notified CHERISE Garcia. 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 Had some constipation with mild blood streaking on a second hard BM yesterday.    Change from previous past medical, family or social history:	[x] No	[] Yes:    REVIEW OF SYSTEMS  All review of systems negative, except for those marked:  General:	[ ] Abnormal:  Pulmonary:	[ ] Abnormal:  Cardiac:		[ ] Abnormal:  Gastrointestinal:	[x] Abnormal: constipation  ENT:		[ ] Abnormal:  Renal/Urologic:	[ ] Abnormal:  Musculoskeletal	[ ] Abnormal:  Endocrine:	[ ] Abnormal:  Hematologic:	[ ] Abnormal:  Neurologic:	[ ] Abnormal:  Skin:		[ ] Abnormal:  Allergy/Immune	[ ] Abnormal:  Psychiatric:	[ ] Abnormal:    Allergies    penicillin (Rash)    Intolerances      OTHER MEDICATIONS  (STANDING):  dextrose 5% + sodium chloride 0.9%. - Pediatric 1000 milliLiter(s) IV Continuous <Continuous>  cefepime  IV Intermittent - Peds 1220 milliGRAM(s) IV Intermittent every 8 hours  nystatin Oral Liquid - Peds 587322 Unit(s) Swish and Swallow three times a day  ranitidine  Oral Liquid - Peds 30 milliGRAM(s) Oral two times a day  trimethoprim  /sulfamethoxazole Oral Liquid - Peds 60 milliGRAM(s) Oral <User Schedule>  Biotene Dry Mouth Oral Rinse - Peds 15 milliLiter(s) Swish and Spit two times a day  gabapentin Oral Liquid - Peds 250 milliGRAM(s) Oral daily  polyethylene glycol 3350 Oral Powder - Peds 17 Gram(s) Oral daily  senna Oral Liquid - Peds 3 milliLiter(s) Oral daily    DIET: regular    Vital Signs Last 24 Hrs  T(C): 36.7 (24 Sep 2017 06:45), Max: 37.2 (23 Sep 2017 14:30)  T(F): 98 (24 Sep 2017 06:45), Max: 98.9 (23 Sep 2017 14:30)  HR: 79 (24 Sep 2017 06:45) (79 - 127)  BP: 96/43 (24 Sep 2017 06:45) (96/43 - 108/62)  BP(mean): --  RR: 24 (24 Sep 2017 06:45) (22 - 34)  SpO2: 100% (24 Sep 2017 06:45) (96% - 100%)  I&O's Summary    23 Sep 2017 07:01  -  24 Sep 2017 07:00  --------------------------------------------------------  IN: 1235 mL / OUT: 0 mL / NET: 1235 mL      Pain Score (0-10):		Lansky/Karnofsky Score:     PATIENT CARE ACCESS  [] Peripheral IV  [] Central Venous Line	[] R	[] L	[] IJ	[] Fem	[] SC			[] Placed:  [] PICC, Date Placed:			[] Broviac – __ Lumen, Date Placed:  [x] Mediport, Date Placed:		[] MedComp, Date Placed:  [] Urinary Catheter, Date Placed:  []  Shunt, Date Placed:		Programmable:		[] Yes	[] No  [] Ommaya, Date Placed:  [] Necessity of urinary, arterial, and venous catheters discussed    PHYSICAL EXAM  All physical exam findings normal, except those marked:  Constitutional:	Normal: well appearing, in no apparent distress  .		[] Abnormal:  Eyes		Normal: no conjunctival injection, symmetric gaze  .		[] Abnormal:  ENT:		Normal: mucus membranes moist, no mouth sores or mucosal bleeding, normal  .		dentition, symmetric facies.  .		[] Abnormal:  Neck		Normal: no thyromegaly or masses appreciated  .		[] Abnormal:  Cardiovascular	Normal: regular rate, normal S1, S2, no murmurs, rubs or gallops  .		[] Abnormal:  Respiratory	Normal: clear to auscultation bilaterally, no wheezing  .		[] Abnormal:  Abdominal	Normal: normoactive bowel sounds, soft, NT, no hepatosplenomegaly, no   .		masses  .		[] Abnormal:  		Normal normal genitalia, testes descended  .		[] Abnormal:  Lymphatic	Normal: no adenopathy appreciated  .		[] Abnormal:  Extremities	Normal: FROM x4, no cyanosis or edema, symmetric pulses  .		[] Abnormal:  Skin		Normal: normal appearance, no rash, nodules, vesicles, ulcers or erythema, CVL  .		site well healed with no erythema or pain  .		[] Abnormal:  Neurologic	Normal: no focal deficits, gait normal and normal motor exam.  .		[] Abnormal:  Psychiatric	Normal: affect appropriate  		[] Abnormal:  Musculoskeletal		Normal: full range of motion and no deformities appreciated, no masses   .			and normal strength in all extremities.  .			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 Had some constipation with mild blood streaking on a second hard BM yesterday.    Change from previous past medical, family or social history:	[x] No	[] Yes:    REVIEW OF SYSTEMS  All review of systems negative, except for those marked:  General:	[ ] Abnormal:  Pulmonary:	[ ] Abnormal:  Cardiac:		[ ] Abnormal:  Gastrointestinal:	[x] Abnormal: constipation  ENT:		[ ] Abnormal:  Renal/Urologic:	[ ] Abnormal:  Musculoskeletal	[ ] Abnormal:  Endocrine:	[ ] Abnormal:  Hematologic:	[ ] Abnormal:  Neurologic:	[ ] Abnormal:  Skin:		[ ] Abnormal:  Allergy/Immune	[ ] Abnormal:  Psychiatric:	[ ] Abnormal:    Allergies    penicillin (Rash)    Intolerances      OTHER MEDICATIONS  (STANDING):  dextrose 5% + sodium chloride 0.9%. - Pediatric 1000 milliLiter(s) IV Continuous <Continuous>  cefepime  IV Intermittent - Peds 1220 milliGRAM(s) IV Intermittent every 8 hours  nystatin Oral Liquid - Peds 998239 Unit(s) Swish and Swallow three times a day  ranitidine  Oral Liquid - Peds 30 milliGRAM(s) Oral two times a day  trimethoprim  /sulfamethoxazole Oral Liquid - Peds 60 milliGRAM(s) Oral <User Schedule>  Biotene Dry Mouth Oral Rinse - Peds 15 milliLiter(s) Swish and Spit two times a day  gabapentin Oral Liquid - Peds 250 milliGRAM(s) Oral daily  polyethylene glycol 3350 Oral Powder - Peds 17 Gram(s) Oral daily  senna Oral Liquid - Peds 3 milliLiter(s) Oral daily    DIET: regular    Vital Signs Last 24 Hrs  T(C): 36.7 (24 Sep 2017 06:45), Max: 37.2 (23 Sep 2017 14:30)  T(F): 98 (24 Sep 2017 06:45), Max: 98.9 (23 Sep 2017 14:30)  HR: 79 (24 Sep 2017 06:45) (79 - 127)  BP: 96/43 (24 Sep 2017 06:45) (96/43 - 108/62)  BP(mean): --  RR: 24 (24 Sep 2017 06:45) (22 - 34)  SpO2: 100% (24 Sep 2017 06:45) (96% - 100%)  I&O's Summary    23 Sep 2017 07:01  -  24 Sep 2017 07:00  --------------------------------------------------------  IN: 1235 mL / OUT: 0 mL / NET: 1235 mL      Pain Score (0-10):		Lansky/Karnofsky Score:     PATIENT CARE ACCESS  [] Peripheral IV  [] Central Venous Line	[] R	[] L	[] IJ	[] Fem	[] SC			[] Placed:  [] PICC, Date Placed:			[] Broviac – __ Lumen, Date Placed:  [x] Mediport, Date Placed:		[] MedComp, Date Placed:  [] Urinary Catheter, Date Placed:  []  Shunt, Date Placed:		Programmable:		[] Yes	[] No  [] Ommaya, Date Placed:  [] Necessity of urinary, arterial, and venous catheters discussed    PHYSICAL EXAM  All physical exam findings normal, except those marked:  Constitutional:	Normal: well appearing, in no apparent distress  Eyes		Normal: no conjunctival injection, symmetric gaze  ENT:		Normal: mucus membranes moist  Cardiovascular	Normal: regular rate, normal S1, S2, no murmurs, rubs or gallops  Respiratory	Normal: clear to auscultation bilaterally, no wheezing  Abdominal	Normal: normoactive bowel sounds, soft, NT, no hepatosplenomegaly, no   .		masses  Lymphatic	Normal: no adenopathy appreciated  Extremities	Normal: FROM x4, no cyanosis or edema  Skin		Normal: normal appearance, no rash, mediport in place, dressing c/d/i  Neurologic	Normal: no focal deficits      Lab Results:                                            8.9                   Neurophils% (auto):   8.7    (09-24 @ 05:15):    2.07 )-----------(433          Lymphocytes% (auto):  52.2                                          25.2                   Eosinphils% (auto):   4.3      Manual%: Neutrophils 7.3  ; Lymphocytes 57.4 ; Eosinophils 16.2 ; Bands%: x    ; Blasts x         Differential:	[] Automated		[x] Manual

## 2023-12-05 NOTE — PLAN OF CARE
Problem: Patient Centered Care  Goal: Patient preferences are identified and integrated in the patient's plan of care  Description: Interventions:  - What would you like us to know as we care for you? I live at home alone  - Provide timely, complete, and accurate information to patient/family  - Incorporate patient and family knowledge, values, beliefs, and cultural backgrounds into the planning and delivery of care  - Encourage patient/family to participate in care and decision-making at the level they choose  - Honor patient and family perspectives and choices  Outcome: Progressing     Problem: Patient/Family Goals  Goal: Patient/Family Long Term Goal  Description: Patient's Long Term Goal: Resolve cough    Interventions:  - Administer antitussives as needed  - Monitor vital signs  - encourage adequate hydration  - See additional Care Plan goals for specific interventions  Outcome: Progressing  Goal: Patient/Family Short Term Goal  Description: Patient's Short Term Goal: resolve shortness of breath    Interventions:   - Monitor respiratory status  - monitor vital signs  - administer oxygen as needed  - See additional Care Plan goals for specific interventions  Outcome: Progressing     Problem: CARDIOVASCULAR - ADULT  Goal: Maintains optimal cardiac output and hemodynamic stability  Description: INTERVENTIONS:  - Monitor vital signs, rhythm, and trends  - Monitor for bleeding, hypotension and signs of decreased cardiac output  - Evaluate effectiveness of vasoactive medications to optimize hemodynamic stability  - Monitor arterial and/or venous puncture sites for bleeding and/or hematoma  - Assess quality of pulses, skin color and temperature  - Assess for signs of decreased coronary artery perfusion - ex.  Angina  - Evaluate fluid balance, assess for edema, trend weights  Outcome: Progressing  Goal: Absence of cardiac arrhythmias or at baseline  Description: INTERVENTIONS:  - Continuous cardiac monitoring, monitor vital signs, obtain 12 lead EKG if indicated  - Evaluate effectiveness of antiarrhythmic and heart rate control medications as ordered  - Initiate emergency measures for life threatening arrhythmias  - Monitor electrolytes and administer replacement therapy as ordered  Outcome: Progressing     Problem: RESPIRATORY - ADULT  Goal: Achieves optimal ventilation and oxygenation  Description: INTERVENTIONS:  - Assess for changes in respiratory status  - Assess for changes in mentation and behavior  - Position to facilitate oxygenation and minimize respiratory effort  - Oxygen supplementation based on oxygen saturation or ABGs  - Provide Smoking Cessation handout, if applicable  - Encourage broncho-pulmonary hygiene including cough, deep breathe, Incentive Spirometry  - Assess the need for suctioning and perform as needed  - Assess and instruct to report SOB or any respiratory difficulty  - Respiratory Therapy support as indicated  - Manage/alleviate anxiety  - Monitor for signs/symptoms of CO2 retention  Outcome: Progressing     Problem: METABOLIC/FLUID AND ELECTROLYTES - ADULT  Goal: Glucose maintained within prescribed range  Description: INTERVENTIONS:  - Monitor Blood Glucose as ordered  - Assess for signs and symptoms of hyperglycemia and hypoglycemia  - Administer ordered medications to maintain glucose within target range  - Assess barriers to adequate nutritional intake and initiate nutrition consult as needed  - Instruct patient on self management of diabetes  Outcome: Progressing  Goal: Electrolytes maintained within normal limits  Description: INTERVENTIONS:  - Monitor labs and rhythm and assess patient for signs and symptoms of electrolyte imbalances  - Administer electrolyte replacement as ordered  - Monitor response to electrolyte replacements, including rhythm and repeat lab results as appropriate  - Fluid restriction as ordered  - Instruct patient on fluid and nutrition restrictions as appropriate  Outcome: Progressing  Goal: Hemodynamic stability and optimal renal function maintained  Description: INTERVENTIONS:  - Monitor labs and assess for signs and symptoms of volume excess or deficit  - Monitor intake, output and patient weight  - Monitor urine specific gravity, serum osmolarity and serum sodium as indicated or ordered  - Monitor response to interventions for patient's volume status, including labs, urine output, blood pressure (other measures as available)  - Encourage oral intake as appropriate  - Instruct patient on fluid and nutrition restrictions as appropriate  Outcome: Progressing     Problem: SKIN/TISSUE INTEGRITY - ADULT  Goal: Skin integrity remains intact  Description: INTERVENTIONS  - Assess and document risk factors for pressure ulcer development  - Assess and document skin integrity  - Monitor for areas of redness and/or skin breakdown  - Initiate interventions, skin care algorithm/standards of care as needed  Outcome: Progressing     Problem: MUSCULOSKELETAL - ADULT  Goal: Return mobility to safest level of function  Description: INTERVENTIONS:  - Assess patient stability and activity tolerance for standing, transferring and ambulating w/ or w/o assistive devices  - Assist with transfers and ambulation using safe patient handling equipment as needed  - Ensure adequate protection for wounds/incisions during mobilization  - Obtain PT/OT consults as needed  - Advance activity as appropriate  - Communicate ordered activity level and limitations with patient/family  Outcome: Progressing  Goal: Maintain proper alignment of affected body part  Description: INTERVENTIONS:  - Support and protect limb and body alignment per provider's orders  - Instruct and reinforce with patient and family use of appropriate assistive device and precautions (e.g. spinal or hip dislocation precautions)  Outcome: Progressing     Problem: PAIN - ADULT  Goal: Verbalizes/displays adequate comfort level or patient's stated pain goal  Description: INTERVENTIONS:  - Encourage pt to monitor pain and request assistance  - Assess pain using appropriate pain scale  - Administer analgesics based on type and severity of pain and evaluate response  - Implement non-pharmacological measures as appropriate and evaluate response  - Consider cultural and social influences on pain and pain management  - Manage/alleviate anxiety  - Utilize distraction and/or relaxation techniques  - Monitor for opioid side effects  - Notify MD/LIP if interventions unsuccessful or patient reports new pain  - Anticipate increased pain with activity and pre-medicate as appropriate  Outcome: Progressing     Problem: SAFETY ADULT - FALL  Goal: Free from fall injury  Description: INTERVENTIONS:  - Assess pt frequently for physical needs  - Identify cognitive and physical deficits and behaviors that affect risk of falls.   - [place] fall precautions as indicated by assessment.  - Educate pt/family on patient safety including physical limitations  - Instruct pt to call for assistance with activity based on assessment  - Modify environment to reduce risk of injury  - Provide assistive devices as appropriate  - Consider OT/PT consult to assist with strengthening/mobility  - Encourage toileting schedule  Outcome: Progressing     Problem: DISCHARGE PLANNING  Goal: Discharge to home or other facility with appropriate resources  Description: INTERVENTIONS:  - Identify barriers to discharge w/pt and caregiver  - Include patient/family/discharge partner in discharge planning  - Arrange for needed discharge resources and transportation as appropriate  - Identify discharge learning needs (meds, wound care, etc)  - Arrange for interpreters to assist at discharge as needed  - Consider post-discharge preferences of patient/family/discharge partner  - Complete POLST form as appropriate  - Assess patient's ability to be responsible for managing their own health  - Refer to Case Management Department for coordinating discharge planning if the patient needs post-hospital services based on physician/LIP order or complex needs related to functional status, cognitive ability or social support system  Outcome: Progressing   Patient received from ED to room 514. Patient arrives a/ox4, in minimal distress, continues to have mild shortness of breath, more with exertion.  Patient oriented to environment, plan of care discussed with patient, safety precautions in place

## 2023-12-05 NOTE — ED INITIAL ASSESSMENT (HPI)
Patient presents with cough and shortness of breath since last night. Patient diagnosed with RSV 11/30/2023.

## 2023-12-05 NOTE — ED QUICK NOTES
Orders for admission, patient is aware of plan and ready to go upstairs. Any questions, please call ED RN Willow  at extension [phone] . Type of COVID test sent:Genexpert  COVID Suspicion level: Low. Negative . +RSV droplet precautions remain in place. Titratable drug(s) infusing:  Rate:NA    LOC at time of transport:A/O X4    Other pertinent information. Uses a walker at home. Not on O2 at home or here.      CIWA score=0  NIH score=0

## 2023-12-06 LAB
ANION GAP SERPL CALC-SCNC: 5 MMOL/L (ref 0–18)
BASOPHILS # BLD AUTO: 0.01 X10(3) UL (ref 0–0.2)
BASOPHILS NFR BLD AUTO: 0.1 %
BUN BLD-MCNC: 26 MG/DL (ref 9–23)
BUN/CREAT SERPL: 24.1 (ref 10–20)
CALCIUM BLD-MCNC: 9.3 MG/DL (ref 8.7–10.4)
CHLORIDE SERPL-SCNC: 107 MMOL/L (ref 98–112)
CO2 SERPL-SCNC: 30 MMOL/L (ref 21–32)
CREAT BLD-MCNC: 1.08 MG/DL
DEPRECATED RDW RBC AUTO: 42 FL (ref 35.1–46.3)
EGFRCR SERPLBLD CKD-EPI 2021: 49 ML/MIN/1.73M2 (ref 60–?)
EOSINOPHIL # BLD AUTO: 0 X10(3) UL (ref 0–0.7)
EOSINOPHIL NFR BLD AUTO: 0 %
ERYTHROCYTE [DISTWIDTH] IN BLOOD BY AUTOMATED COUNT: 12.8 % (ref 11–15)
GLUCOSE BLD-MCNC: 119 MG/DL (ref 70–99)
HCT VFR BLD AUTO: 36.6 %
HGB BLD-MCNC: 12.2 G/DL
IMM GRANULOCYTES # BLD AUTO: 0.02 X10(3) UL (ref 0–1)
IMM GRANULOCYTES NFR BLD: 0.3 %
LYMPHOCYTES # BLD AUTO: 1.44 X10(3) UL (ref 1–4)
LYMPHOCYTES NFR BLD AUTO: 18.9 %
MCH RBC QN AUTO: 30.1 PG (ref 26–34)
MCHC RBC AUTO-ENTMCNC: 33.3 G/DL (ref 31–37)
MCV RBC AUTO: 90.4 FL
MONOCYTES # BLD AUTO: 0.63 X10(3) UL (ref 0.1–1)
MONOCYTES NFR BLD AUTO: 8.3 %
NEUTROPHILS # BLD AUTO: 5.5 X10 (3) UL (ref 1.5–7.7)
NEUTROPHILS # BLD AUTO: 5.5 X10(3) UL (ref 1.5–7.7)
NEUTROPHILS NFR BLD AUTO: 72.4 %
OSMOLALITY SERPL CALC.SUM OF ELEC: 300 MOSM/KG (ref 275–295)
PLATELET # BLD AUTO: 170 10(3)UL (ref 150–450)
POTASSIUM SERPL-SCNC: 3.7 MMOL/L (ref 3.5–5.1)
POTASSIUM SERPL-SCNC: 3.7 MMOL/L (ref 3.5–5.1)
RBC # BLD AUTO: 4.05 X10(6)UL
SODIUM SERPL-SCNC: 142 MMOL/L (ref 136–145)
WBC # BLD AUTO: 7.6 X10(3) UL (ref 4–11)

## 2023-12-06 PROCEDURE — 99233 SBSQ HOSP IP/OBS HIGH 50: CPT | Performed by: HOSPITALIST

## 2023-12-06 RX ORDER — BENZONATATE 200 MG/1
200 CAPSULE ORAL 3 TIMES DAILY PRN
Status: DISCONTINUED | OUTPATIENT
Start: 2023-12-06 | End: 2023-12-08

## 2023-12-06 RX ORDER — BENZONATATE 100 MG/1
100 CAPSULE ORAL 3 TIMES DAILY PRN
Status: DISCONTINUED | OUTPATIENT
Start: 2023-12-06 | End: 2023-12-06

## 2023-12-06 NOTE — PHYSICAL THERAPY NOTE
PT order received, chart reviewed. Spoke with RN Abraham Arellano who reports patient has been up to the bathroom with staff. Attempted to see patient who is lying in recumbent position eating her breakfast and coughing. Encouragement and education provided to patient that she should NOT eat in bed and should be sitting EOB or up to chair to eat. Pt reports she was up all day yesterday and doesn't want to get up because she still doesn't feel good. Education about aspiration and eating and attempted to assist pt to more upright posture and she is not agreeable stating she has been trying to sit more upright but she slides down. Reinforced that UP TO CHAIR for meals is an excellent goal and pt admits she does not eat in bed at home. However, pt is adamantly refusing to get out of bed at this time to sit in chair to eat or walk in room. \"I'm not trying to give you a hard time, I just am not going to do it right now\". Attempted to plan a return time of 1:00 and pt replies \"I may get up before then but I'm not getting up now\". Education about rationale for PT and discharge planning and pt reports she will work with therapy at some point but just not now. RN is aware.

## 2023-12-06 NOTE — PLAN OF CARE
Problem: Patient Centered Care  Goal: Patient preferences are identified and integrated in the patient's plan of care  Description: Interventions:  - What would you like us to know as we care for you? I live at home alone  - Provide timely, complete, and accurate information to patient/family  - Incorporate patient and family knowledge, values, beliefs, and cultural backgrounds into the planning and delivery of care  - Encourage patient/family to participate in care and decision-making at the level they choose  - Honor patient and family perspectives and choices  Outcome: Progressing     Problem: Patient/Family Goals  Goal: Patient/Family Long Term Goal  Description: Patient's Long Term Goal: Resolve cough    Interventions:  - Administer antitussives as needed  - Monitor vital signs  - encourage adequate hydration  - See additional Care Plan goals for specific interventions  Outcome: Progressing  Goal: Patient/Family Short Term Goal  Description: Patient's Short Term Goal: resolve shortness of breath    Interventions:   - Monitor respiratory status  - monitor vital signs  - administer oxygen as needed  - See additional Care Plan goals for specific interventions  Outcome: Progressing     Problem: CARDIOVASCULAR - ADULT  Goal: Maintains optimal cardiac output and hemodynamic stability  Description: INTERVENTIONS:  - Monitor vital signs, rhythm, and trends  - Monitor for bleeding, hypotension and signs of decreased cardiac output  - Evaluate effectiveness of vasoactive medications to optimize hemodynamic stability  - Monitor arterial and/or venous puncture sites for bleeding and/or hematoma  - Assess quality of pulses, skin color and temperature  - Assess for signs of decreased coronary artery perfusion - ex.  Angina  - Evaluate fluid balance, assess for edema, trend weights  Outcome: Progressing  Goal: Absence of cardiac arrhythmias or at baseline  Description: INTERVENTIONS:  - Continuous cardiac monitoring, monitor vital signs, obtain 12 lead EKG if indicated  - Evaluate effectiveness of antiarrhythmic and heart rate control medications as ordered  - Initiate emergency measures for life threatening arrhythmias  - Monitor electrolytes and administer replacement therapy as ordered  Outcome: Progressing     Problem: RESPIRATORY - ADULT  Goal: Achieves optimal ventilation and oxygenation  Description: INTERVENTIONS:  - Assess for changes in respiratory status  - Assess for changes in mentation and behavior  - Position to facilitate oxygenation and minimize respiratory effort  - Oxygen supplementation based on oxygen saturation or ABGs  - Provide Smoking Cessation handout, if applicable  - Encourage broncho-pulmonary hygiene including cough, deep breathe, Incentive Spirometry  - Assess the need for suctioning and perform as needed  - Assess and instruct to report SOB or any respiratory difficulty  - Respiratory Therapy support as indicated  - Manage/alleviate anxiety  - Monitor for signs/symptoms of CO2 retention  Outcome: Progressing     Problem: METABOLIC/FLUID AND ELECTROLYTES - ADULT  Goal: Glucose maintained within prescribed range  Description: INTERVENTIONS:  - Monitor Blood Glucose as ordered  - Assess for signs and symptoms of hyperglycemia and hypoglycemia  - Administer ordered medications to maintain glucose within target range  - Assess barriers to adequate nutritional intake and initiate nutrition consult as needed  - Instruct patient on self management of diabetes  Outcome: Progressing  Goal: Electrolytes maintained within normal limits  Description: INTERVENTIONS:  - Monitor labs and rhythm and assess patient for signs and symptoms of electrolyte imbalances  - Administer electrolyte replacement as ordered  - Monitor response to electrolyte replacements, including rhythm and repeat lab results as appropriate  - Fluid restriction as ordered  - Instruct patient on fluid and nutrition restrictions as appropriate  Outcome: Progressing  Goal: Hemodynamic stability and optimal renal function maintained  Description: INTERVENTIONS:  - Monitor labs and assess for signs and symptoms of volume excess or deficit  - Monitor intake, output and patient weight  - Monitor urine specific gravity, serum osmolarity and serum sodium as indicated or ordered  - Monitor response to interventions for patient's volume status, including labs, urine output, blood pressure (other measures as available)  - Encourage oral intake as appropriate  - Instruct patient on fluid and nutrition restrictions as appropriate  Outcome: Progressing     Problem: SKIN/TISSUE INTEGRITY - ADULT  Goal: Skin integrity remains intact  Description: INTERVENTIONS  - Assess and document risk factors for pressure ulcer development  - Assess and document skin integrity  - Monitor for areas of redness and/or skin breakdown  - Initiate interventions, skin care algorithm/standards of care as needed  Outcome: Progressing     Problem: MUSCULOSKELETAL - ADULT  Goal: Return mobility to safest level of function  Description: INTERVENTIONS:  - Assess patient stability and activity tolerance for standing, transferring and ambulating w/ or w/o assistive devices  - Assist with transfers and ambulation using safe patient handling equipment as needed  - Ensure adequate protection for wounds/incisions during mobilization  - Obtain PT/OT consults as needed  - Advance activity as appropriate  - Communicate ordered activity level and limitations with patient/family  Outcome: Progressing  Goal: Maintain proper alignment of affected body part  Description: INTERVENTIONS:  - Support and protect limb and body alignment per provider's orders  - Instruct and reinforce with patient and family use of appropriate assistive device and precautions (e.g. spinal or hip dislocation precautions)  Outcome: Progressing     Problem: PAIN - ADULT  Goal: Verbalizes/displays adequate comfort level or patient's stated pain goal  Description: INTERVENTIONS:  - Encourage pt to monitor pain and request assistance  - Assess pain using appropriate pain scale  - Administer analgesics based on type and severity of pain and evaluate response  - Implement non-pharmacological measures as appropriate and evaluate response  - Consider cultural and social influences on pain and pain management  - Manage/alleviate anxiety  - Utilize distraction and/or relaxation techniques  - Monitor for opioid side effects  - Notify MD/LIP if interventions unsuccessful or patient reports new pain  - Anticipate increased pain with activity and pre-medicate as appropriate  Outcome: Progressing     Problem: SAFETY ADULT - FALL  Goal: Free from fall injury  Description: INTERVENTIONS:  - Assess pt frequently for physical needs  - Identify cognitive and physical deficits and behaviors that affect risk of falls.   - [place] fall precautions as indicated by assessment.  - Educate pt/family on patient safety including physical limitations  - Instruct pt to call for assistance with activity based on assessment  - Modify environment to reduce risk of injury  - Provide assistive devices as appropriate  - Consider OT/PT consult to assist with strengthening/mobility  - Encourage toileting schedule  Outcome: Progressing     Problem: DISCHARGE PLANNING  Goal: Discharge to home or other facility with appropriate resources  Description: INTERVENTIONS:  - Identify barriers to discharge w/pt and caregiver  - Include patient/family/discharge partner in discharge planning  - Arrange for needed discharge resources and transportation as appropriate  - Identify discharge learning needs (meds, wound care, etc)  - Arrange for interpreters to assist at discharge as needed  - Consider post-discharge preferences of patient/family/discharge partner  - Complete POLST form as appropriate  - Assess patient's ability to be responsible for managing their own health  - Refer to Case Management Department for coordinating discharge planning if the patient needs post-hospital services based on physician/LIP order or complex needs related to functional status, cognitive ability or social support system  Outcome: Progressing   No acute changes, patient denies shortness of breath, denies pain.  Plan of care reviewed, safety precautions in place

## 2023-12-07 ENCOUNTER — APPOINTMENT (OUTPATIENT)
Dept: GENERAL RADIOLOGY | Facility: HOSPITAL | Age: 88
End: 2023-12-07
Attending: HOSPITALIST
Payer: MEDICARE

## 2023-12-07 LAB
ANION GAP SERPL CALC-SCNC: 5 MMOL/L (ref 0–18)
BASOPHILS # BLD AUTO: 0.03 X10(3) UL (ref 0–0.2)
BASOPHILS NFR BLD AUTO: 0.4 %
BUN BLD-MCNC: 31 MG/DL (ref 9–23)
BUN/CREAT SERPL: 28.4 (ref 10–20)
CALCIUM BLD-MCNC: 8.5 MG/DL (ref 8.7–10.4)
CHLORIDE SERPL-SCNC: 108 MMOL/L (ref 98–112)
CO2 SERPL-SCNC: 31 MMOL/L (ref 21–32)
CREAT BLD-MCNC: 1.09 MG/DL
DEPRECATED RDW RBC AUTO: 42.5 FL (ref 35.1–46.3)
EGFRCR SERPLBLD CKD-EPI 2021: 48 ML/MIN/1.73M2 (ref 60–?)
EOSINOPHIL # BLD AUTO: 0.05 X10(3) UL (ref 0–0.7)
EOSINOPHIL NFR BLD AUTO: 0.7 %
ERYTHROCYTE [DISTWIDTH] IN BLOOD BY AUTOMATED COUNT: 12.8 % (ref 11–15)
GLUCOSE BLD-MCNC: 88 MG/DL (ref 70–99)
HCT VFR BLD AUTO: 36.2 %
HGB BLD-MCNC: 12 G/DL
IMM GRANULOCYTES # BLD AUTO: 0.01 X10(3) UL (ref 0–1)
IMM GRANULOCYTES NFR BLD: 0.1 %
LYMPHOCYTES # BLD AUTO: 3.11 X10(3) UL (ref 1–4)
LYMPHOCYTES NFR BLD AUTO: 41.5 %
MCH RBC QN AUTO: 29.9 PG (ref 26–34)
MCHC RBC AUTO-ENTMCNC: 33.1 G/DL (ref 31–37)
MCV RBC AUTO: 90 FL
MONOCYTES # BLD AUTO: 0.69 X10(3) UL (ref 0.1–1)
MONOCYTES NFR BLD AUTO: 9.2 %
NEUTROPHILS # BLD AUTO: 3.61 X10 (3) UL (ref 1.5–7.7)
NEUTROPHILS # BLD AUTO: 3.61 X10(3) UL (ref 1.5–7.7)
NEUTROPHILS NFR BLD AUTO: 48.1 %
OSMOLALITY SERPL CALC.SUM OF ELEC: 304 MOSM/KG (ref 275–295)
PLATELET # BLD AUTO: 176 10(3)UL (ref 150–450)
POTASSIUM SERPL-SCNC: 3.3 MMOL/L (ref 3.5–5.1)
RBC # BLD AUTO: 4.02 X10(6)UL
SODIUM SERPL-SCNC: 144 MMOL/L (ref 136–145)
WBC # BLD AUTO: 7.5 X10(3) UL (ref 4–11)

## 2023-12-07 PROCEDURE — 71045 X-RAY EXAM CHEST 1 VIEW: CPT | Performed by: HOSPITALIST

## 2023-12-07 PROCEDURE — 99233 SBSQ HOSP IP/OBS HIGH 50: CPT | Performed by: HOSPITALIST

## 2023-12-07 RX ORDER — POTASSIUM CHLORIDE 1.5 G/1.58G
40 POWDER, FOR SOLUTION ORAL ONCE
Status: COMPLETED | OUTPATIENT
Start: 2023-12-07 | End: 2023-12-07

## 2023-12-07 RX ORDER — METHYLPREDNISOLONE SODIUM SUCCINATE 40 MG/ML
40 INJECTION, POWDER, LYOPHILIZED, FOR SOLUTION INTRAMUSCULAR; INTRAVENOUS EVERY 12 HOURS
Status: DISCONTINUED | OUTPATIENT
Start: 2023-12-07 | End: 2023-12-08

## 2023-12-07 NOTE — PLAN OF CARE
Patient is Aox4, vitals stable on room air and meds given as ordered. No complaints of pain noted. Noted patient with some cough, prn cough medicine given. Frequent rounding done on pt and needs attended to. Problem: Patient Centered Care  Goal: Patient preferences are identified and integrated in the patient's plan of care  Description: Interventions:  - What would you like us to know as we care for you? I live at home alone  - Provide timely, complete, and accurate information to patient/family  - Incorporate patient and family knowledge, values, beliefs, and cultural backgrounds into the planning and delivery of care  - Encourage patient/family to participate in care and decision-making at the level they choose  - Honor patient and family perspectives and choices  Outcome: Progressing     Problem: Patient/Family Goals  Goal: Patient/Family Long Term Goal  Description: Patient's Long Term Goal: Resolve cough    Interventions:  - Administer antitussives as needed  - Monitor vital signs  - encourage adequate hydration  - See additional Care Plan goals for specific interventions  Outcome: Progressing  Goal: Patient/Family Short Term Goal  Description: Patient's Short Term Goal: resolve shortness of breath    Interventions:   - Monitor respiratory status  - monitor vital signs  - administer oxygen as needed  - See additional Care Plan goals for specific interventions  Outcome: Progressing     Problem: CARDIOVASCULAR - ADULT  Goal: Maintains optimal cardiac output and hemodynamic stability  Description: INTERVENTIONS:  - Monitor vital signs, rhythm, and trends  - Monitor for bleeding, hypotension and signs of decreased cardiac output  - Evaluate effectiveness of vasoactive medications to optimize hemodynamic stability  - Monitor arterial and/or venous puncture sites for bleeding and/or hematoma  - Assess quality of pulses, skin color and temperature  - Assess for signs of decreased coronary artery perfusion - ex. Angina  - Evaluate fluid balance, assess for edema, trend weights  Outcome: Progressing  Goal: Absence of cardiac arrhythmias or at baseline  Description: INTERVENTIONS:  - Continuous cardiac monitoring, monitor vital signs, obtain 12 lead EKG if indicated  - Evaluate effectiveness of antiarrhythmic and heart rate control medications as ordered  - Initiate emergency measures for life threatening arrhythmias  - Monitor electrolytes and administer replacement therapy as ordered  Outcome: Progressing     Problem: RESPIRATORY - ADULT  Goal: Achieves optimal ventilation and oxygenation  Description: INTERVENTIONS:  - Assess for changes in respiratory status  - Assess for changes in mentation and behavior  - Position to facilitate oxygenation and minimize respiratory effort  - Oxygen supplementation based on oxygen saturation or ABGs  - Provide Smoking Cessation handout, if applicable  - Encourage broncho-pulmonary hygiene including cough, deep breathe, Incentive Spirometry  - Assess the need for suctioning and perform as needed  - Assess and instruct to report SOB or any respiratory difficulty  - Respiratory Therapy support as indicated  - Manage/alleviate anxiety  - Monitor for signs/symptoms of CO2 retention  Outcome: Progressing     Problem: METABOLIC/FLUID AND ELECTROLYTES - ADULT  Goal: Glucose maintained within prescribed range  Description: INTERVENTIONS:  - Monitor Blood Glucose as ordered  - Assess for signs and symptoms of hyperglycemia and hypoglycemia  - Administer ordered medications to maintain glucose within target range  - Assess barriers to adequate nutritional intake and initiate nutrition consult as needed  - Instruct patient on self management of diabetes  Outcome: Progressing  Goal: Electrolytes maintained within normal limits  Description: INTERVENTIONS:  - Monitor labs and rhythm and assess patient for signs and symptoms of electrolyte imbalances  - Administer electrolyte replacement as ordered  - Monitor response to electrolyte replacements, including rhythm and repeat lab results as appropriate  - Fluid restriction as ordered  - Instruct patient on fluid and nutrition restrictions as appropriate  Outcome: Progressing  Goal: Hemodynamic stability and optimal renal function maintained  Description: INTERVENTIONS:  - Monitor labs and assess for signs and symptoms of volume excess or deficit  - Monitor intake, output and patient weight  - Monitor urine specific gravity, serum osmolarity and serum sodium as indicated or ordered  - Monitor response to interventions for patient's volume status, including labs, urine output, blood pressure (other measures as available)  - Encourage oral intake as appropriate  - Instruct patient on fluid and nutrition restrictions as appropriate  Outcome: Progressing     Problem: SKIN/TISSUE INTEGRITY - ADULT  Goal: Skin integrity remains intact  Description: INTERVENTIONS  - Assess and document risk factors for pressure ulcer development  - Assess and document skin integrity  - Monitor for areas of redness and/or skin breakdown  - Initiate interventions, skin care algorithm/standards of care as needed  Outcome: Progressing     Problem: MUSCULOSKELETAL - ADULT  Goal: Return mobility to safest level of function  Description: INTERVENTIONS:  - Assess patient stability and activity tolerance for standing, transferring and ambulating w/ or w/o assistive devices  - Assist with transfers and ambulation using safe patient handling equipment as needed  - Ensure adequate protection for wounds/incisions during mobilization  - Obtain PT/OT consults as needed  - Advance activity as appropriate  - Communicate ordered activity level and limitations with patient/family  Outcome: Progressing  Goal: Maintain proper alignment of affected body part  Description: INTERVENTIONS:  - Support and protect limb and body alignment per provider's orders  - Instruct and reinforce with patient and family use of appropriate assistive device and precautions (e.g. spinal or hip dislocation precautions)  Outcome: Progressing     Problem: PAIN - ADULT  Goal: Verbalizes/displays adequate comfort level or patient's stated pain goal  Description: INTERVENTIONS:  - Encourage pt to monitor pain and request assistance  - Assess pain using appropriate pain scale  - Administer analgesics based on type and severity of pain and evaluate response  - Implement non-pharmacological measures as appropriate and evaluate response  - Consider cultural and social influences on pain and pain management  - Manage/alleviate anxiety  - Utilize distraction and/or relaxation techniques  - Monitor for opioid side effects  - Notify MD/LIP if interventions unsuccessful or patient reports new pain  - Anticipate increased pain with activity and pre-medicate as appropriate  Outcome: Progressing     Problem: SAFETY ADULT - FALL  Goal: Free from fall injury  Description: INTERVENTIONS:  - Assess pt frequently for physical needs  - Identify cognitive and physical deficits and behaviors that affect risk of falls.   - Normal fall precautions as indicated by assessment.  - Educate pt/family on patient safety including physical limitations  - Instruct pt to call for assistance with activity based on assessment  - Modify environment to reduce risk of injury  - Provide assistive devices as appropriate  - Consider OT/PT consult to assist with strengthening/mobility  - Encourage toileting schedule  Outcome: Progressing     Problem: DISCHARGE PLANNING  Goal: Discharge to home or other facility with appropriate resources  Description: INTERVENTIONS:  - Identify barriers to discharge w/pt and caregiver  - Include patient/family/discharge partner in discharge planning  - Arrange for needed discharge resources and transportation as appropriate  - Identify discharge learning needs (meds, wound care, etc)  - Arrange for interpreters to assist at discharge as needed  - Consider post-discharge preferences of patient/family/discharge partner  - Complete POLST form as appropriate  - Assess patient's ability to be responsible for managing their own health  - Refer to Case Management Department for coordinating discharge planning if the patient needs post-hospital services based on physician/LIP order or complex needs related to functional status, cognitive ability or social support system  Outcome: Progressing

## 2023-12-07 NOTE — PLAN OF CARE
No acute changes at this time, patient currently resting in bed. No reports of pain or discomfort. Problem: Patient Centered Care  Goal: Patient preferences are identified and integrated in the patient's plan of care  Description: Interventions:  - What would you like us to know as we care for you? I live at home alone  - Provide timely, complete, and accurate information to patient/family  - Incorporate patient and family knowledge, values, beliefs, and cultural backgrounds into the planning and delivery of care  - Encourage patient/family to participate in care and decision-making at the level they choose  - Honor patient and family perspectives and choices  Outcome: Progressing     Problem: Patient/Family Goals  Goal: Patient/Family Long Term Goal  Description: Patient's Long Term Goal: Resolve cough    Interventions:  - Administer antitussives as needed  - Monitor vital signs  - encourage adequate hydration  - See additional Care Plan goals for specific interventions  Outcome: Progressing  Goal: Patient/Family Short Term Goal  Description: Patient's Short Term Goal: resolve shortness of breath    Interventions:   - Monitor respiratory status  - monitor vital signs  - administer oxygen as needed  - See additional Care Plan goals for specific interventions  Outcome: Progressing     Problem: CARDIOVASCULAR - ADULT  Goal: Maintains optimal cardiac output and hemodynamic stability  Description: INTERVENTIONS:  - Monitor vital signs, rhythm, and trends  - Monitor for bleeding, hypotension and signs of decreased cardiac output  - Evaluate effectiveness of vasoactive medications to optimize hemodynamic stability  - Monitor arterial and/or venous puncture sites for bleeding and/or hematoma  - Assess quality of pulses, skin color and temperature  - Assess for signs of decreased coronary artery perfusion - ex.  Angina  - Evaluate fluid balance, assess for edema, trend weights  Outcome: Progressing  Goal: Absence of cardiac arrhythmias or at baseline  Description: INTERVENTIONS:  - Continuous cardiac monitoring, monitor vital signs, obtain 12 lead EKG if indicated  - Evaluate effectiveness of antiarrhythmic and heart rate control medications as ordered  - Initiate emergency measures for life threatening arrhythmias  - Monitor electrolytes and administer replacement therapy as ordered  Outcome: Progressing     Problem: RESPIRATORY - ADULT  Goal: Achieves optimal ventilation and oxygenation  Description: INTERVENTIONS:  - Assess for changes in respiratory status  - Assess for changes in mentation and behavior  - Position to facilitate oxygenation and minimize respiratory effort  - Oxygen supplementation based on oxygen saturation or ABGs  - Provide Smoking Cessation handout, if applicable  - Encourage broncho-pulmonary hygiene including cough, deep breathe, Incentive Spirometry  - Assess the need for suctioning and perform as needed  - Assess and instruct to report SOB or any respiratory difficulty  - Respiratory Therapy support as indicated  - Manage/alleviate anxiety  - Monitor for signs/symptoms of CO2 retention  Outcome: Progressing     Problem: METABOLIC/FLUID AND ELECTROLYTES - ADULT  Goal: Glucose maintained within prescribed range  Description: INTERVENTIONS:  - Monitor Blood Glucose as ordered  - Assess for signs and symptoms of hyperglycemia and hypoglycemia  - Administer ordered medications to maintain glucose within target range  - Assess barriers to adequate nutritional intake and initiate nutrition consult as needed  - Instruct patient on self management of diabetes  Outcome: Progressing  Goal: Electrolytes maintained within normal limits  Description: INTERVENTIONS:  - Monitor labs and rhythm and assess patient for signs and symptoms of electrolyte imbalances  - Administer electrolyte replacement as ordered  - Monitor response to electrolyte replacements, including rhythm and repeat lab results as appropriate  - Fluid restriction as ordered  - Instruct patient on fluid and nutrition restrictions as appropriate  Outcome: Progressing  Goal: Hemodynamic stability and optimal renal function maintained  Description: INTERVENTIONS:  - Monitor labs and assess for signs and symptoms of volume excess or deficit  - Monitor intake, output and patient weight  - Monitor urine specific gravity, serum osmolarity and serum sodium as indicated or ordered  - Monitor response to interventions for patient's volume status, including labs, urine output, blood pressure (other measures as available)  - Encourage oral intake as appropriate  - Instruct patient on fluid and nutrition restrictions as appropriate  Outcome: Progressing     Problem: SKIN/TISSUE INTEGRITY - ADULT  Goal: Skin integrity remains intact  Description: INTERVENTIONS  - Assess and document risk factors for pressure ulcer development  - Assess and document skin integrity  - Monitor for areas of redness and/or skin breakdown  - Initiate interventions, skin care algorithm/standards of care as needed  Outcome: Progressing     Problem: MUSCULOSKELETAL - ADULT  Goal: Return mobility to safest level of function  Description: INTERVENTIONS:  - Assess patient stability and activity tolerance for standing, transferring and ambulating w/ or w/o assistive devices  - Assist with transfers and ambulation using safe patient handling equipment as needed  - Ensure adequate protection for wounds/incisions during mobilization  - Obtain PT/OT consults as needed  - Advance activity as appropriate  - Communicate ordered activity level and limitations with patient/family  Outcome: Progressing  Goal: Maintain proper alignment of affected body part  Description: INTERVENTIONS:  - Support and protect limb and body alignment per provider's orders  - Instruct and reinforce with patient and family use of appropriate assistive device and precautions (e.g. spinal or hip dislocation precautions)  Outcome: Progressing     Problem: PAIN - ADULT  Goal: Verbalizes/displays adequate comfort level or patient's stated pain goal  Description: INTERVENTIONS:  - Encourage pt to monitor pain and request assistance  - Assess pain using appropriate pain scale  - Administer analgesics based on type and severity of pain and evaluate response  - Implement non-pharmacological measures as appropriate and evaluate response  - Consider cultural and social influences on pain and pain management  - Manage/alleviate anxiety  - Utilize distraction and/or relaxation techniques  - Monitor for opioid side effects  - Notify MD/LIP if interventions unsuccessful or patient reports new pain  - Anticipate increased pain with activity and pre-medicate as appropriate  Outcome: Progressing     Problem: SAFETY ADULT - FALL  Goal: Free from fall injury  Description: INTERVENTIONS:  - Assess pt frequently for physical needs  - Identify cognitive and physical deficits and behaviors that affect risk of falls.   - [place] fall precautions as indicated by assessment.  - Educate pt/family on patient safety including physical limitations  - Instruct pt to call for assistance with activity based on assessment  - Modify environment to reduce risk of injury  - Provide assistive devices as appropriate  - Consider OT/PT consult to assist with strengthening/mobility  - Encourage toileting schedule  Outcome: Progressing     Problem: DISCHARGE PLANNING  Goal: Discharge to home or other facility with appropriate resources  Description: INTERVENTIONS:  - Identify barriers to discharge w/pt and caregiver  - Include patient/family/discharge partner in discharge planning  - Arrange for needed discharge resources and transportation as appropriate  - Identify discharge learning needs (meds, wound care, etc)  - Arrange for interpreters to assist at discharge as needed  - Consider post-discharge preferences of patient/family/discharge partner  - Complete POLST form as appropriate  - Assess patient's ability to be responsible for managing their own health  - Refer to Case Management Department for coordinating discharge planning if the patient needs post-hospital services based on physician/LIP order or complex needs related to functional status, cognitive ability or social support system  Outcome: Progressing

## 2023-12-07 NOTE — PROGRESS NOTES
12/07/23 1344   Mobility   O2 walk?  Yes   SPO2% on Room Air at Rest 95   At rest oxygen flow (liters per minute) 0   SPO2% Ambulation on Room Air 91   Ambulation oxygen flow (liters per minute) 0   Comfort and Environment Interventions   Comfort Diaper changed;Bed pad changed

## 2023-12-07 NOTE — PHYSICAL THERAPY NOTE
PT follow-up attempted. RN approved activity. Patient resting in bed upon arrival, declining to participate in therapeutic interventions at this time. Patient reporting that she recently ambulated in the halls with nursing staff. Reports that she has been up in the room on her own as needed.  RN aware of refusal.     Trell Wild, PT, DPT  [facility]  Ext: [phone]

## 2023-12-08 VITALS
WEIGHT: 209.19 LBS | HEART RATE: 64 BPM | DIASTOLIC BLOOD PRESSURE: 70 MMHG | SYSTOLIC BLOOD PRESSURE: 127 MMHG | TEMPERATURE: 98 F | BODY MASS INDEX: 36 KG/M2 | OXYGEN SATURATION: 94 % | RESPIRATION RATE: 18 BRPM

## 2023-12-08 LAB
ANION GAP SERPL CALC-SCNC: 6 MMOL/L (ref 0–18)
BASOPHILS # BLD AUTO: 0 X10(3) UL (ref 0–0.2)
BASOPHILS NFR BLD AUTO: 0 %
BUN BLD-MCNC: 29 MG/DL (ref 9–23)
BUN/CREAT SERPL: 26.6 (ref 10–20)
CALCIUM BLD-MCNC: 9.2 MG/DL (ref 8.7–10.4)
CHLORIDE SERPL-SCNC: 107 MMOL/L (ref 98–112)
CO2 SERPL-SCNC: 30 MMOL/L (ref 21–32)
CREAT BLD-MCNC: 1.09 MG/DL
DEPRECATED RDW RBC AUTO: 41.6 FL (ref 35.1–46.3)
EGFRCR SERPLBLD CKD-EPI 2021: 48 ML/MIN/1.73M2 (ref 60–?)
EOSINOPHIL # BLD AUTO: 0 X10(3) UL (ref 0–0.7)
EOSINOPHIL NFR BLD AUTO: 0 %
ERYTHROCYTE [DISTWIDTH] IN BLOOD BY AUTOMATED COUNT: 12.7 % (ref 11–15)
GLUCOSE BLD-MCNC: 162 MG/DL (ref 70–99)
HCT VFR BLD AUTO: 38.3 %
HGB BLD-MCNC: 12.8 G/DL
IMM GRANULOCYTES # BLD AUTO: 0.02 X10(3) UL (ref 0–1)
IMM GRANULOCYTES NFR BLD: 0.4 %
LYMPHOCYTES # BLD AUTO: 0.87 X10(3) UL (ref 1–4)
LYMPHOCYTES NFR BLD AUTO: 16.3 %
MCH RBC QN AUTO: 29.8 PG (ref 26–34)
MCHC RBC AUTO-ENTMCNC: 33.4 G/DL (ref 31–37)
MCV RBC AUTO: 89.3 FL
MONOCYTES # BLD AUTO: 0.06 X10(3) UL (ref 0.1–1)
MONOCYTES NFR BLD AUTO: 1.1 %
NEUTROPHILS # BLD AUTO: 4.4 X10 (3) UL (ref 1.5–7.7)
NEUTROPHILS # BLD AUTO: 4.4 X10(3) UL (ref 1.5–7.7)
NEUTROPHILS NFR BLD AUTO: 82.2 %
OSMOLALITY SERPL CALC.SUM OF ELEC: 305 MOSM/KG (ref 275–295)
PHOSPHATE SERPL-MCNC: 2.8 MG/DL (ref 2.4–5.1)
PLATELET # BLD AUTO: 182 10(3)UL (ref 150–450)
POTASSIUM SERPL-SCNC: 3.7 MMOL/L (ref 3.5–5.1)
POTASSIUM SERPL-SCNC: 3.7 MMOL/L (ref 3.5–5.1)
RBC # BLD AUTO: 4.29 X10(6)UL
SODIUM SERPL-SCNC: 143 MMOL/L (ref 136–145)
WBC # BLD AUTO: 5.4 X10(3) UL (ref 4–11)

## 2023-12-08 PROCEDURE — 99239 HOSP IP/OBS DSCHRG MGMT >30: CPT | Performed by: HOSPITALIST

## 2023-12-08 RX ORDER — BENZONATATE 200 MG/1
200 CAPSULE ORAL 3 TIMES DAILY PRN
Qty: 20 CAPSULE | Refills: 0 | Status: SHIPPED | OUTPATIENT
Start: 2023-12-08

## 2023-12-08 RX ORDER — ALBUTEROL SULFATE 90 UG/1
2 AEROSOL, METERED RESPIRATORY (INHALATION) EVERY 4 HOURS PRN
Qty: 1 EACH | Refills: 0 | Status: SHIPPED | OUTPATIENT
Start: 2023-12-08

## 2023-12-08 NOTE — PLAN OF CARE
Problem: Patient Centered Care  Goal: Patient preferences are identified and integrated in the patient's plan of care  Description: Interventions:  - What would you like us to know as we care for you? I live at home alone  - Provide timely, complete, and accurate information to patient/family  - Incorporate patient and family knowledge, values, beliefs, and cultural backgrounds into the planning and delivery of care  - Encourage patient/family to participate in care and decision-making at the level they choose  - Honor patient and family perspectives and choices  Outcome: Progressing     Problem: Patient/Family Goals  Goal: Patient/Family Long Term Goal  Description: Patient's Long Term Goal: Resolve cough    Interventions:  - Administer antitussives as needed  - Monitor vital signs  - encourage adequate hydration  - See additional Care Plan goals for specific interventions  Outcome: Progressing  Goal: Patient/Family Short Term Goal  Description: Patient's Short Term Goal: resolve shortness of breath    Interventions:   - Monitor respiratory status  - monitor vital signs  - administer oxygen as needed  - See additional Care Plan goals for specific interventions  Outcome: Progressing     Problem: CARDIOVASCULAR - ADULT  Goal: Maintains optimal cardiac output and hemodynamic stability  Description: INTERVENTIONS:  - Monitor vital signs, rhythm, and trends  - Monitor for bleeding, hypotension and signs of decreased cardiac output  - Evaluate effectiveness of vasoactive medications to optimize hemodynamic stability  - Monitor arterial and/or venous puncture sites for bleeding and/or hematoma  - Assess quality of pulses, skin color and temperature  - Assess for signs of decreased coronary artery perfusion - ex.  Angina  - Evaluate fluid balance, assess for edema, trend weights  Outcome: Progressing  Goal: Absence of cardiac arrhythmias or at baseline  Description: INTERVENTIONS:  - Continuous cardiac monitoring, monitor vital signs, obtain 12 lead EKG if indicated  - Evaluate effectiveness of antiarrhythmic and heart rate control medications as ordered  - Initiate emergency measures for life threatening arrhythmias  - Monitor electrolytes and administer replacement therapy as ordered  Outcome: Progressing     Problem: RESPIRATORY - ADULT  Goal: Achieves optimal ventilation and oxygenation  Description: INTERVENTIONS:  - Assess for changes in respiratory status  - Assess for changes in mentation and behavior  - Position to facilitate oxygenation and minimize respiratory effort  - Oxygen supplementation based on oxygen saturation or ABGs  - Provide Smoking Cessation handout, if applicable  - Encourage broncho-pulmonary hygiene including cough, deep breathe, Incentive Spirometry  - Assess the need for suctioning and perform as needed  - Assess and instruct to report SOB or any respiratory difficulty  - Respiratory Therapy support as indicated  - Manage/alleviate anxiety  - Monitor for signs/symptoms of CO2 retention  Outcome: Progressing     Problem: METABOLIC/FLUID AND ELECTROLYTES - ADULT  Goal: Glucose maintained within prescribed range  Description: INTERVENTIONS:  - Monitor Blood Glucose as ordered  - Assess for signs and symptoms of hyperglycemia and hypoglycemia  - Administer ordered medications to maintain glucose within target range  - Assess barriers to adequate nutritional intake and initiate nutrition consult as needed  - Instruct patient on self management of diabetes  Outcome: Progressing  Goal: Electrolytes maintained within normal limits  Description: INTERVENTIONS:  - Monitor labs and rhythm and assess patient for signs and symptoms of electrolyte imbalances  - Administer electrolyte replacement as ordered  - Monitor response to electrolyte replacements, including rhythm and repeat lab results as appropriate  - Fluid restriction as ordered  - Instruct patient on fluid and nutrition restrictions as appropriate  Outcome: Progressing  Goal: Hemodynamic stability and optimal renal function maintained  Description: INTERVENTIONS:  - Monitor labs and assess for signs and symptoms of volume excess or deficit  - Monitor intake, output and patient weight  - Monitor urine specific gravity, serum osmolarity and serum sodium as indicated or ordered  - Monitor response to interventions for patient's volume status, including labs, urine output, blood pressure (other measures as available)  - Encourage oral intake as appropriate  - Instruct patient on fluid and nutrition restrictions as appropriate  Outcome: Progressing     Problem: SKIN/TISSUE INTEGRITY - ADULT  Goal: Skin integrity remains intact  Description: INTERVENTIONS  - Assess and document risk factors for pressure ulcer development  - Assess and document skin integrity  - Monitor for areas of redness and/or skin breakdown  - Initiate interventions, skin care algorithm/standards of care as needed  Outcome: Progressing     Problem: MUSCULOSKELETAL - ADULT  Goal: Return mobility to safest level of function  Description: INTERVENTIONS:  - Assess patient stability and activity tolerance for standing, transferring and ambulating w/ or w/o assistive devices  - Assist with transfers and ambulation using safe patient handling equipment as needed  - Ensure adequate protection for wounds/incisions during mobilization  - Obtain PT/OT consults as needed  - Advance activity as appropriate  - Communicate ordered activity level and limitations with patient/family  Outcome: Progressing  Goal: Maintain proper alignment of affected body part  Description: INTERVENTIONS:  - Support and protect limb and body alignment per provider's orders  - Instruct and reinforce with patient and family use of appropriate assistive device and precautions (e.g. spinal or hip dislocation precautions)  Outcome: Progressing     Problem: PAIN - ADULT  Goal: Verbalizes/displays adequate comfort level or patient's stated pain goal  Description: INTERVENTIONS:  - Encourage pt to monitor pain and request assistance  - Assess pain using appropriate pain scale  - Administer analgesics based on type and severity of pain and evaluate response  - Implement non-pharmacological measures as appropriate and evaluate response  - Consider cultural and social influences on pain and pain management  - Manage/alleviate anxiety  - Utilize distraction and/or relaxation techniques  - Monitor for opioid side effects  - Notify MD/LIP if interventions unsuccessful or patient reports new pain  - Anticipate increased pain with activity and pre-medicate as appropriate  Outcome: Progressing     Problem: SAFETY ADULT - FALL  Goal: Free from fall injury  Description: INTERVENTIONS:  - Assess pt frequently for physical needs  - Identify cognitive and physical deficits and behaviors that affect risk of falls.   - [place] fall precautions as indicated by assessment.  - Educate pt/family on patient safety including physical limitations  - Instruct pt to call for assistance with activity based on assessment  - Modify environment to reduce risk of injury  - Provide assistive devices as appropriate  - Consider OT/PT consult to assist with strengthening/mobility  - Encourage toileting schedule  Outcome: Progressing     Problem: DISCHARGE PLANNING  Goal: Discharge to home or other facility with appropriate resources  Description: INTERVENTIONS:  - Identify barriers to discharge w/pt and caregiver  - Include patient/family/discharge partner in discharge planning  - Arrange for needed discharge resources and transportation as appropriate  - Identify discharge learning needs (meds, wound care, etc)  - Arrange for interpreters to assist at discharge as needed  - Consider post-discharge preferences of patient/family/discharge partner  - Complete POLST form as appropriate  - Assess patient's ability to be responsible for managing their own health  - Refer to Case Management Department for coordinating discharge planning if the patient needs post-hospital services based on physician/LIP order or complex needs related to functional status, cognitive ability or social support system  Outcome: Progressing

## 2023-12-08 NOTE — PLAN OF CARE
No acute changes at this time, patient currently resting in bed. No reports of pain or discomfort at this time. Prn medication given for cough. Problem: Patient Centered Care  Goal: Patient preferences are identified and integrated in the patient's plan of care  Description: Interventions:  - What would you like us to know as we care for you? I live at home alone  - Provide timely, complete, and accurate information to patient/family  - Incorporate patient and family knowledge, values, beliefs, and cultural backgrounds into the planning and delivery of care  - Encourage patient/family to participate in care and decision-making at the level they choose  - Honor patient and family perspectives and choices  Outcome: Progressing     Problem: Patient/Family Goals  Goal: Patient/Family Long Term Goal  Description: Patient's Long Term Goal: Resolve cough    Interventions:  - Administer antitussives as needed  - Monitor vital signs  - encourage adequate hydration  - See additional Care Plan goals for specific interventions  Outcome: Progressing  Goal: Patient/Family Short Term Goal  Description: Patient's Short Term Goal: resolve shortness of breath    Interventions:   - Monitor respiratory status  - monitor vital signs  - administer oxygen as needed  - See additional Care Plan goals for specific interventions  Outcome: Progressing     Problem: CARDIOVASCULAR - ADULT  Goal: Maintains optimal cardiac output and hemodynamic stability  Description: INTERVENTIONS:  - Monitor vital signs, rhythm, and trends  - Monitor for bleeding, hypotension and signs of decreased cardiac output  - Evaluate effectiveness of vasoactive medications to optimize hemodynamic stability  - Monitor arterial and/or venous puncture sites for bleeding and/or hematoma  - Assess quality of pulses, skin color and temperature  - Assess for signs of decreased coronary artery perfusion - ex.  Angina  - Evaluate fluid balance, assess for edema, trend weights  Outcome: Progressing  Goal: Absence of cardiac arrhythmias or at baseline  Description: INTERVENTIONS:  - Continuous cardiac monitoring, monitor vital signs, obtain 12 lead EKG if indicated  - Evaluate effectiveness of antiarrhythmic and heart rate control medications as ordered  - Initiate emergency measures for life threatening arrhythmias  - Monitor electrolytes and administer replacement therapy as ordered  Outcome: Progressing     Problem: RESPIRATORY - ADULT  Goal: Achieves optimal ventilation and oxygenation  Description: INTERVENTIONS:  - Assess for changes in respiratory status  - Assess for changes in mentation and behavior  - Position to facilitate oxygenation and minimize respiratory effort  - Oxygen supplementation based on oxygen saturation or ABGs  - Provide Smoking Cessation handout, if applicable  - Encourage broncho-pulmonary hygiene including cough, deep breathe, Incentive Spirometry  - Assess the need for suctioning and perform as needed  - Assess and instruct to report SOB or any respiratory difficulty  - Respiratory Therapy support as indicated  - Manage/alleviate anxiety  - Monitor for signs/symptoms of CO2 retention  Outcome: Progressing     Problem: METABOLIC/FLUID AND ELECTROLYTES - ADULT  Goal: Glucose maintained within prescribed range  Description: INTERVENTIONS:  - Monitor Blood Glucose as ordered  - Assess for signs and symptoms of hyperglycemia and hypoglycemia  - Administer ordered medications to maintain glucose within target range  - Assess barriers to adequate nutritional intake and initiate nutrition consult as needed  - Instruct patient on self management of diabetes  Outcome: Progressing  Goal: Electrolytes maintained within normal limits  Description: INTERVENTIONS:  - Monitor labs and rhythm and assess patient for signs and symptoms of electrolyte imbalances  - Administer electrolyte replacement as ordered  - Monitor response to electrolyte replacements, including rhythm and repeat lab results as appropriate  - Fluid restriction as ordered  - Instruct patient on fluid and nutrition restrictions as appropriate  Outcome: Progressing  Goal: Hemodynamic stability and optimal renal function maintained  Description: INTERVENTIONS:  - Monitor labs and assess for signs and symptoms of volume excess or deficit  - Monitor intake, output and patient weight  - Monitor urine specific gravity, serum osmolarity and serum sodium as indicated or ordered  - Monitor response to interventions for patient's volume status, including labs, urine output, blood pressure (other measures as available)  - Encourage oral intake as appropriate  - Instruct patient on fluid and nutrition restrictions as appropriate  Outcome: Progressing     Problem: SKIN/TISSUE INTEGRITY - ADULT  Goal: Skin integrity remains intact  Description: INTERVENTIONS  - Assess and document risk factors for pressure ulcer development  - Assess and document skin integrity  - Monitor for areas of redness and/or skin breakdown  - Initiate interventions, skin care algorithm/standards of care as needed  Outcome: Progressing     Problem: MUSCULOSKELETAL - ADULT  Goal: Return mobility to safest level of function  Description: INTERVENTIONS:  - Assess patient stability and activity tolerance for standing, transferring and ambulating w/ or w/o assistive devices  - Assist with transfers and ambulation using safe patient handling equipment as needed  - Ensure adequate protection for wounds/incisions during mobilization  - Obtain PT/OT consults as needed  - Advance activity as appropriate  - Communicate ordered activity level and limitations with patient/family  Outcome: Progressing  Goal: Maintain proper alignment of affected body part  Description: INTERVENTIONS:  - Support and protect limb and body alignment per provider's orders  - Instruct and reinforce with patient and family use of appropriate assistive device and precautions (e.g. spinal or hip dislocation precautions)  Outcome: Progressing     Problem: PAIN - ADULT  Goal: Verbalizes/displays adequate comfort level or patient's stated pain goal  Description: INTERVENTIONS:  - Encourage pt to monitor pain and request assistance  - Assess pain using appropriate pain scale  - Administer analgesics based on type and severity of pain and evaluate response  - Implement non-pharmacological measures as appropriate and evaluate response  - Consider cultural and social influences on pain and pain management  - Manage/alleviate anxiety  - Utilize distraction and/or relaxation techniques  - Monitor for opioid side effects  - Notify MD/LIP if interventions unsuccessful or patient reports new pain  - Anticipate increased pain with activity and pre-medicate as appropriate  Outcome: Progressing     Problem: SAFETY ADULT - FALL  Goal: Free from fall injury  Description: INTERVENTIONS:  - Assess pt frequently for physical needs  - Identify cognitive and physical deficits and behaviors that affect risk of falls.   - [place] fall precautions as indicated by assessment.  - Educate pt/family on patient safety including physical limitations  - Instruct pt to call for assistance with activity based on assessment  - Modify environment to reduce risk of injury  - Provide assistive devices as appropriate  - Consider OT/PT consult to assist with strengthening/mobility  - Encourage toileting schedule  Outcome: Progressing     Problem: DISCHARGE PLANNING  Goal: Discharge to home or other facility with appropriate resources  Description: INTERVENTIONS:  - Identify barriers to discharge w/pt and caregiver  - Include patient/family/discharge partner in discharge planning  - Arrange for needed discharge resources and transportation as appropriate  - Identify discharge learning needs (meds, wound care, etc)  - Arrange for interpreters to assist at discharge as needed  - Consider post-discharge preferences of patient/family/discharge partner  - Complete POLST form as appropriate  - Assess patient's ability to be responsible for managing their own health  - Refer to Case Management Department for coordinating discharge planning if the patient needs post-hospital services based on physician/LIP order or complex needs related to functional status, cognitive ability or social support system  Outcome: Progressing

## 2023-12-08 NOTE — PHYSICAL THERAPY NOTE
PHYSICAL THERAPY TREATMENT NOTE - INPATIENT     Room Number: 684/180-T       Presenting Problem: persistent coughing, SOB/LEIF, found to be RSV+ 11/30/23 and worsening symptoms    Problem List  Principal Problem:    Acute bronchiolitis due to respiratory syncytial virus (RSV)      PHYSICAL THERAPY ASSESSMENT   Chart reviewed. RN Saniya Ha approved participation in physical therapy. PPE worn by therapist: mask, gloves, and goggles. Patient was not wearing a mask during session. Patient presented in bed with 0/10 pain. Patient with good  progress towards goals during this session. Education provided on Physical therapy plan of care and physiological benefits of out of bed mobility. Patient with good carryover. Pt is received in the bed and was cleared for therapy session. Pt is IND with all bed mobilities and to transfer in and out of the bed. Pt denied any dizziness and light headedness. Pt per notes has been up ad clifford in her room. Pt is SBA with sit<>stand transfers with the RW. Pt was able to AMB about 200' with the RW SBA. Pt with narrow FRAN but with very good balance and safety awareness. Pt also has been AMB in the hallways throughout the day with nursing staff. Returned pt back to the room and back to the bed. Pt is left in the bed with all needs within reach. Pt is on track to dc to home once medically cleared. Reported to the RN on the status of the pt. Bed mobility: Independent  Transfers: Supervision  Gait Assistance: Supervision  Distance (ft): 200'  Assistive Device: Rolling walker  Pattern: Within Functional Limits          . Patient was left in bed at end of session with all needs in reach. The patient's Approx Degree of Impairment: 20.91% has been calculated based on documentation in the [facility] '6 clicks' Inpatient Basic Mobility Short Form. Research supports that patients with this level of impairment may benefit from Home with home health PT. RN aware of patient status post session.     DISCHARGE RECOMMENDATIONS  PT Discharge Recommendations: Home with home health PT; Intermittent Supervision     PLAN  PT Treatment Plan: Bed mobility; Body mechanics; Coordination; Endurance; Patient education;Gait training;Strengthening;Stoop training;Transfer training;Balance training    SUBJECTIVE  Pt was agreeable to therapy session. OBJECTIVE  Precautions:  (droplet)    WEIGHT BEARING RESTRICTION  Weight Bearing Restriction: None                PAIN ASSESSMENT   Ratin  Location: pt reports abdominal muscles are sore from coughing  Management Techniques: Activity promotion; Body mechanics; Relaxation;Repositioning    BALANCE                                                                                                                       Static Sitting: Good  Dynamic Sitting: Good           Static Standing: Fair +  Dynamic Standing: Fair    ACTIVITY TOLERANCE                         O2 WALK       AM-PAC '6-Clicks' INPATIENT SHORT FORM - BASIC MOBILITY  How much difficulty does the patient currently have. .. Patient Difficulty: Turning over in bed (including adjusting bedclothes, sheets and blankets)?: None   Patient Difficulty: Sitting down on and standing up from a chair with arms (e.g., wheelchair, bedside commode, etc.): None   Patient Difficulty: Moving from lying on back to sitting on the side of the bed?: None   How much help from another person does the patient currently need. .. Help from Another: Moving to and from a bed to a chair (including a wheelchair)?: None   Help from Another: Need to walk in hospital room?: A Little   Help from Another: Climbing 3-5 steps with a railing?: A Little     AM-PAC Score:  Raw Score: 22   Approx Degree of Impairment: 20.91%   Standardized Score (AM-PAC Scale): 53.28   CMS Modifier (G-Code): CJ          Patient End of Session: In bed;Needs met;Call light within reach;RN aware of session/findings; All patient questions and concerns addressed    CURRENT GOALS   Goals to be met by: 12/15/23  Patient Goal Patient's self-stated goal is: to stop coughing and be able to go home   Goal #1 Patient is able to demonstrate supine - sit EOB @ level: independent     Goal #1   Current Status IND   Goal #2      Goal #2  Current Status    Goal #3 Patient is able to ambulate 200 feet with assist device: walker - rolling at assistance level: modified independent and SpO2 of 94% or better   Goal #3   Current Status 200' with the RW SBA   Goal #4 Patient will negotiate one small curb w/ rolling walker and supervision   Goal #4   Current Status NT   Goal #5 Patient to demonstrate independence with home activity/exercise instructions provided to patient in preparation for discharge.    Goal #5   Current Status IN PROGRESS   Goal #6    Goal #6  Current Status            Gait Training: 10 minutes  Therapeutic Activity: 15 minutes

## 2023-12-08 NOTE — PLAN OF CARE
Problem: Patient Centered Care  Goal: Patient preferences are identified and integrated in the patient's plan of care  Description: Interventions:  - What would you like us to know as we care for you? I live at home alone  - Provide timely, complete, and accurate information to patient/family  - Incorporate patient and family knowledge, values, beliefs, and cultural backgrounds into the planning and delivery of care  - Encourage patient/family to participate in care and decision-making at the level they choose  - Honor patient and family perspectives and choices  Outcome: Progressing     Problem: Patient/Family Goals  Goal: Patient/Family Long Term Goal  Description: Patient's Long Term Goal: Resolve cough    Interventions:  - Administer antitussives as needed  - Monitor vital signs  - encourage adequate hydration  - See additional Care Plan goals for specific interventions  Outcome: Progressing  Goal: Patient/Family Short Term Goal  Description: Patient's Short Term Goal: resolve shortness of breath    Interventions:   - Monitor respiratory status  - monitor vital signs  - administer oxygen as needed  - See additional Care Plan goals for specific interventions  Outcome: Progressing     Problem: CARDIOVASCULAR - ADULT  Goal: Maintains optimal cardiac output and hemodynamic stability  Description: INTERVENTIONS:  - Monitor vital signs, rhythm, and trends  - Monitor for bleeding, hypotension and signs of decreased cardiac output  - Evaluate effectiveness of vasoactive medications to optimize hemodynamic stability  - Monitor arterial and/or venous puncture sites for bleeding and/or hematoma  - Assess quality of pulses, skin color and temperature  - Assess for signs of decreased coronary artery perfusion - ex.  Angina  - Evaluate fluid balance, assess for edema, trend weights  Outcome: Progressing  Goal: Absence of cardiac arrhythmias or at baseline  Description: INTERVENTIONS:  - Continuous cardiac monitoring, monitor vital signs, obtain 12 lead EKG if indicated  - Evaluate effectiveness of antiarrhythmic and heart rate control medications as ordered  - Initiate emergency measures for life threatening arrhythmias  - Monitor electrolytes and administer replacement therapy as ordered  Outcome: Progressing     Problem: RESPIRATORY - ADULT  Goal: Achieves optimal ventilation and oxygenation  Description: INTERVENTIONS:  - Assess for changes in respiratory status  - Assess for changes in mentation and behavior  - Position to facilitate oxygenation and minimize respiratory effort  - Oxygen supplementation based on oxygen saturation or ABGs  - Provide Smoking Cessation handout, if applicable  - Encourage broncho-pulmonary hygiene including cough, deep breathe, Incentive Spirometry  - Assess the need for suctioning and perform as needed  - Assess and instruct to report SOB or any respiratory difficulty  - Respiratory Therapy support as indicated  - Manage/alleviate anxiety  - Monitor for signs/symptoms of CO2 retention  Outcome: Progressing     Problem: METABOLIC/FLUID AND ELECTROLYTES - ADULT  Goal: Glucose maintained within prescribed range  Description: INTERVENTIONS:  - Monitor Blood Glucose as ordered  - Assess for signs and symptoms of hyperglycemia and hypoglycemia  - Administer ordered medications to maintain glucose within target range  - Assess barriers to adequate nutritional intake and initiate nutrition consult as needed  - Instruct patient on self management of diabetes  Outcome: Progressing  Goal: Electrolytes maintained within normal limits  Description: INTERVENTIONS:  - Monitor labs and rhythm and assess patient for signs and symptoms of electrolyte imbalances  - Administer electrolyte replacement as ordered  - Monitor response to electrolyte replacements, including rhythm and repeat lab results as appropriate  - Fluid restriction as ordered  - Instruct patient on fluid and nutrition restrictions as appropriate  Outcome: Progressing  Goal: Hemodynamic stability and optimal renal function maintained  Description: INTERVENTIONS:  - Monitor labs and assess for signs and symptoms of volume excess or deficit  - Monitor intake, output and patient weight  - Monitor urine specific gravity, serum osmolarity and serum sodium as indicated or ordered  - Monitor response to interventions for patient's volume status, including labs, urine output, blood pressure (other measures as available)  - Encourage oral intake as appropriate  - Instruct patient on fluid and nutrition restrictions as appropriate  Outcome: Progressing     Problem: SKIN/TISSUE INTEGRITY - ADULT  Goal: Skin integrity remains intact  Description: INTERVENTIONS  - Assess and document risk factors for pressure ulcer development  - Assess and document skin integrity  - Monitor for areas of redness and/or skin breakdown  - Initiate interventions, skin care algorithm/standards of care as needed  Outcome: Progressing     Problem: MUSCULOSKELETAL - ADULT  Goal: Return mobility to safest level of function  Description: INTERVENTIONS:  - Assess patient stability and activity tolerance for standing, transferring and ambulating w/ or w/o assistive devices  - Assist with transfers and ambulation using safe patient handling equipment as needed  - Ensure adequate protection for wounds/incisions during mobilization  - Obtain PT/OT consults as needed  - Advance activity as appropriate  - Communicate ordered activity level and limitations with patient/family  Outcome: Progressing  Goal: Maintain proper alignment of affected body part  Description: INTERVENTIONS:  - Support and protect limb and body alignment per provider's orders  - Instruct and reinforce with patient and family use of appropriate assistive device and precautions (e.g. spinal or hip dislocation precautions)  Outcome: Progressing     Problem: PAIN - ADULT  Goal: Verbalizes/displays adequate comfort level or patient's stated pain goal  Description: INTERVENTIONS:  - Encourage pt to monitor pain and request assistance  - Assess pain using appropriate pain scale  - Administer analgesics based on type and severity of pain and evaluate response  - Implement non-pharmacological measures as appropriate and evaluate response  - Consider cultural and social influences on pain and pain management  - Manage/alleviate anxiety  - Utilize distraction and/or relaxation techniques  - Monitor for opioid side effects  - Notify MD/LIP if interventions unsuccessful or patient reports new pain  - Anticipate increased pain with activity and pre-medicate as appropriate  Outcome: Progressing     Problem: SAFETY ADULT - FALL  Goal: Free from fall injury  Description: INTERVENTIONS:  - Assess pt frequently for physical needs  - Identify cognitive and physical deficits and behaviors that affect risk of falls.   - [place] fall precautions as indicated by assessment.  - Educate pt/family on patient safety including physical limitations  - Instruct pt to call for assistance with activity based on assessment  - Modify environment to reduce risk of injury  - Provide assistive devices as appropriate  - Consider OT/PT consult to assist with strengthening/mobility  - Encourage toileting schedule  Outcome: Progressing     Problem: DISCHARGE PLANNING  Goal: Discharge to home or other facility with appropriate resources  Description: INTERVENTIONS:  - Identify barriers to discharge w/pt and caregiver  - Include patient/family/discharge partner in discharge planning  - Arrange for needed discharge resources and transportation as appropriate  - Identify discharge learning needs (meds, wound care, etc)  - Arrange for interpreters to assist at discharge as needed  - Consider post-discharge preferences of patient/family/discharge partner  - Complete POLST form as appropriate  - Assess patient's ability to be responsible for managing their own health  - Refer to Case Management Department for coordinating discharge planning if the patient needs post-hospital services based on physician/LIP order or complex needs related to functional status, cognitive ability or social support system  Outcome: Progressing     Patient is presently resting in the bed. Alert x 4. Vital signs taken and monitored. Currently on room air. No complaints of shortness of breath or difficulty breathing. Vital signs taken and stable. Patient will be discharge home today with family. Self care and independent. Patient will be discharge home today with family. Patient on droplet precautions due to positive RSV. Call light within reach at all times.

## 2023-12-08 NOTE — DISCHARGE PLANNING
Discharge instructions given to patient. Patient was instructed to follow up with doctor for appointment. Patient verbalized understanding of discharge instructions. Saline lock removed. Patient transported by wheelchair and discharge home with daughter.

## 2023-12-08 NOTE — DISCHARGE INSTRUCTIONS
(1) Follow up with primary care doctor for appointment in 1 week. (2) Follow up with Dr. Mildred Hudson in 2 weeks for appointment. (3) Please take all medications as directed by the doctor. (4) Notify doctor immediately for fever, chills, vomiting, or shortness of breath or difficulty breathing. Please follow up with your PCP in 1 week of discharge. Please follow up with Dr Jennifer Waldron (pulmonologist) within two weeks of discharge if your cough does not improve.

## 2023-12-11 ENCOUNTER — TELEPHONE (OUTPATIENT)
Dept: INTERNAL MEDICINE CLINIC | Facility: CLINIC | Age: 88
End: 2023-12-11

## 2023-12-11 ENCOUNTER — PATIENT OUTREACH (OUTPATIENT)
Dept: CASE MANAGEMENT | Age: 88
End: 2023-12-11

## 2023-12-11 DIAGNOSIS — J21.0 ACUTE BRONCHIOLITIS DUE TO RESPIRATORY SYNCYTIAL VIRUS: ICD-10-CM

## 2023-12-11 DIAGNOSIS — Z02.9 ENCOUNTERS FOR UNSPECIFIED ADMINISTRATIVE PURPOSE: Primary | ICD-10-CM

## 2023-12-11 PROCEDURE — 1111F DSCHRG MED/CURRENT MED MERGE: CPT

## 2023-12-11 NOTE — TELEPHONE ENCOUNTER
Jason from [place] [facility] [address] left a voicemail message. He would like this patient to continue home health services, since she just got home from the hospital.    Phone # [phone].

## 2023-12-11 NOTE — TELEPHONE ENCOUNTER
Spoke to patient for TCM today. Patient does not have an appointment scheduled at this time. TCM appointment recommended by 12/15/2023 as patient is a High risk for readmission. Please advise. JORDAN Attempted to schedule PCP office TCM appointment with patient, she states that her daughter No Rodriguez will be calling to schedule her follow up appointments as she has to schedule around her work schedule. Message sent to MD's office. BOOK BY DATE (last date for TCM): 12/22/2023    Clinical staff:  Please follow-up with patient's daughter Adilene Orozco [phone] to schedule a TCM appointment within the TCM timeframe. Thank you!

## 2023-12-14 ENCOUNTER — TELEPHONE (OUTPATIENT)
Dept: INTERNAL MEDICINE CLINIC | Facility: CLINIC | Age: 88
End: 2023-12-14

## 2023-12-14 NOTE — TELEPHONE ENCOUNTER
Nurse Guillen called on behalf of patient requesting re certification as soon as this week or next week. The patient was discharged from [address] [place] [address] on 12/04 for RSV. Nurse Guillen says the patient still has a cough and needs respiratory assessments and med management.

## 2023-12-19 ENCOUNTER — OFFICE VISIT (OUTPATIENT)
Dept: INTERNAL MEDICINE CLINIC | Facility: CLINIC | Age: 88
End: 2023-12-19
Payer: MEDICARE

## 2023-12-19 VITALS
BODY MASS INDEX: 35.51 KG/M2 | HEIGHT: 64 IN | SYSTOLIC BLOOD PRESSURE: 150 MMHG | WEIGHT: 208 LBS | DIASTOLIC BLOOD PRESSURE: 70 MMHG | HEART RATE: 69 BPM | OXYGEN SATURATION: 97 %

## 2023-12-19 DIAGNOSIS — I27.82 CHRONIC PULMONARY EMBOLISM WITH ACUTE COR PULMONALE, UNSPECIFIED PULMONARY EMBOLISM TYPE (HCC): ICD-10-CM

## 2023-12-19 DIAGNOSIS — J21.0 RSV BRONCHIOLITIS: Primary | ICD-10-CM

## 2023-12-19 DIAGNOSIS — I26.09 CHRONIC PULMONARY EMBOLISM WITH ACUTE COR PULMONALE, UNSPECIFIED PULMONARY EMBOLISM TYPE (HCC): ICD-10-CM

## 2023-12-19 PROCEDURE — 1111F DSCHRG MED/CURRENT MED MERGE: CPT | Performed by: INTERNAL MEDICINE

## 2023-12-19 PROCEDURE — 99214 OFFICE O/P EST MOD 30 MIN: CPT | Performed by: INTERNAL MEDICINE

## 2023-12-19 RX ORDER — PREDNISONE 10 MG/1
10 TABLET ORAL 2 TIMES DAILY
Qty: 10 TABLET | Refills: 1 | Status: SHIPPED | OUTPATIENT
Start: 2023-12-19

## 2023-12-19 RX ORDER — BENZONATATE 200 MG/1
200 CAPSULE ORAL 3 TIMES DAILY PRN
Qty: 20 CAPSULE | Refills: 0 | Status: SHIPPED | OUTPATIENT
Start: 2023-12-19

## 2024-01-08 ENCOUNTER — NURSE TRIAGE (OUTPATIENT)
Dept: INTERNAL MEDICINE CLINIC | Facility: CLINIC | Age: 89
End: 2024-01-08

## 2024-01-08 ENCOUNTER — OFFICE VISIT (OUTPATIENT)
Dept: INTERNAL MEDICINE CLINIC | Facility: CLINIC | Age: 89
End: 2024-01-08
Payer: MEDICARE

## 2024-01-08 VITALS
WEIGHT: 215.38 LBS | TEMPERATURE: 97 F | OXYGEN SATURATION: 97 % | DIASTOLIC BLOOD PRESSURE: 64 MMHG | SYSTOLIC BLOOD PRESSURE: 140 MMHG | HEART RATE: 73 BPM | BODY MASS INDEX: 36.77 KG/M2 | HEIGHT: 64 IN

## 2024-01-08 DIAGNOSIS — R30.0 DYSURIA: Primary | ICD-10-CM

## 2024-01-08 LAB
BILIRUBIN: NEGATIVE
GLUCOSE (URINE DIPSTICK): NEGATIVE MG/DL
KETONES (URINE DIPSTICK): NEGATIVE MG/DL
MULTISTIX LOT#: ABNORMAL NUMERIC
NITRITE, URINE: NEGATIVE
PH, URINE: 7 (ref 4.5–8)
PROTEIN (URINE DIPSTICK): NEGATIVE MG/DL
SPECIFIC GRAVITY: 1.02 (ref 1–1.03)
UROBILINOGEN,SEMI-QN: 0.2 MG/DL (ref 0–1.9)

## 2024-01-08 PROCEDURE — 87086 URINE CULTURE/COLONY COUNT: CPT

## 2024-01-08 RX ORDER — CEPHALEXIN 500 MG/1
500 CAPSULE ORAL 2 TIMES DAILY
Qty: 20 CAPSULE | Refills: 0 | Status: SHIPPED | OUTPATIENT
Start: 2024-01-08

## 2024-01-08 NOTE — PROGRESS NOTES
Rosaura Hermosillo is a 89 year old female.  CHIEF COMPLAINT:     Chief Complaint   Patient presents with    UTI     Possible UTI, urine frequency- for a week.      Here at visit with daughter  HPI:   Patient presents with symptoms of UTI. Reports 7 day history of urinary frequency and dysuria, with perineal pressure.  Denies flank pain, hematuria, nausea, vomiting, fever or malaise.  Denies vaginal itching or discharge. Was hospitalized in December for RSV- URI (12/4/23- 12/11/23). Reports that cough is a lot better.  Has been using an external female cather sytem- Purewick, since hospitalization, which has helped a lot for use when sleeping  Using Lasix 40mg once a day, instead twice a day since she has urinary frequency  Hx of C. Diff a few years ago. On Pantoprazole  Current Outpatient Medications   Medication Sig Dispense Refill    benzonatate 200 MG Oral Cap Take 1 capsule (200 mg total) by mouth 3 (three) times daily as needed for cough. 20 capsule 0    albuterol 108 (90 Base) MCG/ACT Inhalation Aero Soln Inhale 2 puffs into the lungs every 4 (four) hours as needed for Wheezing or Shortness of Breath. 1 each 0    apixaban 5 MG Oral Tab Take 1 tablet (5 mg total) by mouth 2 (two) times daily. 60 tablet 2    pantoprazole 40 MG Oral Tab EC Take 1 tablet (40 mg total) by mouth before breakfast. 90 tablet 0    furosemide 40 MG Oral Tab Take 1 tablet (40 mg total) by mouth 2 (two) times daily. 30 tablet 5    alendronate 70 MG Oral Tab Take 1 tablet (70 mg total) by mouth every 7 days. 4 tablet 11    DILTIAZEM  MG Oral Capsule SR 24 Hr TAKE 1 CAPSULE(240 MG) BY MOUTH DAILY 90 capsule 1    METOPROLOL SUCCINATE ER 50 MG Oral Tablet 24 Hr TAKE 1 TABLET(50 MG) BY MOUTH DAILY 90 tablet 1    Cranberry 500 MG Oral Cap Take by mouth.        Past Medical History:   Diagnosis Date    Abnormal heart rhythm     Cataract 2010    extraction and lens implantation per NG.    Chronic pulmonary embolism (HCC)     Cystocele 2008     repair management per. [facility]    Essential hypertension     Hemorrhoid     Hernia, hiatal     High cholesterol     Hyperlipidemia     S/P cholecystectomy 1995    lap cholecystectomy per. [facility]      Social History:  Social History     Socioeconomic History    Marital status:    Tobacco Use    Smoking status: Former     Packs/day: .5     Types: Cigarettes    Smokeless tobacco: Never    Tobacco comments:     smoked for 2 months only when she was 16 years old   Vaping Use    Vaping Use: Never used   Substance and Sexual Activity    Alcohol use: Yes     Comment: Rarely wine 1 glass    Drug use: Never   Other Topics Concern    Caffeine Concern No     Social Determinants of Health     Financial Resource Strain: Low Risk  (12/11/2023)    Financial Resource Strain     Difficulty of Paying Living Expenses: Not hard at all     Med Affordability: No   Food Insecurity: No Food Insecurity (12/4/2023)    Food Insecurity     Food Insecurity: Never true   Transportation Needs: No Transportation Needs (12/4/2023)    Transportation Needs     Lack of Transportation: No   Housing Stability: Low Risk  (12/4/2023)    Housing Stability     Housing Instability: No         REVIEW OF SYSTEMS:   GENERAL: Denies night sweats, chills, fever, loss of appetite  SKIN: no rashes, no skin wounds or ulcers.  GI: See HPI. No N/V/C/D.   : See HPI.  NEURO: no headaches.    EXAM:   /64   Pulse 73   Temp 97 °F (36.1 °C)   Ht 5' 4\" (1.626 m)   Wt 215 lb 6.4 oz (97.7 kg)   SpO2 97%   BMI 36.97 kg/m²  7 # weight gain since hospitalization 12/11/23. Feeling much better  GENERAL: well developed, well nourished, in no apparent distress  GI: + BS, exam in seated position  : + suprapubic tenderness, no bladder distention, no CVAT   NEURO/PSYCH : appropriate historian, thought content is normal. Steady gait     LAB: U/A  Component  Ref Range & Units 1/8/24  3:49 PM   Glucose Urine  Negative mg/dL Negative   Bilirubin Urine  Negative Negative    Ketones, UA  Negative - Trace mg/dL Negative   Spec Gravity  1.005 - 1.030 1.020   Blood Urine  Negative Small Abnormal    PH Urine  5.0 - 8.0 7.0   Protein Urine  Negative - Trace mg/dL Negative   Urobilinogen Urine  0.2 - 1.0 mg/dL 0.2   Nitrite Urine  Negative Negative   Leukocyte Esterase Urine  Negative Large Abnormal    APPEARANCE  Clear cloudy   Color Urine  Yellow light yellow   Multistix Lot#  Numeric [id number]   Multistix Expiration Date  Date 10/31/2024       ASSESSMENT AND PLAN:   Rosaura Hermosillo is a 89 year old female who presents with   Chief Complaint   Patient presents with    UTI     Possible UTI, urine frequency- for a week.        ASSESSMENT:  Encounter Diagnosis   Name Primary?    Dysuria Yes       PLAN:  Requested Prescriptions     Signed Prescriptions Disp Refills    cephalexin 500 MG Oral Cap 20 capsule 0     Sig: Take 1 capsule (500 mg total) by mouth 2 (two) times daily.     Patient was advised to follow up if there is no improvement in 2-3 days. Patient was instructed to follow up immediately if fever, flank pain, nausea/vomiting occurs.        Patient Instructions   URINARY TRACT INFECTION AVOIDANCE AND PREVENTION    1)  Avoid a full bladder or overfilled bladder. Be sure to wait an addiitional 10-15 seconds after you think you are done, as you may be able to finish going or go again.  \"Count to 10 and go again\". Do not postpone urinating or rush during urination. You want to be able to completely empty your bladder.  2) Please only use water to clean urinary and vaginal areas. NO SOAP, wipes or scented products  3)  Increase fluid intake at the first sign of infection.  4)  Do not use the same tissue to blow your nose as you do to wipe yourself. Your hands will also be contaminated from this.  5) Avoid your undergarment and pants coming in contact with toilet while seated on toilet.  6) Change undergarment daily or when soiled. Change after sweating, i.e. working out  7) Urinate after  cycling, swimming, hot tub or bath tub use.  8) Avoid tight seamed pants and thong use.  9)  Wipe self from front to back  10) Call your clinic or health care provider if symptoms are not resolved by the end of the antibiotic.        The patient indicates understanding of these issues and agrees to the plan.

## 2024-01-08 NOTE — TELEPHONE ENCOUNTER
Patient called as she believes she has a UTI.  I asked when her symptoms started and she said a couple of days ago.    Suggested she see the NP or go to [facility] and she doesn't want to do that.    Wants medications

## 2024-01-08 NOTE — TELEPHONE ENCOUNTER
Future Appointments   Date Time Provider Department Center   1/8/2024  2:30 PM Bria Davis APRN [facility] [address]   2/1/2024  1:00 PM Cam Munguia MD [facility]   9/7/2024 11:30 AM Enid Segal MD [facility] [address]      Reason for Disposition   Urinating more frequently than usual (i.e., frequency)    Answer Assessment - Initial Assessment Questions  1. SYMPTOM: \"What's the main symptom you're concerned about?\" (e.g., frequency, incontinence)      Frequency/ pressure / vaginal itching /   2. ONSET: \"When did the   start?\"      1/4/23  3. PAIN: \"Is there any pain?\" If Yes, ask: \"How bad is it?\" (Scale: 1-10; mild, moderate, severe)      Mild   4. CAUSE: \"What do you think is causing the symptoms?\"      Bladder infection   5. OTHER SYMPTOMS: \"Do you have any other symptoms?\" (e.g., fever, flank pain, blood in urine, pain with urination)      No   6. PREGNANCY: \"Is there any chance you are pregnant?\" \"When was your last menstrual period?\"      No    Protocols used: Urinary Symptoms-A-[place]

## 2024-01-09 NOTE — PATIENT INSTRUCTIONS
URINARY TRACT INFECTION AVOIDANCE AND PREVENTION    1)  Avoid a full bladder or overfilled bladder. Be sure to wait an addiitional 10-15 seconds after you think you are done, as you may be able to finish going or go again.  \"Count to 10 and go again\". Do not postpone urinating or rush during urination. You want to be able to completely empty your bladder.  2) Please only use water to clean urinary and vaginal areas. NO SOAP, wipes or scented products  3)  Increase fluid intake at the first sign of infection.  4)  Do not use the same tissue to blow your nose as you do to wipe yourself. Your hands will also be contaminated from this.  5) Avoid your undergarment and pants coming in contact with toilet while seated on toilet.  6) Change undergarment daily or when soiled. Change after sweating, i.e. working out  7) Urinate after cycling, swimming, hot tub or bath tub use.  8) Avoid tight seamed pants and thong use.  9)  Wipe self from front to back  10) Call your clinic or health care provider if symptoms are not resolved by the end of the antibiotic.

## 2024-01-26 ENCOUNTER — OFFICE VISIT (OUTPATIENT)
Dept: INTERNAL MEDICINE CLINIC | Facility: CLINIC | Age: 89
End: 2024-01-26
Payer: MEDICARE

## 2024-01-26 VITALS
DIASTOLIC BLOOD PRESSURE: 66 MMHG | HEIGHT: 64 IN | HEART RATE: 66 BPM | OXYGEN SATURATION: 99 % | SYSTOLIC BLOOD PRESSURE: 160 MMHG | BODY MASS INDEX: 37.25 KG/M2 | WEIGHT: 218.19 LBS | TEMPERATURE: 97 F

## 2024-01-26 DIAGNOSIS — R31.9 URINARY TRACT INFECTION WITH HEMATURIA, SITE UNSPECIFIED: Primary | ICD-10-CM

## 2024-01-26 DIAGNOSIS — I87.2 VENOUS INSUFFICIENCY (CHRONIC) (PERIPHERAL): ICD-10-CM

## 2024-01-26 DIAGNOSIS — I50.32 CHRONIC DIASTOLIC CONGESTIVE HEART FAILURE (HCC): ICD-10-CM

## 2024-01-26 DIAGNOSIS — R60.0 EDEMA, PERIPHERAL: ICD-10-CM

## 2024-01-26 DIAGNOSIS — N39.0 URINARY TRACT INFECTION WITH HEMATURIA, SITE UNSPECIFIED: Primary | ICD-10-CM

## 2024-01-26 DIAGNOSIS — N18.4 CHRONIC RENAL DISEASE, STAGE IV (HCC): ICD-10-CM

## 2024-01-26 PROBLEM — I50.30 UNSPECIFIED DIASTOLIC (CONGESTIVE) HEART FAILURE (HCC): Status: ACTIVE | Noted: 2023-01-01

## 2024-01-26 PROBLEM — I50.33 ACUTE ON CHRONIC DIASTOLIC CHF (CONGESTIVE HEART FAILURE) (HCC): Status: ACTIVE | Noted: 2023-04-28

## 2024-01-26 LAB
BILIRUBIN: NEGATIVE
GLUCOSE (URINE DIPSTICK): NEGATIVE MG/DL
KETONES (URINE DIPSTICK): NEGATIVE MG/DL
MULTISTIX LOT#: ABNORMAL NUMERIC
NITRITE, URINE: POSITIVE
PH, URINE: 6 (ref 4.5–8)
PROTEIN (URINE DIPSTICK): >=300 MG/DL
SPECIFIC GRAVITY: 1.03 (ref 1–1.03)
URINE-COLOR: YELLOW
UROBILINOGEN,SEMI-QN: 0.2 MG/DL (ref 0–1.9)

## 2024-01-26 PROCEDURE — 87077 CULTURE AEROBIC IDENTIFY: CPT

## 2024-01-26 PROCEDURE — 87086 URINE CULTURE/COLONY COUNT: CPT

## 2024-01-26 PROCEDURE — 99213 OFFICE O/P EST LOW 20 MIN: CPT

## 2024-01-26 PROCEDURE — 81003 URINALYSIS AUTO W/O SCOPE: CPT

## 2024-01-26 PROCEDURE — 87186 SC STD MICRODIL/AGAR DIL: CPT

## 2024-01-26 RX ORDER — CEPHALEXIN 500 MG/1
500 TABLET ORAL 3 TIMES DAILY
Qty: 21 TABLET | Refills: 0 | Status: SHIPPED | OUTPATIENT
Start: 2024-01-26

## 2024-01-26 NOTE — PROGRESS NOTES
Rosaura Hermosillo is a 89 year old female.  CHIEF COMPLAINT:     Chief Complaint   Patient presents with    Bladder Problem     Bladder infection, painful when starting to urinate, burning sensation,  feeling weak- for a couple of days.     Has only been using Furosemide once a day, when ordered as twice a day    HPI:   Patient presents with symptoms of UTI. Reports 6-7 day history of urinary frequency and dysuria.  Denies flank pain, hematuria, nausea, vomiting, fever or malaise.  Denies vaginal discharge.  Had cystitis 1/8/24 but culture did not grow out any concerning organism. Had been on Keflex 500mg BID but was to stop antibiotic because of Negative culture ( See Lab note). Pt took antibiotic for 10 days though. Uses an external catheter at bedtime - Tiny   Has only been using Furosemide once a day, when ordered as twice a day. Does not take when she has social events. Has severe peripheral edema in lower legs. 3 # weight gain since 1/8/24  Sees Dr. Hernandez, Cardiology, but not have a future follow up appt.  Current Outpatient Medications   Medication Sig Dispense Refill    cephalexin 500 MG Oral Cap Take 1 capsule (500 mg total) by mouth 2 (two) times daily. 20 capsule 0    benzonatate 200 MG Oral Cap Take 1 capsule (200 mg total) by mouth 3 (three) times daily as needed for cough. 20 capsule 0    albuterol 108 (90 Base) MCG/ACT Inhalation Aero Soln Inhale 2 puffs into the lungs every 4 (four) hours as needed for Wheezing or Shortness of Breath. 1 each 0    apixaban 5 MG Oral Tab Take 1 tablet (5 mg total) by mouth 2 (two) times daily. 60 tablet 2    pantoprazole 40 MG Oral Tab EC Take 1 tablet (40 mg total) by mouth before breakfast. 90 tablet 0    furosemide 40 MG Oral Tab Take 1 tablet (40 mg total) by mouth 2 (two) times daily. 30 tablet 5    alendronate 70 MG Oral Tab Take 1 tablet (70 mg total) by mouth every 7 days. 4 tablet 11    DILTIAZEM  MG Oral Capsule SR 24 Hr TAKE 1 CAPSULE(240 MG)  BY MOUTH DAILY 90 capsule 1    METOPROLOL SUCCINATE ER 50 MG Oral Tablet 24 Hr TAKE 1 TABLET(50 MG) BY MOUTH DAILY 90 tablet 1    Cranberry 500 MG Oral Cap Take by mouth.        Past Medical History:   Diagnosis Date    Abnormal heart rhythm     Cataract 2010    extraction and lens implantation per NG.    Chronic pulmonary embolism (HCC)     Cystocele 2008    repair management per. [facility]    Essential hypertension     Hemorrhoid     Hernia, hiatal     High cholesterol     Hyperlipidemia     S/P cholecystectomy 1995    lap cholecystectomy per. [facility]      Social History:  Social History     Socioeconomic History    Marital status:    Tobacco Use    Smoking status: Former     Packs/day: .5     Types: Cigarettes    Smokeless tobacco: Never    Tobacco comments:     smoked for 2 months only when she was 16 years old   Vaping Use    Vaping Use: Never used   Substance and Sexual Activity    Alcohol use: Yes     Comment: Rarely wine 1 glass    Drug use: Never   Other Topics Concern    Caffeine Concern No     Social Determinants of Health     Financial Resource Strain: Low Risk  (12/11/2023)    Financial Resource Strain     Difficulty of Paying Living Expenses: Not hard at all     Med Affordability: No   Food Insecurity: No Food Insecurity (12/4/2023)    Food Insecurity     Food Insecurity: Never true   Transportation Needs: No Transportation Needs (12/4/2023)    Transportation Needs     Lack of Transportation: No   Housing Stability: Low Risk  (12/4/2023)    Housing Stability     Housing Instability: No         REVIEW OF SYSTEMS:   GENERAL: Denies night sweats, chills, fever, weight loss, loss of appetite  SKIN: no rashes, no skin wounds or ulcers.  GI: See HPI. No N/V States she has a BM every time she uses bathroom to urinate.    : See HPI.  NEURO: no headaches.    EXAM:   /66   Pulse 66   Temp 97 °F (36.1 °C)   Ht 5' 4\" (1.626 m)   Wt 218 lb 3.2 oz (99 kg)   SpO2 99%   BMI 37.45 kg/m²   GENERAL: well  developed, well nourished, in no apparent distress  CARDIO: RRR, no murmurs  LUNGS: clear to ausculation bilaterally, but a little diminished on left  : + suprapubic  and left groin tenderness, not able to assess for bladder distention, no CVAT     ontains abnormal data URINALYSIS, AUTO, W/O SCOPE  Order: [id number]  Collected 1/26/2024  2:45 PM       Status: Final result       Dx: Urinary tract infection with hematuri...    0 Result Notes      Component  Ref Range & Units 1/26/24  2:45 PM   Glucose Urine  Negative mg/dL Negative   Bilirubin Urine  Negative Negative   Ketones, UA  Negative - Trace mg/dL Negative   Spec Gravity  1.005 - 1.030 1.030   Blood Urine  Negative Large Abnormal    PH Urine  5.0 - 8.0 6.0   Protein Urine  Negative - Trace mg/dL >=300 Abnormal    Urobilinogen Urine  0.2 - 1.0 mg/dL 0.2   Nitrite Urine  Negative Positive Abnormal    Leukocyte Esterase Urine  Negative Large Abnormal    APPEARANCE  Clear cloudy   Color Urine  Yellow yellow   Multistix Lot#  Numeric [id number]   Multistix Expiration Date  Date 10/31/2024              Specimen Collected: 01/26/24  2:45 PM           ASSESSMENT AND PLAN:   Rosaura Hermosillo is a 89 year old female who presents with   Chief Complaint   Patient presents with    Bladder Problem     Bladder infection, painful when starting to urinate, burning sensation,  feeling weak- for a couple of days.        ASSESSMENT:  Encounter Diagnoses   Name Primary?    Urinary tract infection with hematuria, site unspecified Yes    Edema, peripheral     Chronic diastolic congestive heart failure (HCC)     Chronic renal disease, stage IV (HCC)     Venous insufficiency (chronic) (peripheral)    Remote Hx of C. Diff    PLAN:  Requested Prescriptions     Signed Prescriptions Disp Refills    Cephalexin 500 MG Oral Tab 21 tablet 0     Sig: Take 500 mg by mouth 3 (three) times daily.   Eat yogurt between doses of antibiotic or a probiotic  Patient was advised to follow up if there is  no improvement in 2-3 days. Patient was instructed to follow up immediately at Emergency Dept if fever, flank pain, nausea/vomiting occurs.        Patient Instructions   URINARY TRACT INFECTION AVOIDANCE AND PREVENTION    1)  Avoid a full bladder or overfilled bladder. Be sure to wait an addiitional 10-15 seconds after you think you are done, as you may be able to finish going or go again.  \"Count to 10 and go again\". Do not postpone urinating or rush during urination. You want to be able to completely empty your bladder.  2) Please only use water to clean urinary and vaginal areas. NO SOAP or scented products inside the labia or vagina. Wipe from front to back. Check rectal area for stool every time you urinate.  3)  Increase fluid intake at the first sign of infection.  4)  Do not use the same tissue to blow your nose as you do to wipe yourself. Your hands will also be contaminated from this.  5) Avoid your undergarment and pants coming in contact with toilet while seated on toilet.  6) Change undergarment daily or when soiled. Change after sweating, i.e. working out  7) Urinate after cycling, swimming, hot tub or bath tub use.  8) Avoid tight seamed pants and thong use.  9)  Void/urinate after sex. Avoid rear entry approach.  10) Call your clinic or health care provider if symptoms are not resolved by the end of the antibiotic.        The patient indicates understanding of these issues and agrees to the plan.

## 2024-01-27 NOTE — PATIENT INSTRUCTIONS
URINARY TRACT INFECTION AVOIDANCE AND PREVENTION    1)  Avoid a full bladder or overfilled bladder. Be sure to wait an addiitional 10-15 seconds after you think you are done, as you may be able to finish going or go again.  \"Count to 10 and go again\". Do not postpone urinating or rush during urination. You want to be able to completely empty your bladder.  2) Please only use water to clean urinary and vaginal areas. NO SOAP or scented products inside the labia or vagina. Wipe from front to back. Check rectal area for stool every time you urinate.  3)  Increase fluid intake at the first sign of infection.  4)  Do not use the same tissue to blow your nose as you do to wipe yourself. Your hands will also be contaminated from this.  5) Avoid your undergarment and pants coming in contact with toilet while seated on toilet.  6) Change undergarment daily or when soiled. Change after sweating, i.e. working out  7) Urinate after cycling, swimming, hot tub or bath tub use.  8) Avoid tight seamed pants and thong use.  9)  Void/urinate after sex. Avoid rear entry approach.  10) Call your clinic or health care provider if symptoms are not resolved by the end of the antibiotic.

## 2024-01-29 ENCOUNTER — TELEPHONE (OUTPATIENT)
Dept: INTERNAL MEDICINE CLINIC | Facility: CLINIC | Age: 89
End: 2024-01-29

## 2024-01-29 RX ORDER — CEPHALEXIN 500 MG/1
500 TABLET ORAL 3 TIMES DAILY
Qty: 21 TABLET | Refills: 0 | OUTPATIENT
Start: 2024-01-29

## 2024-01-29 RX ORDER — CEPHALEXIN 500 MG/1
500 CAPSULE ORAL 3 TIMES DAILY
Qty: 21 CAPSULE | Refills: 0 | Status: SHIPPED | OUTPATIENT
Start: 2024-01-29 | End: 2024-02-05

## 2024-01-29 RX ORDER — PANTOPRAZOLE SODIUM 40 MG/1
40 TABLET, DELAYED RELEASE ORAL
Qty: 90 TABLET | Refills: 0 | Status: SHIPPED | OUTPATIENT
Start: 2024-01-29

## 2024-01-29 NOTE — TELEPHONE ENCOUNTER
Received fax from pharmacy and requesting a Drug Change Request on this medication: CEPHALEXIN 500 MG TABLETS.   Plan does not cover medication prescribed.   Per Rx benefit plan alternative medications include: CEPHALEXIN CAPSULE  Please advise?     Future Appt. 09/07/2024 w/ Philipp     Last Visit: 01/26/2024 w/ BRYANNA Fraser     Medication Requested:  Requested Prescriptions     Pending Prescriptions Disp Refills    Cephalexin 500 MG Oral Tab 21 tablet 0     Sig: Take 500 mg by mouth 3 (three) times daily.        Last refill:   Cephalexin 500 MG Oral Tab 21 tablet 0 1/26/2024

## 2024-01-31 ENCOUNTER — TELEPHONE (OUTPATIENT)
Dept: INTERNAL MEDICINE CLINIC | Facility: CLINIC | Age: 89
End: 2024-01-31

## 2024-01-31 NOTE — TELEPHONE ENCOUNTER
Aly, from [facility], called requesting Dr. Segal's authorization for a two month continuation of patient's home health services.  Patient currently has two weeks left on her home health orders.    A verbal authorization is sufficient; please call Aly at:    (p) [phone]    KG

## 2024-02-01 ENCOUNTER — OFFICE VISIT (OUTPATIENT)
Dept: HEMATOLOGY/ONCOLOGY | Facility: HOSPITAL | Age: 89
End: 2024-02-01
Attending: INTERNAL MEDICINE
Payer: MEDICARE

## 2024-02-01 VITALS
WEIGHT: 212 LBS | TEMPERATURE: 98 F | DIASTOLIC BLOOD PRESSURE: 53 MMHG | BODY MASS INDEX: 37.56 KG/M2 | SYSTOLIC BLOOD PRESSURE: 148 MMHG | RESPIRATION RATE: 18 BRPM | OXYGEN SATURATION: 95 % | HEIGHT: 63 IN | HEART RATE: 55 BPM

## 2024-02-01 DIAGNOSIS — Z51.81 ANTICOAGULATION MANAGEMENT ENCOUNTER: ICD-10-CM

## 2024-02-01 DIAGNOSIS — I26.99 PULMONARY EMBOLISM, OTHER, UNSPECIFIED CHRONICITY, UNSPECIFIED WHETHER ACUTE COR PULMONALE PRESENT (HCC): Primary | ICD-10-CM

## 2024-02-01 DIAGNOSIS — Z79.01 ANTICOAGULATION MANAGEMENT ENCOUNTER: ICD-10-CM

## 2024-02-01 DIAGNOSIS — N18.9 CHRONIC KIDNEY DISEASE, UNSPECIFIED CKD STAGE: ICD-10-CM

## 2024-02-01 PROCEDURE — 99214 OFFICE O/P EST MOD 30 MIN: CPT | Performed by: INTERNAL MEDICINE

## 2024-02-01 NOTE — PROGRESS NOTES
Hematology Consult    Recurrent VTE with PE      10/31/23    HPI  89 year old  With current venous thromboembolic disease with pulmonary embolus.  Most recently diagnosed October 2023    She is currently on apixaban    She had previous episode 2021 treated with definitive course of anticoagulation    She is doing well on apixaban    Did not have any removable provoking factors    Past Medical History:   Diagnosis Date    Abnormal heart rhythm     Cataract 2010    extraction and lens implantation per NG.    Chronic pulmonary embolism (HCC)     Cystocele 2008    repair management per. NG    Essential hypertension     Hemorrhoid     Hernia, hiatal     High cholesterol     Hyperlipidemia     S/P cholecystectomy 1995    lap cholecystectomy per. NG     Family History   Problem Relation Age of Onset    Heart Disorder Father 47        heart attack cause of death    Musculo-skelatal Disorder Mother         osteoporosis     Social History     Socioeconomic History    Marital status:    Tobacco Use    Smoking status: Former     Packs/day: .5     Types: Cigarettes    Smokeless tobacco: Never    Tobacco comments:     smoked for 2 months only when she was 16 years old   Vaping Use    Vaping Use: Never used   Substance and Sexual Activity    Alcohol use: Yes     Comment: Rarely wine 1 glass    Drug use: Never   Other Topics Concern    Caffeine Concern No       Blood pressure 148/53, pulse 55, temperature 98.2 °F (36.8 °C), temperature source Oral, resp. rate 18, height 1.6 m (5' 3\"), weight 96.2 kg (212 lb), SpO2 95%.  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  Most recent episode unprovoked occurring off anticoagulation October 2023. She has CKD  - Recommend preventative  dose of apixaban indefinitely.  0.5 mg twice daily.  Orders placed    Return to clinic in 1 year

## 2024-02-02 ENCOUNTER — LAB ENCOUNTER (OUTPATIENT)
Dept: LAB | Age: 89
End: 2024-02-02
Attending: INTERNAL MEDICINE
Payer: MEDICARE

## 2024-02-02 DIAGNOSIS — I50.33 ACUTE ON CHRONIC DIASTOLIC HEART FAILURE (HCC): Primary | ICD-10-CM

## 2024-02-02 LAB
ANION GAP SERPL CALC-SCNC: 7 MMOL/L (ref 0–18)
BNP SERPL-MCNC: 145 PG/ML
BUN BLD-MCNC: 18 MG/DL (ref 9–23)
BUN/CREAT SERPL: 15.7 (ref 10–20)
CALCIUM BLD-MCNC: 9.2 MG/DL (ref 8.7–10.4)
CHLORIDE SERPL-SCNC: 108 MMOL/L (ref 98–112)
CO2 SERPL-SCNC: 29 MMOL/L (ref 21–32)
CREAT BLD-MCNC: 1.15 MG/DL
EGFRCR SERPLBLD CKD-EPI 2021: 45 ML/MIN/1.73M2 (ref 60–?)
FASTING STATUS PATIENT QL REPORTED: NO
GLUCOSE BLD-MCNC: 104 MG/DL (ref 70–99)
OSMOLALITY SERPL CALC.SUM OF ELEC: 300 MOSM/KG (ref 275–295)
POTASSIUM SERPL-SCNC: 3.5 MMOL/L (ref 3.5–5.1)
SODIUM SERPL-SCNC: 144 MMOL/L (ref 136–145)

## 2024-02-02 PROCEDURE — 83880 ASSAY OF NATRIURETIC PEPTIDE: CPT

## 2024-02-02 PROCEDURE — 80048 BASIC METABOLIC PNL TOTAL CA: CPT

## 2024-02-02 PROCEDURE — 36415 COLL VENOUS BLD VENIPUNCTURE: CPT

## 2024-02-19 ENCOUNTER — LAB ENCOUNTER (OUTPATIENT)
Dept: LAB | Age: 89
End: 2024-02-19
Attending: NURSE PRACTITIONER
Payer: MEDICARE

## 2024-02-19 DIAGNOSIS — I50.33 ACUTE ON CHRONIC DIASTOLIC HEART FAILURE (HCC): Primary | ICD-10-CM

## 2024-02-19 LAB
ANION GAP SERPL CALC-SCNC: 6 MMOL/L (ref 0–18)
BUN BLD-MCNC: 23 MG/DL (ref 9–23)
BUN/CREAT SERPL: 17.7 (ref 10–20)
CALCIUM BLD-MCNC: 9.6 MG/DL (ref 8.7–10.4)
CHLORIDE SERPL-SCNC: 106 MMOL/L (ref 98–112)
CO2 SERPL-SCNC: 29 MMOL/L (ref 21–32)
CREAT BLD-MCNC: 1.3 MG/DL
EGFRCR SERPLBLD CKD-EPI 2021: 39 ML/MIN/1.73M2 (ref 60–?)
FASTING STATUS PATIENT QL REPORTED: YES
GLUCOSE BLD-MCNC: 89 MG/DL (ref 70–99)
OSMOLALITY SERPL CALC.SUM OF ELEC: 295 MOSM/KG (ref 275–295)
POTASSIUM SERPL-SCNC: 4.1 MMOL/L (ref 3.5–5.1)
SODIUM SERPL-SCNC: 141 MMOL/L (ref 136–145)

## 2024-02-19 PROCEDURE — 36415 COLL VENOUS BLD VENIPUNCTURE: CPT

## 2024-02-19 PROCEDURE — 80048 BASIC METABOLIC PNL TOTAL CA: CPT

## 2024-03-04 ENCOUNTER — LAB ENCOUNTER (OUTPATIENT)
Dept: LAB | Age: 89
End: 2024-03-04
Attending: NURSE PRACTITIONER
Payer: MEDICARE

## 2024-03-04 DIAGNOSIS — I50.31 ACUTE HEART FAILURE WITH PRESERVED EJECTION FRACTION (HCC): Primary | ICD-10-CM

## 2024-03-04 DIAGNOSIS — N39.0 RECURRENT UTI: ICD-10-CM

## 2024-03-04 LAB
ANION GAP SERPL CALC-SCNC: 8 MMOL/L (ref 0–18)
BUN BLD-MCNC: 28 MG/DL (ref 9–23)
BUN/CREAT SERPL: 19.6 (ref 10–20)
CALCIUM BLD-MCNC: 9.4 MG/DL (ref 8.7–10.4)
CHLORIDE SERPL-SCNC: 107 MMOL/L (ref 98–112)
CO2 SERPL-SCNC: 28 MMOL/L (ref 21–32)
CREAT BLD-MCNC: 1.43 MG/DL
EGFRCR SERPLBLD CKD-EPI 2021: 35 ML/MIN/1.73M2 (ref 60–?)
FASTING STATUS PATIENT QL REPORTED: NO
GLUCOSE BLD-MCNC: 111 MG/DL (ref 70–99)
OSMOLALITY SERPL CALC.SUM OF ELEC: 302 MOSM/KG (ref 275–295)
POTASSIUM SERPL-SCNC: 4.2 MMOL/L (ref 3.5–5.1)
SODIUM SERPL-SCNC: 143 MMOL/L (ref 136–145)

## 2024-03-04 PROCEDURE — 80048 BASIC METABOLIC PNL TOTAL CA: CPT

## 2024-03-04 PROCEDURE — 36415 COLL VENOUS BLD VENIPUNCTURE: CPT

## 2024-03-04 RX ORDER — FUROSEMIDE 40 MG/1
40 TABLET ORAL 2 TIMES DAILY
Qty: 30 TABLET | Refills: 5 | Status: SHIPPED | OUTPATIENT
Start: 2024-03-04

## 2024-03-04 NOTE — TELEPHONE ENCOUNTER
Future Appt. 09/07/2024    Last Visit: 01/26/2024 w/ NP Bria     Medication Requested:  Requested Prescriptions     Pending Prescriptions Disp Refills    FUROSEMIDE 40 MG Oral Tab [Pharmacy Med Name: FUROSEMIDE 40MG TABLETS] 30 tablet 5     Sig: TAKE 1 TABLET(40 MG) BY MOUTH TWICE DAILY        Last refill:      furosemide 40 MG Oral Tab 30 tablet 5 8/18/2023       Hypertension Medications Protocol [id number]/2024 11:00 AM   Protocol Details Last BP reading less than 140/90    EGFRCR or GFRNAA > 50    CMP or BMP in past 12 months    In person appointment or virtual visit in the past 12 mos or appointment in next 3 mos

## 2024-04-10 ENCOUNTER — TELEPHONE (OUTPATIENT)
Dept: INTERNAL MEDICINE CLINIC | Facility: CLINIC | Age: 89
End: 2024-04-10

## 2024-04-10 NOTE — TELEPHONE ENCOUNTER
Amber from [facility] called to request a verbal authorization to re-certify home health services for the patient.    Re-certification process will be this Friday, 04/12/2024;  please call Amber with the verbal authorization prior to 04/12/2024:    (p) [phone]    KG

## 2024-04-10 NOTE — TELEPHONE ENCOUNTER
Amber from [facility] is aware to recertify pt per Dr. Segal.

## 2024-04-12 ENCOUNTER — TELEPHONE (OUTPATIENT)
Dept: INTERNAL MEDICINE CLINIC | Facility: CLINIC | Age: 89
End: 2024-04-12

## 2024-04-12 NOTE — TELEPHONE ENCOUNTER
Amber from [facility] called. She is seeing this patient today at 11 am.  And is asking for a verbal okay from Dr. Segal to please discharge her from home health.    This is the patient's request as the patient said she is taking her own vitals and knows her medications to take.

## 2024-05-08 ENCOUNTER — LAB ENCOUNTER (OUTPATIENT)
Dept: LAB | Age: 89
End: 2024-05-08
Attending: INTERNAL MEDICINE
Payer: MEDICARE

## 2024-05-08 DIAGNOSIS — N39.0 RECURRENT UTI: ICD-10-CM

## 2024-05-08 PROCEDURE — 87186 SC STD MICRODIL/AGAR DIL: CPT

## 2024-05-08 PROCEDURE — 87086 URINE CULTURE/COLONY COUNT: CPT

## 2024-05-08 PROCEDURE — 87077 CULTURE AEROBIC IDENTIFY: CPT

## 2024-05-08 NOTE — TELEPHONE ENCOUNTER
CT scan of the abdomen revealed no significant abnormalities. Is she still having abdominal pain? .  If so refer to GI 179.8

## 2024-05-13 RX ORDER — PANTOPRAZOLE SODIUM 40 MG/1
40 TABLET, DELAYED RELEASE ORAL
Qty: 90 TABLET | Refills: 0 | Status: SHIPPED | OUTPATIENT
Start: 2024-05-13

## 2024-05-15 ENCOUNTER — APPOINTMENT (OUTPATIENT)
Dept: CT IMAGING | Facility: HOSPITAL | Age: 89
DRG: 390 | End: 2024-05-15
Attending: EMERGENCY MEDICINE

## 2024-05-15 ENCOUNTER — HOSPITAL ENCOUNTER (INPATIENT)
Facility: HOSPITAL | Age: 89
LOS: 8 days | Discharge: HOME HEALTH | DRG: 390 | End: 2024-05-23
Attending: EMERGENCY MEDICINE | Admitting: HOSPITALIST

## 2024-05-15 ENCOUNTER — APPOINTMENT (OUTPATIENT)
Dept: GENERAL RADIOLOGY | Facility: HOSPITAL | Age: 89
DRG: 390 | End: 2024-05-15
Attending: EMERGENCY MEDICINE

## 2024-05-15 DIAGNOSIS — K56.609 SBO (SMALL BOWEL OBSTRUCTION) (HCC): Primary | ICD-10-CM

## 2024-05-15 LAB
ALBUMIN SERPL-MCNC: 4.5 G/DL (ref 3.2–4.8)
ALP LIVER SERPL-CCNC: 79 U/L
ALT SERPL-CCNC: 14 U/L
ANION GAP SERPL CALC-SCNC: 9 MMOL/L (ref 0–18)
AST SERPL-CCNC: 24 U/L (ref ?–34)
BASOPHILS # BLD AUTO: 0.05 X10(3) UL (ref 0–0.2)
BASOPHILS NFR BLD AUTO: 0.8 %
BILIRUB DIRECT SERPL-MCNC: 0.2 MG/DL (ref ?–0.3)
BILIRUB SERPL-MCNC: 0.5 MG/DL (ref 0.2–0.9)
BILIRUB UR QL: NEGATIVE
BUN BLD-MCNC: 31 MG/DL (ref 9–23)
BUN/CREAT SERPL: 17.3 (ref 10–20)
CALCIUM BLD-MCNC: 9.9 MG/DL (ref 8.7–10.4)
CHLORIDE SERPL-SCNC: 106 MMOL/L (ref 98–112)
CLARITY UR: CLEAR
CO2 SERPL-SCNC: 26 MMOL/L (ref 21–32)
CREAT BLD-MCNC: 1.79 MG/DL
DEPRECATED RDW RBC AUTO: 42.5 FL (ref 35.1–46.3)
EGFRCR SERPLBLD CKD-EPI 2021: 27 ML/MIN/1.73M2 (ref 60–?)
EOSINOPHIL # BLD AUTO: 0.03 X10(3) UL (ref 0–0.7)
EOSINOPHIL NFR BLD AUTO: 0.5 %
ERYTHROCYTE [DISTWIDTH] IN BLOOD BY AUTOMATED COUNT: 12.9 % (ref 11–15)
GLUCOSE BLD-MCNC: 154 MG/DL (ref 70–99)
GLUCOSE UR-MCNC: NORMAL MG/DL
HCT VFR BLD AUTO: 45.2 %
HGB BLD-MCNC: 14.5 G/DL
HGB UR QL STRIP.AUTO: NEGATIVE
IMM GRANULOCYTES # BLD AUTO: 0.02 X10(3) UL (ref 0–1)
IMM GRANULOCYTES NFR BLD: 0.3 %
KETONES UR-MCNC: NEGATIVE MG/DL
LEUKOCYTE ESTERASE UR QL STRIP.AUTO: 25
LIPASE SERPL-CCNC: 69 U/L (ref 13–75)
LYMPHOCYTES # BLD AUTO: 1.43 X10(3) UL (ref 1–4)
LYMPHOCYTES NFR BLD AUTO: 23.3 %
MCH RBC QN AUTO: 28.9 PG (ref 26–34)
MCHC RBC AUTO-ENTMCNC: 32.1 G/DL (ref 31–37)
MCV RBC AUTO: 90.2 FL
MONOCYTES # BLD AUTO: 0.3 X10(3) UL (ref 0.1–1)
MONOCYTES NFR BLD AUTO: 4.9 %
NEUTROPHILS # BLD AUTO: 4.32 X10 (3) UL (ref 1.5–7.7)
NEUTROPHILS # BLD AUTO: 4.32 X10(3) UL (ref 1.5–7.7)
NEUTROPHILS NFR BLD AUTO: 70.2 %
NITRITE UR QL STRIP.AUTO: NEGATIVE
OSMOLALITY SERPL CALC.SUM OF ELEC: 302 MOSM/KG (ref 275–295)
PH UR: 5 [PH] (ref 5–8)
PLATELET # BLD AUTO: 197 10(3)UL (ref 150–450)
POTASSIUM SERPL-SCNC: 4 MMOL/L (ref 3.5–5.1)
PROT SERPL-MCNC: 8.1 G/DL (ref 5.7–8.2)
PROT UR-MCNC: NEGATIVE MG/DL
RBC # BLD AUTO: 5.01 X10(6)UL
SODIUM SERPL-SCNC: 141 MMOL/L (ref 136–145)
SP GR UR STRIP: 1.01 (ref 1–1.03)
UROBILINOGEN UR STRIP-ACNC: NORMAL
WBC # BLD AUTO: 6.2 X10(3) UL (ref 4–11)

## 2024-05-15 PROCEDURE — 99222 1ST HOSP IP/OBS MODERATE 55: CPT | Performed by: SURGERY

## 2024-05-15 PROCEDURE — 71045 X-RAY EXAM CHEST 1 VIEW: CPT | Performed by: EMERGENCY MEDICINE

## 2024-05-15 PROCEDURE — 74176 CT ABD & PELVIS W/O CONTRAST: CPT | Performed by: EMERGENCY MEDICINE

## 2024-05-15 PROCEDURE — 99223 1ST HOSP IP/OBS HIGH 75: CPT | Performed by: HOSPITALIST

## 2024-05-15 RX ORDER — MORPHINE SULFATE 4 MG/ML
4 INJECTION, SOLUTION INTRAMUSCULAR; INTRAVENOUS EVERY 2 HOUR PRN
Status: DISCONTINUED | OUTPATIENT
Start: 2024-05-15 | End: 2024-05-21

## 2024-05-15 RX ORDER — MORPHINE SULFATE 2 MG/ML
1 INJECTION, SOLUTION INTRAMUSCULAR; INTRAVENOUS EVERY 2 HOUR PRN
Status: DISCONTINUED | OUTPATIENT
Start: 2024-05-15 | End: 2024-05-21

## 2024-05-15 RX ORDER — ALBUTEROL SULFATE 90 UG/1
2 AEROSOL, METERED RESPIRATORY (INHALATION) EVERY 4 HOURS PRN
Status: DISCONTINUED | OUTPATIENT
Start: 2024-05-15 | End: 2024-05-23

## 2024-05-15 RX ORDER — ONDANSETRON 2 MG/ML
4 INJECTION INTRAMUSCULAR; INTRAVENOUS EVERY 6 HOURS PRN
Status: DISCONTINUED | OUTPATIENT
Start: 2024-05-15 | End: 2024-05-23

## 2024-05-15 RX ORDER — DEXTROSE MONOHYDRATE, SODIUM CHLORIDE, AND POTASSIUM CHLORIDE 50; 1.49; 4.5 G/1000ML; G/1000ML; G/1000ML
INJECTION, SOLUTION INTRAVENOUS CONTINUOUS
Status: DISCONTINUED | OUTPATIENT
Start: 2024-05-15 | End: 2024-05-21

## 2024-05-15 RX ORDER — MORPHINE SULFATE 2 MG/ML
2 INJECTION, SOLUTION INTRAMUSCULAR; INTRAVENOUS EVERY 2 HOUR PRN
Status: DISCONTINUED | OUTPATIENT
Start: 2024-05-15 | End: 2024-05-21

## 2024-05-15 RX ORDER — POTASSIUM CHLORIDE 14.9 MG/ML
INJECTION INTRAVENOUS
Status: DISPENSED
Start: 2024-05-15 | End: 2024-05-16

## 2024-05-15 RX ORDER — ONDANSETRON 2 MG/ML
4 INJECTION INTRAMUSCULAR; INTRAVENOUS ONCE
Status: COMPLETED | OUTPATIENT
Start: 2024-05-15 | End: 2024-05-15

## 2024-05-15 RX ORDER — ACETAMINOPHEN 650 MG/1
650 SUPPOSITORY RECTAL EVERY 6 HOURS PRN
Status: DISCONTINUED | OUTPATIENT
Start: 2024-05-15 | End: 2024-05-23

## 2024-05-15 RX ORDER — ACETAMINOPHEN 650 MG/1
1300 SUPPOSITORY RECTAL ONCE
Status: COMPLETED | OUTPATIENT
Start: 2024-05-15 | End: 2024-05-15

## 2024-05-15 NOTE — ED QUICK NOTES
Orders for admission, patient is aware of plan and ready to go upstairs. Any questions, please call ED RN Patty at extension [phone].     Patient Covid vaccination status: Fully vaccinated     COVID Test Ordered in ED: None    COVID Suspicion at Admission: N/A    Running Infusions:  None    Mental Status/LOC at time of transport: alert and oriented, pleasantly conversational    Other pertinent information: Pt has been free of abdominal pain since arrival to ED; Nausea managed with single dose of Zofran. No episodes of vomiting.  CIWA score: N/A   NIH score:  N/A

## 2024-05-15 NOTE — ED PROVIDER NOTES
Patient Seen in: [facility] Emergency Department      History     Chief Complaint   Patient presents with    Abdomen/Flank Pain     Stated Complaint: nausea, abd pain, currently taking Cipro for UTI    Subjective:   HPI    89-year-old female presents for the evaluation of abdominal pain.  Patient reports since last night she has had abdominal pain and nausea.  Denies vomiting but reports last night she \"coughed up some particles of food.\"  She reports this morning she is feeling much better, no longer has any abdominal pain.  No fever, diarrhea, recent trauma.    Objective:   Past Medical History:    Abnormal heart rhythm    Cataract    extraction and lens implantation per NG.    Chronic pulmonary embolism (HCC)    Cystocele    repair management per. NG    Essential hypertension    Hemorrhoid    Hernia, hiatal    High cholesterol    Hyperlipidemia    S/P cholecystectomy    lap cholecystectomy per. NG              Past Surgical History:   Procedure Laterality Date    Breast biopsy Right     2011    Colonoscopy      Electrocardiogram, complete  05/09/2013    scanned to media tab    Removal gallbladder                  Social History     Socioeconomic History    Marital status:    Tobacco Use    Smoking status: Former     Current packs/day: 0.50     Types: Cigarettes    Smokeless tobacco: Never    Tobacco comments:     smoked for 2 months only when she was 16 years old   Vaping Use    Vaping status: Never Used   Substance and Sexual Activity    Alcohol use: Yes     Comment: Rarely wine 1 glass    Drug use: Never   Other Topics Concern    Caffeine Concern No     Social Determinants of Health     Financial Resource Strain: Low Risk  (12/11/2023)    Financial Resource Strain     Difficulty of Paying Living Expenses: Not hard at all     Med Affordability: No   Food Insecurity: No Food Insecurity (5/15/2024)    Food Insecurity     Food Insecurity: Never true   Transportation Needs: No Transportation Needs  (5/15/2024)    Transportation Needs     Lack of Transportation: No   Housing Stability: Low Risk  (5/15/2024)    Housing Stability     Housing Instability: No              Review of Systems    Positive for stated complaint: nausea, abd pain, currently taking Cipro for UTI  Other systems are as noted in HPI.  Constitutional and vital signs reviewed.      All other systems reviewed and negative except as noted above.    Physical Exam     ED Triage Vitals [05/15/24 0732]   /70   Pulse 64   Resp 16   Temp 97.7 °F (36.5 °C)   Temp src Oral   SpO2 100 %   O2 Device None (Room air)       Current Vitals:   Vital Signs  BP: 138/75  Pulse: 70  Resp: 17  Temp: 98 °F (36.7 °C)  Temp src: Oral  MAP (mmHg): 94    Oxygen Therapy  SpO2: 99 %  O2 Device: None (Room air)            Physical Exam  Vitals and nursing note reviewed.   Constitutional:       General: She is not in acute distress.     Appearance: She is well-developed.   HENT:      Head: Normocephalic and atraumatic.   Eyes:      Conjunctiva/sclera: Conjunctivae normal.   Cardiovascular:      Rate and Rhythm: Normal rate and regular rhythm.      Heart sounds: Normal heart sounds.   Pulmonary:      Effort: Pulmonary effort is normal. No respiratory distress.      Breath sounds: Normal breath sounds.   Abdominal:      General: Bowel sounds are normal. There is no distension.      Palpations: Abdomen is soft.      Tenderness: There is no abdominal tenderness. There is no guarding or rebound.   Musculoskeletal:         General: Normal range of motion.      Cervical back: Normal range of motion and neck supple.   Skin:     General: Skin is warm and dry.      Findings: No rash.   Neurological:      General: No focal deficit present.      Mental Status: She is alert and oriented to person, place, and time.               ED Course     Labs Reviewed   BASIC METABOLIC PANEL (8) - Abnormal; Notable for the following components:       Result Value    Glucose 154 (*)     BUN 31  (*)     Creatinine 1.79 (*)     Calculated Osmolality 302 (*)     eGFR-Cr 27 (*)     All other components within normal limits   URINALYSIS WITH CULTURE REFLEX - Abnormal; Notable for the following components:    Leukocyte Esterase Urine 25 (*)     Squamous Epi. Cells Few (*)     All other components within normal limits   HEPATIC FUNCTION PANEL (7) - Normal   LIPASE - Normal   CBC WITH DIFFERENTIAL WITH PLATELET    Narrative:     The following orders were created for panel order CBC With Differential With Platelet.  Procedure                               Abnormality         Status                     ---------                               -----------         ------                     CBC W/ DIFFERENTIAL[[id number]]                              Final result                 Please view results for these tests on the individual orders.   RAINBOW DRAW LAVENDER   RAINBOW DRAW LIGHT GREEN   RAINBOW DRAW BLUE   URINE CULTURE, ROUTINE   CBC W/ DIFFERENTIAL             Imaging Results Available and Reviewed while in ED:   XR CHEST AP PORTABLE  (CPT=71045)   Final Result   PROCEDURE: XR CHEST AP PORTABLE  (CPT=71045)   TIME: 1257.         COMPARISON: [facility], XR CHEST AP PORTABLE (CPT=71045),    10/13/2023, 11:43 PM.  [facility], CT CHEST PE AORTA (IV    ONLY) (CPT=71260), 10/14/2023, 1:50 AM.  [facility], XR    CHEST PA + LAT CHEST (CPT=71046),    12/02/2023, 2:57 PM.  [facility], XR CHEST AP PORTABLE    (CPT=71045), 12/04/2023, 7:10 PM.  [facility], XR CHEST AP    PORTABLE (CPT=71045), 12/07/2023, 2:33 PM.       INDICATIONS: NG tube placement.       TECHNIQUE:   Single view.         FINDINGS:    POSITION: The patient is slightly rotated to the right.   DEVICES: There is a nasogastric tube with tip projecting in the region of    the gastric body. The sidehole port projects beyond the gastroesophageal    junction.   CARDIAC/VASC: The  cardiomediastinal silhouette is accentuated by AP    technique, but likely stably enlarged. There is mild tortuosity of the    thoracic aorta with peripheral atherosclerotic vascular calcification. The    pulmonary vascularity is within normal    limits.    MEDIAST/ROSITA: No visible mass or adenopathy.    LUNGS/PLEURA: No airspace consolidation, pleural effusion, or pneumothorax    is evident.     BONES: Multi-apex scoliosis and multilevel degenerative changes of the    thoracic spine are apparent. There are mild degenerative changes of the    shoulders bilaterally.    OTHER: Surgical clips project over the right upper quadrant, likely from    prior cholecystectomy. Nonspecific radiodensity is seen projecting in the    lateral right breast soft tissues.                       =====   CONCLUSION:    1. Stable chest without radiographically evident acute intrathoracic    process.       2. Nasogastric tube with tip projecting in the gastric body.               Dictated by (CST): Alec Bose MD on 5/15/2024 at 1:04 PM        Finalized by (CST): Alec Bose MD on 5/15/2024 at 1:06 PM               CT ABDOMEN+PELVIS(CPT=74176)   Final Result   PROCEDURE:   CT ABDOMEN+PELVIS(CPT=74176)       COMPARISON:   [facility], CT ABDOMEN+PELVIS (CPT=74176),    6/22/2023, 6:14 PM.  [facility], CT ABDOMEN PELVIS IV    CONTRAST NO ORAL (ER), 5/06/2021, 6:26 PM.       INDICATIONS:   nausea, abd pain, currently taking Cipro for UTI       TECHNIQUE: CT images of the abdomen and pelvis were obtained without    intravenous contrast material.  Automated exposure control for dose    reduction was used. Adjustment of the mA and/or kV was done based on the    patient's size. Use of iterative    reconstruction technique for dose reduction was used.  Dose information is    transmitted to the ACR (American College of Radiology) NRDR (National    Radiology Data Registry) which includes the Dose Index Registry.        FINDINGS:    LOWER THORAX:  Heart is top-normal in size.  Coronary artery    calcifications are not seen.  No visible pulmonary or pleural disease.   LIVER:   No enlargement, atrophy, abnormal density, or significant focal    lesion.    GALLBLADDER: Surgically absent.   BILIARY:   No visible dilatation or calcification.     PANCREAS:   No lesion, fluid collection, ductal dilatation, or atrophy.     SPLEEN:   No enlargement or focal lesion.     ADRENALS:   No mass or enlargement.     KIDNEYS:   No mass, obstruction, or calcification.  Fluid attenuation    cysts in the left kidney.     RETROPERITONEUM:   No mass or enlarged adenopathy.     AORTA/VASCULAR:   No aneurysm.  Moderate calcified atherosclerosis.     Multiple small mural abnormalities, possibly focal dissections.   PERITONEUM: No free fluid or air.   GI TRACT/MESENTERY:    The appendix is unremarkable.  Dilated fluid-filled    loops of small bowel measure up to 3.2 cm with transition point in the    lower central abdomen series 2, image 104. Colonic diverticulosis without    evidence of acute diverticulitis.   URINARY BLADDER: Unremarkable.   REPRODUCTIVE ORGANS: The uterus is present.  There is no adnexal mass.   ABDOMINAL WALL:   No acute abnormality.  Fat containing ventral hernia.   BONES:  No acute fracture.  Moderate spondylosis.  Anterolisthesis of L4    on L5 measuring 4 mm.  Retrolisthesis of L1 on L2 measuring 3 mm.                        =====   CONCLUSION:        Small bowel obstruction with transition point at the lower central    abdomen, etiology indeterminate.  The appendix is unremarkable.     Diverticulosis without evidence of acute diverticulitis.       Additional chronic or incidental findings are described in the body of    this report.                       Dictated by (CST): Hola Isbell MD on 5/15/2024 at 10:41 AM        Finalized by (CST): Hola Isbell MD on 5/15/2024 at 11:01 AM                   ED Medications Administered:    Medications   potassium chloride 20 mEq in dextrose 5%-sodium chloride 0.45% 1000mL infusion premix (has no administration in time range)   ondansetron (Zofran) 4 MG/2ML injection 4 mg (has no administration in time range)   morphINE PF 2 MG/ML injection 1 mg (has no administration in time range)     Or   morphINE PF 2 MG/ML injection 2 mg (has no administration in time range)     Or   morphINE PF 4 MG/ML injection 4 mg (has no administration in time range)   acetaminophen (Tylenol) rectal suppository 650 mg (has no administration in time range)   albuterol (Ventolin HFA) 108 (90 Base) MCG/ACT inhaler 2 puff (has no administration in time range)   potassium chloride 20 mEq/100mL IVPB premix (  Not Given 5/15/24 1430)   ondansetron (Zofran) 4 MG/2ML injection 4 mg (4 mg Intravenous Given 5/15/24 0736)   acetaminophen (Tylenol) rectal suppository 1,300 mg (650 mg Rectal Given 5/15/24 1240)       Vitals:    05/15/24 1050 05/15/24 1300 05/15/24 1349 05/15/24 1403   BP: 123/66 151/73 138/75    BP Location:   Right arm    Pulse: 64 66 70    Resp: 16 16 17    Temp:   98 °F (36.7 °C)    TempSrc:   Oral    SpO2: 96% 99% 99%    Weight:    89.8 kg   Height:         *I personally reviewed and interpreted all ED vitals.           MDM        Admission disposition: 5/15/2024 11:19 AM                                        Medical Decision Making  Differential diagnosis includes but is not limited to gastritis, early gastroenteritis, foodborne illness, obstruction, malignancy, electrolyte imbalance, dehydration    Well-appearing patient, soft and nontender abdomen on exam, CT as above.  Discussed with Dr. Hathaway, no further recommendations to the emergency department.  Discussed with Dr. Foote. who saw the patient in the emergency department, request that NG tube be placed.    Problems Addressed:  SBO (small bowel obstruction) (HCC): acute illness or injury    Amount and/or Complexity of Data Reviewed  Independent Historian:  EMS  External Data Reviewed: labs.     Details: CBC stable to 12/18/2023, BMP stable compared to 3/4/2024  Labs: ordered.  Radiology: ordered.  Discussion of management or test interpretation with external provider(s): Discussed with hospitalist and surgery        Disposition and Plan     Clinical Impression:  1. SBO (small bowel obstruction) (HCC)         Disposition:  Admit  5/15/2024 11:19 am    Follow-up:  No follow-up provider specified.  We recommend that you schedule follow up care with a primary care provider within the next three months to obtain basic health screening including reassessment of your blood pressure.      Medications Prescribed:  Current Discharge Medication List                            Hospital Problems       Present on Admission  Date Reviewed: 2/1/2024            ICD-10-CM Noted POA    * (Principal) SBO (small bowel obstruction) (HCC) K56.609 5/15/2024 Unknown    Small bowel obstruction (HCC) K56.609 5/15/2024 Unknown

## 2024-05-15 NOTE — H&P
[facility]    PATIENT'S NAME: ZACHARY SWARTZ   ATTENDING PHYSICIAN: Alaina Ham MD   PATIENT ACCOUNT#:   [id number]    LOCATION:  [address]  MEDICAL RECORD #:   [id number]       YOB: 1933  ADMISSION DATE:       05/15/2024    HISTORY AND PHYSICAL EXAMINATION    CHIEF COMPLAINT:  Small-bowel obstruction.    HISTORY OF PRESENT ILLNESS:  Patient is a 89-year-old  female who presented to the emergency department for evaluation of abdominal pain, dry heaving, and acid reflux symptoms since yesterday.  CBC and chemistry are unremarkable.  GFR is slightly below her baseline at 27.  Liver function tests and lipase were negative.  CT scan of the abdomen shows small-bowel obstruction with transition point in the lower central abdomen, etiology indeterminate.  Appendix unremarkable.  Diverticulosis without complications.  Patient will be admitted to the hospital for further management.    PAST MEDICAL HISTORY:  Hypertension; hyperlipidemia; osteoarthritis; chronic kidney disease stage 3; pulmonary embolism, anticoagulated with Eliquis; osteoporosis; paroxysmal supraventricular tachycardia; degenerative joint disease of the lumbar spine; obesity; left ventricular diastolic dysfunction; gastroesophageal reflux disease; and hiatal hernia.    PAST SURGICAL HISTORY:  Right breast biopsy, rectocele and cystocele repair, cholecystectomy.    MEDICATIONS:  Please see medication reconciliation list.    ALLERGIES:  No known drug allergies.    FAMILY HISTORY:  Positive for coronary artery disease.    SOCIAL HISTORY:  No tobacco, alcohol, or drug use.  Lives with her family.  Independent for basic activities of daily living.    REVIEW OF SYSTEMS:  Patient reports right around 5 to 6 p.m. started developing crescendo-decrescendo abdominal discomfort.  She had a small meal for dinner; and then throughout the night, she has been feeling a burning sensation coming all the way up to her throat  with food particles, did not throw up.  She had last bowel movement yesterday; and today, no bowel movement.  She denies any fever or chills.  Other 12-point review of systems is negative.      PHYSICAL EXAMINATION:    GENERAL:  Alert.  Oriented to time, place, and person.  Moderate distress.  VITAL SIGNS:  Temperature 97.7, pulse 64, respiratory rate 16, blood pressure 123/66, pulse ox 96% on room air.  HEENT:  Atraumatic.  Oropharynx clear.  Dry mucous membranes.  Normal hard and soft palate.  Eyes:  Anicteric sclerae.  NECK:  Supple.  No lymphadenopathy.  Trachea midline.  Full range of motion.  LUNGS:  Clear to auscultation bilaterally.  Normal respiratory effort.  HEART:  Regular rate and rhythm.  S1 and S2 auscultated.  No murmur.  ABDOMEN:  Soft.  Mild to moderate distention.  Mid abdominal ventral hernia without complications, though not reducible manually.  Positive bowel sounds.    EXTREMITIES:  No peripheral edema, clubbing, or cyanosis.  NEUROLOGIC:  Motor and sensory intact.    ASSESSMENT AND PLAN:    1.   Small-bowel obstruction.  Transition point, mid lower abdomen of unclear etiology, possibly related to adhesions.  2.   Dehydration with mild acute on chronic kidney injury.  3.   Hypertension.    Patient will be admitted to general medical floor.  Pain control.  General surgery consult.  NG suction if recommended by General Surgery.  We will start on pain and nausea control, IV fluids.  Monitor hemodynamic status and kidney function.  Further recommendations to follow.    Dictated By Odilia Foote MD  d: 05/15/2024 12:00:50  t: 05/15/2024 12:39:19  Job [id number]  /

## 2024-05-15 NOTE — PROGRESS NOTES
General surgery consult    Chart reviewed, full dictated consult to follow.    Small bowel obstruction, appears partial.  -Plan n.p.o., IV fluids.  Antiemetics as needed.  Will follow

## 2024-05-15 NOTE — PLAN OF CARE
Problem: Patient Centered Care  Goal: Patient preferences are identified and integrated in the patient's plan of care  Description: Interventions:  - What would you like us to know as we care for you? From home alone   - Provide timely, complete, and accurate information to patient/family  - Incorporate patient and family knowledge, values, beliefs, and cultural backgrounds into the planning and delivery of care  - Encourage patient/family to participate in care and decision-making at the level they choose  - Honor patient and family perspectives and choices  Outcome: Progressing     Problem: Patient/Family Goals  Goal: Patient/Family Long Term Goal  Description: Patient's Long Term Goal: stay out of the hospital.     Interventions:  - See additional Care Plan goals for specific interventions  Outcome: Progressing  Goal: Patient/Family Short Term Goal  Description: Patient's Short Term Goal: to go home.     Interventions:  - See additional Care Plan goals for specific interventions  Outcome: Progressing       Ng tube in place set at low intermittent suction. General surgery on consult. NPO. IVF. Call light within reach.

## 2024-05-15 NOTE — ED INITIAL ASSESSMENT (HPI)
Rosaura arrived from home with [facility] for c/o pain across lower abdomen intermittent since last night with associated nausea. States she was started on Cipro and Spironolactone a couple of days ago. Notable umbilical hernia on my exam, soft and non-tender.

## 2024-05-16 ENCOUNTER — TELEPHONE (OUTPATIENT)
Dept: INTERNAL MEDICINE CLINIC | Facility: CLINIC | Age: 89
End: 2024-05-16

## 2024-05-16 LAB
ANION GAP SERPL CALC-SCNC: 5 MMOL/L (ref 0–18)
BASOPHILS # BLD AUTO: 0.06 X10(3) UL (ref 0–0.2)
BASOPHILS NFR BLD AUTO: 1 %
BUN BLD-MCNC: 25 MG/DL (ref 9–23)
BUN/CREAT SERPL: 18.8 (ref 10–20)
CALCIUM BLD-MCNC: 8.9 MG/DL (ref 8.7–10.4)
CHLORIDE SERPL-SCNC: 109 MMOL/L (ref 98–112)
CO2 SERPL-SCNC: 28 MMOL/L (ref 21–32)
CREAT BLD-MCNC: 1.33 MG/DL
DEPRECATED RDW RBC AUTO: 42.9 FL (ref 35.1–46.3)
EGFRCR SERPLBLD CKD-EPI 2021: 38 ML/MIN/1.73M2 (ref 60–?)
EOSINOPHIL # BLD AUTO: 0.2 X10(3) UL (ref 0–0.7)
EOSINOPHIL NFR BLD AUTO: 3.3 %
ERYTHROCYTE [DISTWIDTH] IN BLOOD BY AUTOMATED COUNT: 13.1 % (ref 11–15)
GLUCOSE BLD-MCNC: 106 MG/DL (ref 70–99)
HCT VFR BLD AUTO: 39.6 %
HGB BLD-MCNC: 13.4 G/DL
IMM GRANULOCYTES # BLD AUTO: 0.01 X10(3) UL (ref 0–1)
IMM GRANULOCYTES NFR BLD: 0.2 %
LYMPHOCYTES # BLD AUTO: 2.52 X10(3) UL (ref 1–4)
LYMPHOCYTES NFR BLD AUTO: 42.1 %
MCH RBC QN AUTO: 30.4 PG (ref 26–34)
MCHC RBC AUTO-ENTMCNC: 33.8 G/DL (ref 31–37)
MCV RBC AUTO: 89.8 FL
MONOCYTES # BLD AUTO: 0.52 X10(3) UL (ref 0.1–1)
MONOCYTES NFR BLD AUTO: 8.7 %
NEUTROPHILS # BLD AUTO: 2.68 X10 (3) UL (ref 1.5–7.7)
NEUTROPHILS # BLD AUTO: 2.68 X10(3) UL (ref 1.5–7.7)
NEUTROPHILS NFR BLD AUTO: 44.7 %
OSMOLALITY SERPL CALC.SUM OF ELEC: 299 MOSM/KG (ref 275–295)
PLATELET # BLD AUTO: 170 10(3)UL (ref 150–450)
POTASSIUM SERPL-SCNC: 4.3 MMOL/L (ref 3.5–5.1)
RBC # BLD AUTO: 4.41 X10(6)UL
SODIUM SERPL-SCNC: 142 MMOL/L (ref 136–145)
WBC # BLD AUTO: 6 X10(3) UL (ref 4–11)

## 2024-05-16 PROCEDURE — 99233 SBSQ HOSP IP/OBS HIGH 50: CPT | Performed by: HOSPITALIST

## 2024-05-16 PROCEDURE — 99233 SBSQ HOSP IP/OBS HIGH 50: CPT | Performed by: SURGERY

## 2024-05-16 RX ORDER — HEPARIN SODIUM 5000 [USP'U]/ML
5000 INJECTION, SOLUTION INTRAVENOUS; SUBCUTANEOUS EVERY 12 HOURS SCHEDULED
Status: DISCONTINUED | OUTPATIENT
Start: 2024-05-16 | End: 2024-05-19

## 2024-05-16 NOTE — PLAN OF CARE
Problem: Patient Centered Care  Goal: Patient preferences are identified and integrated in the patient's plan of care  Description: Interventions:  - What would you like us to know as we care for you?   - Provide timely, complete, and accurate information to patient/family  - Incorporate patient and family knowledge, values, beliefs, and cultural backgrounds into the planning and delivery of care  - Encourage patient/family to participate in care and decision-making at the level they choose  - Honor patient and family perspectives and choices  Outcome: Progressing     Problem: Patient/Family Goals  Goal: Patient/Family Long Term Goal  Description: Patient's Long Term Goal: pain management     Interventions:  - Monitor vitals  - Monitor appropriate labs  - Administer medications as ordered  - Follow MD's orders  - Update patient on plan of care   - Discharge planning   - See additional Care Plan goals for specific interventions  Outcome: Progressing  Goal: Patient/Family Short Term Goal  Description: Patient's Short Term Goal: dc from hospital     Interventions:   - Monitor vitals  - Monitor appropriate labs  - Administer medications as ordered  - Follow MD's orders  - Update patient on plan of care   - Discharge planning   - See additional Care Plan goals for specific interventions  Outcome: Progressing     Problem: PAIN - ADULT  Goal: Verbalizes/displays adequate comfort level or patient's stated pain goal  Description: INTERVENTIONS:  - Encourage pt to monitor pain and request assistance  - Assess pain using appropriate pain scale  - Administer analgesics based on type and severity of pain and evaluate response  - Implement non-pharmacological measures as appropriate and evaluate response  - Consider cultural and social influences on pain and pain management  - Manage/alleviate anxiety  - Utilize distraction and/or relaxation techniques  - Monitor for opioid side effects  - Notify MD/LIP if interventions  unsuccessful or patient reports new pain  - Anticipate increased pain with activity and pre-medicate as appropriate  Outcome: Progressing     Problem: SAFETY ADULT - FALL  Goal: Free from fall injury  Description: INTERVENTIONS:  - Assess pt frequently for physical needs  - Identify cognitive and physical deficits and behaviors that affect risk of falls.  - [place] fall precautions as indicated by assessment.  - Educate pt/family on patient safety including physical limitations  - Instruct pt to call for assistance with activity based on assessment  - Modify environment to reduce risk of injury  - Provide assistive devices as appropriate  - Consider OT/PT consult to assist with strengthening/mobility  - Encourage toileting schedule  Outcome: Progressing     Problem: GASTROINTESTINAL - ADULT  Goal: Minimal or absence of nausea and vomiting  Description: INTERVENTIONS:  - Maintain adequate hydration with IV or PO as ordered and tolerated  - Nasogastric tube to low intermittent suction as ordered  - Evaluate effectiveness of ordered antiemetic medications  - Provide nonpharmacologic comfort measures as appropriate  - Advance diet as tolerated, if ordered  - Obtain nutritional consult as needed  - Evaluate fluid balance  Outcome: Progressing     Problem: METABOLIC/FLUID AND ELECTROLYTES - ADULT  Goal: Electrolytes maintained within normal limits  Description: INTERVENTIONS:  - Monitor labs and rhythm and assess patient for signs and symptoms of electrolyte imbalances  - Administer electrolyte replacement as ordered  - Monitor response to electrolyte replacements, including rhythm and repeat lab results as appropriate  - Fluid restriction as ordered  - Instruct patient on fluid and nutrition restrictions as appropriate  Outcome: Progressing  Goal: Hemodynamic stability and optimal renal function maintained  Description: INTERVENTIONS:  - Monitor labs and assess for signs and symptoms of volume excess or deficit  -  Monitor intake, output and patient weight  - Monitor urine specific gravity, serum osmolarity and serum sodium as indicated or ordered  - Monitor response to interventions for patient's volume status, including labs, urine output, blood pressure (other measures as available)  - Encourage oral intake as appropriate  - Instruct patient on fluid and nutrition restrictions as appropriate  Outcome: Progressing     Problem: MUSCULOSKELETAL - ADULT  Goal: Return mobility to safest level of function  Description: INTERVENTIONS:  - Assess patient stability and activity tolerance for standing, transferring and ambulating w/ or w/o assistive devices  - Assist with transfers and ambulation using safe patient handling equipment as needed  - Ensure adequate protection for wounds/incisions during mobilization  - Obtain PT/OT consults as needed  - Advance activity as appropriate  - Communicate ordered activity level and limitations with patient/family  Outcome: Progressing   Fall precautions in place, call light within reach.

## 2024-05-16 NOTE — PLAN OF CARE
Rosaura is A&Ox4. RA. NG to low intermittent suction. Tolerating clear diet. Fall precautions in place, call light within reach.     Problem: Patient Centered Care  Goal: Patient preferences are identified and integrated in the patient's plan of care  Description: Interventions:  - What would you like us to know as we care for you? From home alone  - Provide timely, complete, and accurate information to patient/family  - Incorporate patient and family knowledge, values, beliefs, and cultural backgrounds into the planning and delivery of care  - Encourage patient/family to participate in care and decision-making at the level they choose  - Honor patient and family perspectives and choices  Outcome: Progressing        Problem: PAIN - ADULT  Goal: Verbalizes/displays adequate comfort level or patient's stated pain goal  Description: INTERVENTIONS:  - Encourage pt to monitor pain and request assistance  - Assess pain using appropriate pain scale  - Administer analgesics based on type and severity of pain and evaluate response  - Implement non-pharmacological measures as appropriate and evaluate response  - Consider cultural and social influences on pain and pain management  - Manage/alleviate anxiety  - Utilize distraction and/or relaxation techniques  - Monitor for opioid side effects  - Notify MD/LIP if interventions unsuccessful or patient reports new pain  - Anticipate increased pain with activity and pre-medicate as appropriate  Outcome: Progressing     Problem: SAFETY ADULT - FALL  Goal: Free from fall injury  Description: INTERVENTIONS:  - Assess pt frequently for physical needs  - Identify cognitive and physical deficits and behaviors that affect risk of falls.  - [place] fall precautions as indicated by assessment.  - Educate pt/family on patient safety including physical limitations  - Instruct pt to call for assistance with activity based on assessment  - Modify environment to reduce risk of injury  -  Provide assistive devices as appropriate  - Consider OT/PT consult to assist with strengthening/mobility  - Encourage toileting schedule  Outcome: Progressing     Problem: METABOLIC/FLUID AND ELECTROLYTES - ADULT  Goal: Electrolytes maintained within normal limits  Description: INTERVENTIONS:  - Monitor labs and rhythm and assess patient for signs and symptoms of electrolyte imbalances  - Administer electrolyte replacement as ordered  - Monitor response to electrolyte replacements, including rhythm and repeat lab results as appropriate  - Fluid restriction as ordered  - Instruct patient on fluid and nutrition restrictions as appropriate  Outcome: Progressing  Goal: Hemodynamic stability and optimal renal function maintained  Description: INTERVENTIONS:  - Monitor labs and assess for signs and symptoms of volume excess or deficit  - Monitor intake, output and patient weight  - Monitor urine specific gravity, serum osmolarity and serum sodium as indicated or ordered  - Monitor response to interventions for patient's volume status, including labs, urine output, blood pressure (other measures as available)  - Encourage oral intake as appropriate  - Instruct patient on fluid and nutrition restrictions as appropriate  Outcome: Progressing

## 2024-05-16 NOTE — PROGRESS NOTES
[facility] [place]  part of [facility]  Hospitalist Progress Note     Rosaura France Hermosillo Patient Status:  Inpatient    1933  89 year old CSN [id number]   Location 559/559-A Attending Epifanio Chapman MD   Hosp Day # 1 PCP Enid Segal MD     Assessment & Plan:   ----------------------------------  Small bowel obstruction.  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  Pain resolved.  Passing some flatus, no stool  -Surgical consult appreciated  -NG tube: In place  -CLD  -IV fluids 83 cc an hour  -Pain control  -Encourage activity as tolerated  -Further imaging per surgery    Dehydration.  Creatinine slightly above normal, now back to baseline.  -Continue IV fluids  -Repeat BMP in the morning    Other problems  Hypertension, restart home meds when NG tube out  Dyslipidemia  Osteoarthritis  CKD stage III  History of PE, restart Eliquis when NG tube out  PSVT, restart calcium channel blocker and beta-blocker when NG tube out  Obesity BMI 34  GERD  Hiatal hernia    dvt prophylaxis: sc heparin  code status: full code  dispo: home upon medical clearance    I personally reviewed the available laboratories, imaging including CT abdomen. I discussed/will discuss the case with general surgery. I ordered laboratories, studies including CBC, BMP. I adjusted medications including Protonix. Medical decision making high, risk is high.      Subjective:   ----------------------------------  Pain now resolved.  Passing some gas from below.  No stools.  Tolerating NG tube.      Objective:   Chief Complaint:   Chief Complaint   Patient presents with    Abdomen/Flank Pain     ----------------------------------  Temp:  [97.5 °F (36.4 °C)-98.1 °F (36.7 °C)] 97.5 °F (36.4 °C)  Pulse:  [55-70] 55  Resp:  [16-17] 16  BP: (138-151)/(46-75) 146/60  SpO2:  [93 %-99 %] 97 %  Gen: A+Ox3.  No distress.   HEENT: NCAT, neck supple, no carotid bruit.  CV: RRR, S1S2, and intact distal pulses. No gallop, rub,  murmur.  Pulm: Effort and breath sounds normal. No distress, wheezes, rales, rhonchi.  Abd: Soft, NTND, BS hypoactive, no mass, no HSM, no rebound/guarding.   Neuro: Normal reflexes, CN. Sensory/motor exams grossly normal deficit.   MS: No joint effusions.  No peripheral edema.  Skin: Skin is warm and dry. No rashes, erythema, diaphoresis.   Psych: Normal mood and affect. Calm, cooperative    Labs:  Lab Results   Component Value Date    HGB 13.4 05/16/2024    WBC 6.0 05/16/2024    .0 05/16/2024     05/16/2024    K 4.3 05/16/2024    CREATSERUM 1.33 (H) 05/16/2024    AST 24 05/15/2024    ALT 14 05/15/2024    TROP <0.045 11/22/2020             ondansetron    morphINE **OR** morphINE **OR** morphINE    acetaminophen    albuterol  **Certification      PHYSICIAN Certification of Need for Inpatient Hospitalization - Initial Certification    Patient will require inpatient services that will reasonably be expected to span two midnight's based on the clinical documentation in H+P.   Based on patients current state of illness, I anticipate that, after discharge, patient will require TBD.

## 2024-05-16 NOTE — CONSULTS
[facility]    PATIENT'S NAME: ZACHARY SWARTZ   ATTENDING PHYSICIAN: Odilia Foote MD   CONSULTING PHYSICIAN: Tevin Hathaway MD   PATIENT ACCOUNT#:   [id number]    LOCATION:  [address] [place] [address]  MEDICAL RECORD #:   [id number]       YOB: 1933  ADMISSION DATE:       05/15/2024      CONSULT DATE:  05/15/2024    REPORT OF CONSULTATION      REASON FOR CONSULTATION:  Small-bowel obstruction.    HISTORY OF PRESENT ILLNESS:  Patient is a 89-year-old female admitted with abdominal pain, reflux, and vomiting.  CT without contrast shows a small-bowel obstruction with a transition in the lower central abdomen.  She has had gynecologic surgery in the past.    PAST MEDICAL HISTORY:  Hypertension, hyperlipidemia, chronic kidney disease stage 3, PE on Eliquis, paroxysmal supraventricular tachycardia, longstanding ventral hernia, hiatal hernia.    PAST SURGICAL HISTORY:  Breast biopsy, rectocele and cystocele repair, cholecystectomy.      MEDICATIONS:  Please see reconciliation.      ALLERGIES:  None.    FAMILY HISTORY:  Coronary disease.    REVIEW OF SYSTEMS:  Significant for crampy abdominal pain associated with nausea, vomiting, and reflux-type symptoms.      PHYSICAL EXAMINATION:    GENERAL:  She is alert and oriented, appears uncomfortable, in no distress.    VITAL SIGNS:  Stable.  HEENT:  Atraumatic.  Dry mucosa.  EOMI, PERRLA.  NECK:  Supple without lymphadenopathy.  Trachea midline.  Full range of motion.  LUNGS:  Clear to auscultation.  No rhonchi or wheezing.  HEART:  Regular rate and rhythm.  No murmur, rubs, or gallops.  ABDOMEN:  Soft, obese.  Large supraumbilical ventral hernia, reducible.  EXTREMITIES:  Without clubbing, cyanosis, or edema.     IMPRESSION:  Small-bowel obstruction, probably from gynecologic surgery.    PLAN:  Conservative treatment.  NG tube decompression.  IV fluid resuscitation.  Further recommendations to follow clinical course.     I thank you for this  consultation.    Dictated By Tevin Hathaway MD  d: 05/16/2024 06:59:30  t: 05/16/2024 07:24:34  Job [id number]  GL/

## 2024-05-16 NOTE — PROGRESS NOTES
[facility]  part of [facility]    Progress Note    Rosaura Hermosillo Patient Status:  Inpatient    1933 MRN [id number]   Location [facility] 5SW/SE Attending Odilia Foote MD   Hosp Day # 1 PCP Enid Segal MD     Assessment and Plan:     Small bowel obstruction.  Feels comfortable.  Passing scant flatus.  -NG tube and start a liquid diet.  May require small bowel follow-through.    Subjective:   States she feels better    Objective:   Patient Vitals for the past 24 hrs:   BP Temp Temp src Pulse Resp SpO2 Height Weight   24 0340 138/46 97.6 °F (36.4 °C) Oral 58 16 93 % -- --   05/15/24 2122 138/60 98.1 °F (36.7 °C) Oral 57 16 94 % -- --   05/15/24 1403 -- -- -- -- -- -- -- 198 lb (89.8 kg)   05/15/24 1349 138/75 98 °F (36.7 °C) Oral 70 17 99 % -- --   05/15/24 1300 151/73 -- -- 66 16 99 % -- --   05/15/24 1050 123/66 -- -- 64 16 96 % -- --   05/15/24 0830 136/75 -- -- 62 16 98 % -- --   05/15/24 0800 137/63 -- -- 62 -- 100 % -- --   05/15/24 0732 145/70 97.7 °F (36.5 °C) Oral 64 16 100 % 5' 4\" (1.626 m) 198 lb (89.8 kg)       Intake/Output                   24 0700 - 05/15/24 0659 (Not Admitted) 05/15/24 07 - 24 0659 24 07 - 24 0659       Intake    P.O.  --  0  --    P.O. -- 0 --    I.V.  --  1355  --    Volume (mL) (potassium chloride 20 mEq in dextrose 5%-sodium chloride 0.45% 1000mL infusion premix) -- 1355 --    Total Intake -- 1355 --       Output    Urine  --  150  --    Urine -- 150 --    Incontinent Urine Occurrence -- 1 x --    Emesis/NG output  --  425  --    Output (mL) (NG/OG Tube Nasogastric 16 Fr. Right nostril) -- 425 --    Total Output -- 575 --       Net I/O     -- 780 --            Exam: Abdomen is soft, obese with a large ventral hernia reducible    Results:     Lab Results   Component Value Date    WBC 6.2 05/15/2024    HGB 14.5 05/15/2024    HCT 45.2 05/15/2024    .0 05/15/2024    CREATSERUM 1.79 (H) 05/15/2024     BUN 31 (H) 05/15/2024     05/15/2024    K 4.0 05/15/2024     05/15/2024    CO2 26.0 05/15/2024     (H) 05/15/2024    CA 9.9 05/15/2024    ALB 4.5 05/15/2024    ALKPHO 79 05/15/2024    BILT 0.5 05/15/2024    TP 8.1 05/15/2024    AST 24 05/15/2024    ALT 14 05/15/2024    PTT 31.6 10/16/2023    T4F 1.0 06/04/2022    TSH 2.880 06/04/2022    LIP 69 05/15/2024    DDIMER 1.53 (H) 08/07/2021    MG 2.3 10/16/2023    PHOS 2.8 12/08/2023    TROP <0.045 11/22/2020    B12 281 10/16/2023       XR CHEST AP PORTABLE  (CPT=71045)    Result Date: 5/15/2024  CONCLUSION:  1. Stable chest without radiographically evident acute intrathoracic process.  2. Nasogastric tube with tip projecting in the gastric body.    Dictated by (CST): Alec Bose MD on 5/15/2024 at 1:04 PM     Finalized by (CST): Alec Bose MD on 5/15/2024 at 1:06 PM          CT ABDOMEN+PELVIS(CPT=74176)    Result Date: 5/15/2024  CONCLUSION:   Small bowel obstruction with transition point at the lower central abdomen, etiology indeterminate.  The appendix is unremarkable.  Diverticulosis without evidence of acute diverticulitis.  Additional chronic or incidental findings are described in the body of this report.      Dictated by (CST): Hola Isbell MD on 5/15/2024 at 10:41 AM     Finalized by (CST): Hola Isbell MD on 5/15/2024 at 11:01 AM                   Tevin Hathaway MD  5/16/2024

## 2024-05-16 NOTE — CM/SW NOTE
CM self referred for dc planning.  Per chart review, patient had home health services arranged with [facility] during her last admission 12/2023.  CM confirmed with [facility] liaison Ernestine that patient was recently discharged from their services in 04/2024.    CM sent HH referrals via [employer] as a tentative dc plan. Orders and face to face entered. Pending PT/OT eval.     / to remain available for support and/or discharge planning.     Janae Velázquez RN, BSN  Nurse   [phone]

## 2024-05-16 NOTE — TELEPHONE ENCOUNTER
Patient admitted for small bowel obstruction. Home health inquiring if Dr. Segal will write home health orders?

## 2024-05-16 NOTE — TELEPHONE ENCOUNTER
Left specific voice mail message on Ernestine's voice mail [facility] at [phone] Dr. Segal will give orders for the patient when she  is discharged.

## 2024-05-16 NOTE — TELEPHONE ENCOUNTER
Ernestine from Home Health  needs verbal OK to sign home health orders.Patient currently in [facility].

## 2024-05-17 LAB
ANION GAP SERPL CALC-SCNC: 5 MMOL/L (ref 0–18)
BASOPHILS # BLD AUTO: 0.06 X10(3) UL (ref 0–0.2)
BASOPHILS NFR BLD AUTO: 1.3 %
BUN BLD-MCNC: 16 MG/DL (ref 9–23)
BUN/CREAT SERPL: 15.2 (ref 10–20)
CALCIUM BLD-MCNC: 8.7 MG/DL (ref 8.7–10.4)
CHLORIDE SERPL-SCNC: 112 MMOL/L (ref 98–112)
CO2 SERPL-SCNC: 27 MMOL/L (ref 21–32)
CREAT BLD-MCNC: 1.05 MG/DL
DEPRECATED RDW RBC AUTO: 42.5 FL (ref 35.1–46.3)
EGFRCR SERPLBLD CKD-EPI 2021: 50 ML/MIN/1.73M2 (ref 60–?)
EOSINOPHIL # BLD AUTO: 0.16 X10(3) UL (ref 0–0.7)
EOSINOPHIL NFR BLD AUTO: 3.5 %
ERYTHROCYTE [DISTWIDTH] IN BLOOD BY AUTOMATED COUNT: 12.9 % (ref 11–15)
GLUCOSE BLD-MCNC: 99 MG/DL (ref 70–99)
HCT VFR BLD AUTO: 40.9 %
HGB BLD-MCNC: 13.3 G/DL
IMM GRANULOCYTES # BLD AUTO: 0.01 X10(3) UL (ref 0–1)
IMM GRANULOCYTES NFR BLD: 0.2 %
LYMPHOCYTES # BLD AUTO: 1.81 X10(3) UL (ref 1–4)
LYMPHOCYTES NFR BLD AUTO: 39.6 %
MCH RBC QN AUTO: 29.4 PG (ref 26–34)
MCHC RBC AUTO-ENTMCNC: 32.5 G/DL (ref 31–37)
MCV RBC AUTO: 90.3 FL
MONOCYTES # BLD AUTO: 0.43 X10(3) UL (ref 0.1–1)
MONOCYTES NFR BLD AUTO: 9.4 %
NEUTROPHILS # BLD AUTO: 2.1 X10 (3) UL (ref 1.5–7.7)
NEUTROPHILS # BLD AUTO: 2.1 X10(3) UL (ref 1.5–7.7)
NEUTROPHILS NFR BLD AUTO: 46 %
OSMOLALITY SERPL CALC.SUM OF ELEC: 299 MOSM/KG (ref 275–295)
PLATELET # BLD AUTO: 158 10(3)UL (ref 150–450)
POTASSIUM SERPL-SCNC: 4.3 MMOL/L (ref 3.5–5.1)
RBC # BLD AUTO: 4.53 X10(6)UL
SODIUM SERPL-SCNC: 144 MMOL/L (ref 136–145)
WBC # BLD AUTO: 4.6 X10(3) UL (ref 4–11)

## 2024-05-17 PROCEDURE — 99233 SBSQ HOSP IP/OBS HIGH 50: CPT | Performed by: HOSPITALIST

## 2024-05-17 NOTE — PROGRESS NOTES
[facility]  part of [facility]  Hospitalist Progress Note     Rosaura France Hermosillo Patient Status:  Inpatient    1933  89 year old CSN [id number]   Location 559/559-A Attending Epifanio Chapman MD   Hosp Day # 2 PCP Enid Segal MD     Assessment & Plan:   ----------------------------------  Small bowel obstruction.  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  Pain resolved.  Passing some flatus, no stool.  Sat in a chair yesterday but did not try to go for a walk  -Surgical consult appreciated  -NG tube: In place  -CLD  -IV fluids 83 cc an hour  -Pain control  -Encourage activity as tolerated  -Further imaging per surgery    Dehydration.  Creatinine slightly above normal, now back to baseline.  -Continue IV fluids due to limited p.o. status  -Repeat BMP if clinical change    Other problems  Hypertension, restart home meds when NG tube out  Dyslipidemia  Osteoarthritis  CKD stage III  History of PE, restart Eliquis when NG tube out  PSVT, restart calcium channel blocker and beta-blocker when NG tube out  Obesity BMI 34  GERD  Hiatal hernia    dvt prophylaxis: sc heparin  code status: full code  dispo: To be decided    I personally reviewed the available laboratories, imaging including creatinine. I discussed/will discuss the case with general surgery. I ordered laboratories, studies including BMP. I adjusted medications including Protonix. Medical decision making high, risk is high.      Subjective:   ----------------------------------  Patient feeling tired.  No chest pain or shortness of breath.  No significant abdominal pain.  Passing gas.  No bowel movements.  Agreeable to trying to walk around more.      Objective:   Chief Complaint:   Chief Complaint   Patient presents with    Abdomen/Flank Pain     ----------------------------------  Temp:  [97.4 °F (36.3 °C)-97.6 °F (36.4 °C)] 97.6 °F (36.4 °C)  Pulse:  [53-73] 60  Resp:  [18] 18  BP: (153-172)/(60-78)  156/70  SpO2:  [97 %-100 %] 100 %  Gen: A+Ox3.  No distress.   HEENT: NCAT, neck supple, no carotid bruit.  CV: RRR, S1S2, and intact distal pulses. No gallop, rub, murmur.  Pulm: Effort and breath sounds normal. No distress, wheezes, rales, rhonchi.  Abd: Soft, NTND, BS hypoactive, no mass, no HSM, no rebound/guarding.   Neuro: Normal reflexes, CN. Sensory/motor exams grossly normal deficit.   MS: No joint effusions.  No peripheral edema.  Skin: Skin is warm and dry. No rashes, erythema, diaphoresis.   Psych: Normal mood and affect. Calm, cooperative    Labs:  Lab Results   Component Value Date    HGB 13.3 05/17/2024    WBC 4.6 05/17/2024    .0 05/17/2024     05/17/2024    K 4.3 05/17/2024    CREATSERUM 1.05 (H) 05/17/2024    AST 24 05/15/2024    ALT 14 05/15/2024    TROP <0.045 11/22/2020          heparin  5,000 Units Subcutaneous 2 times per day    pantoprazole  40 mg Intravenous Daily       ondansetron    morphINE **OR** morphINE **OR** morphINE    acetaminophen    albuterol  **Certification      PHYSICIAN Certification of Need for Inpatient Hospitalization - Initial Certification    Patient will require inpatient services that will reasonably be expected to span two midnight's based on the clinical documentation in H+P.   Based on patients current state of illness, I anticipate that, after discharge, patient will require TBD.

## 2024-05-17 NOTE — PLAN OF CARE
Problem: Patient Centered Care  Goal: Patient preferences are identified and integrated in the patient's plan of care  Description: Interventions:  - What would you like us to know as we care for you?   - Provide timely, complete, and accurate information to patient/family  - Incorporate patient and family knowledge, values, beliefs, and cultural backgrounds into the planning and delivery of care  - Encourage patient/family to participate in care and decision-making at the level they choose  - Honor patient and family perspectives and choices  Outcome: Progressing     Problem: PAIN - ADULT  Goal: Verbalizes/displays adequate comfort level or patient's stated pain goal  Description: INTERVENTIONS:  - Encourage pt to monitor pain and request assistance  - Assess pain using appropriate pain scale  - Administer analgesics based on type and severity of pain and evaluate response  - Implement non-pharmacological measures as appropriate and evaluate response  - Consider cultural and social influences on pain and pain management  - Manage/alleviate anxiety  - Utilize distraction and/or relaxation techniques  - Monitor for opioid side effects  - Notify MD/LIP if interventions unsuccessful or patient reports new pain  - Anticipate increased pain with activity and pre-medicate as appropriate  Outcome: Progressing     Problem: SAFETY ADULT - FALL  Goal: Free from fall injury  Description: INTERVENTIONS:  - Assess pt frequently for physical needs  - Identify cognitive and physical deficits and behaviors that affect risk of falls.  - [place] fall precautions as indicated by assessment.  - Educate pt/family on patient safety including physical limitations  - Instruct pt to call for assistance with activity based on assessment  - Modify environment to reduce risk of injury  - Provide assistive devices as appropriate  - Consider OT/PT consult to assist with strengthening/mobility  - Encourage toileting schedule  Outcome:  Progressing     Problem: GASTROINTESTINAL - ADULT  Goal: Minimal or absence of nausea and vomiting  Description: INTERVENTIONS:  - Maintain adequate hydration with IV or PO as ordered and tolerated  - Nasogastric tube to low intermittent suction as ordered  - Evaluate effectiveness of ordered antiemetic medications  - Provide nonpharmacologic comfort measures as appropriate  - Advance diet as tolerated, if ordered  - Obtain nutritional consult as needed  - Evaluate fluid balance  Outcome: Progressing     Problem: METABOLIC/FLUID AND ELECTROLYTES - ADULT  Goal: Electrolytes maintained within normal limits  Description: INTERVENTIONS:  - Monitor labs and rhythm and assess patient for signs and symptoms of electrolyte imbalances  - Administer electrolyte replacement as ordered  - Monitor response to electrolyte replacements, including rhythm and repeat lab results as appropriate  - Fluid restriction as ordered  - Instruct patient on fluid and nutrition restrictions as appropriate  Outcome: Progressing  Goal: Hemodynamic stability and optimal renal function maintained  Description: INTERVENTIONS:  - Monitor labs and assess for signs and symptoms of volume excess or deficit  - Monitor intake, output and patient weight  - Monitor urine specific gravity, serum osmolarity and serum sodium as indicated or ordered  - Monitor response to interventions for patient's volume status, including labs, urine output, blood pressure (other measures as available)  - Encourage oral intake as appropriate  - Instruct patient on fluid and nutrition restrictions as appropriate  Outcome: Progressing     Problem: MUSCULOSKELETAL - ADULT  Goal: Return mobility to safest level of function  Description: INTERVENTIONS:  - Assess patient stability and activity tolerance for standing, transferring and ambulating w/ or w/o assistive devices  - Assist with transfers and ambulation using safe patient handling equipment as needed  - Ensure adequate  protection for wounds/incisions during mobilization  - Obtain PT/OT consults as needed  - Advance activity as appropriate  - Communicate ordered activity level and limitations with patient/family  Outcome: Progressing   No acute changes, patient in no acute distress, NG tube to low intermittent suction, tolerating clear liquid diet well. Safety precautions in place

## 2024-05-17 NOTE — CM/SW NOTE
List of accepting HH agencies provided to patient at bedside. [facility] is chosen agency at [place]. Agency reserved in [facility], sherrie Sinha also notified. PT/OT evals are pending at this time.    Plan: Home w/[facility] pending evals and medical clearance.    Lamonte Arce, MICHELLEN    [phone]

## 2024-05-17 NOTE — PROGRESS NOTES
[facility]  part of [facility]    Progress Note    Rosaura Hermosillo Patient Status:  Inpatient    1933 MRN [id number]   Location [facility] 5SW/SE Attending Epifanio Chapman MD   Hosp Day # 2 PCP Enid Segal MD       Subjective:   Pt passing scant flatus, no bowel movements. No nausea or vomiting.    Objective:   Vital Signs:  Blood pressure 156/70, pulse 60, temperature 97.6 °F (36.4 °C), temperature source Oral, resp. rate 18, height 5' 4\" (1.626 m), weight 198 lb (89.8 kg), SpO2 100%.    Physical Exam     General: No acute distress. Alert and oriented x 3.  HEENT: Moist mucous membranes. Anicteric.   Neck: Supple, No JVD.   Respiratory: Nonlabored resp.  Cardiovascular: Regular rate.   Abdomen: Soft, NT, no rebound tnd, no guarding, nondistended. Chronic ventral hernia.  No masses. Normal bowel sounds.    Neurologic: No focal neurological deficits.  Musculoskeletal: Full range of motion of all extremities. No calf tenderness. No swelling noted.  Integument: No lesions. No erythema.  Psychiatric: Appropriate mood and affect.         Assessment and Plan:     SBO (small bowel obstruction) (HCC)      Small bowel obstruction (HCC)    Continue non-operative management, including NGT and clears for comfort. Ambulate as able.     Results:     Lab Results   Component Value Date    WBC 4.6 2024    HGB 13.3 2024    HCT 40.9 2024    .0 2024    CREATSERUM 1.05 (H) 2024    BUN 16 2024     2024    K 4.3 2024     2024    CO2 27.0 2024    GLU 99 2024    CA 8.7 2024    ALB 4.5 05/15/2024    ALKPHO 79 05/15/2024    BILT 0.5 05/15/2024    TP 8.1 05/15/2024    AST 24 05/15/2024    ALT 14 05/15/2024    PTT 31.6 10/16/2023    T4F 1.0 2022    TSH 2.880 2022    LIP 69 05/15/2024    DDIMER 1.53 (H) 2021    MG 2.3 10/16/2023    PHOS 2.8 2023    TROP <0.045 2020    B12 281  10/16/2023       XR CHEST AP PORTABLE  (CPT=71045)    Result Date: 5/15/2024  CONCLUSION:  1. Stable chest without radiographically evident acute intrathoracic process.  2. Nasogastric tube with tip projecting in the gastric body.    Dictated by (CST): Alec Bose MD on 5/15/2024 at 1:04 PM     Finalized by (CST): Alec Bose MD on 5/15/2024 at 1:06 PM                       Nicholas Ellis PA-C  5/17/2024

## 2024-05-17 NOTE — DISCHARGE INSTRUCTIONS
Sometimes managing your health at home requires assistance.  The [facility] team has recognized your preference to use [facility] [phone]. A representative from the home health agency will contact you or your family to schedule your first visit.

## 2024-05-17 NOTE — PLAN OF CARE
Rosaura is A&Ox4. RA. NG tube to low intermittent suction. Tolerating clear liquids. IV fluids. Call light within reach, fall precautions in place.   Problem: Patient Centered Care  Goal: Patient preferences are identified and integrated in the patient's plan of care  Description: Interventions:  - What would you like us to know as we care for you? From home with family.   - Provide timely, complete, and accurate information to patient/family  - Incorporate patient and family knowledge, values, beliefs, and cultural backgrounds into the planning and delivery of care  - Encourage patient/family to participate in care and decision-making at the level they choose  - Honor patient and family perspectives and choices  Outcome: Progressing     Problem: Patient/Family Goals  Goal: Patient/Family Long Term Goal  Description: Patient's Long Term Goal: Patient states now abdominal pain.     Interventions:  - Follow up with doctor, advance diet slowly, medications  - See additional Care Plan goals for specific interventions  Outcome: Progressing  Goal: Patient/Family Short Term Goal  Description: Patient's Short Term Goal: Patient able to tolerate regular diet    Interventions:   - NG tube taken out, diet advanced as tolerated.   - See additional Care Plan goals for specific interventions  Outcome: Progressing     Problem: PAIN - ADULT  Goal: Verbalizes/displays adequate comfort level or patient's stated pain goal  Description: INTERVENTIONS:  - Encourage pt to monitor pain and request assistance  - Assess pain using appropriate pain scale  - Administer analgesics based on type and severity of pain and evaluate response  - Implement non-pharmacological measures as appropriate and evaluate response  - Consider cultural and social influences on pain and pain management  - Manage/alleviate anxiety  - Utilize distraction and/or relaxation techniques  - Monitor for opioid side effects  - Notify MD/LIP if interventions unsuccessful  or patient reports new pain  - Anticipate increased pain with activity and pre-medicate as appropriate  Outcome: Progressing     Problem: SAFETY ADULT - FALL  Goal: Free from fall injury  Description: INTERVENTIONS:  - Assess pt frequently for physical needs  - Identify cognitive and physical deficits and behaviors that affect risk of falls.  - [place] fall precautions as indicated by assessment.  - Educate pt/family on patient safety including physical limitations  - Instruct pt to call for assistance with activity based on assessment  - Modify environment to reduce risk of injury  - Provide assistive devices as appropriate  - Consider OT/PT consult to assist with strengthening/mobility  - Encourage toileting schedule  Outcome: Progressing     Problem: GASTROINTESTINAL - ADULT  Goal: Minimal or absence of nausea and vomiting  Description: INTERVENTIONS:  - Maintain adequate hydration with IV or PO as ordered and tolerated  - Nasogastric tube to low intermittent suction as ordered  - Evaluate effectiveness of ordered antiemetic medications  - Provide nonpharmacologic comfort measures as appropriate  - Advance diet as tolerated, if ordered  - Obtain nutritional consult as needed  - Evaluate fluid balance  Outcome: Progressing     Problem: METABOLIC/FLUID AND ELECTROLYTES - ADULT  Goal: Electrolytes maintained within normal limits  Description: INTERVENTIONS:  - Monitor labs and rhythm and assess patient for signs and symptoms of electrolyte imbalances  - Administer electrolyte replacement as ordered  - Monitor response to electrolyte replacements, including rhythm and repeat lab results as appropriate  - Fluid restriction as ordered  - Instruct patient on fluid and nutrition restrictions as appropriate  Outcome: Progressing  Goal: Hemodynamic stability and optimal renal function maintained  Description: INTERVENTIONS:  - Monitor labs and assess for signs and symptoms of volume excess or deficit  - Monitor intake,  output and patient weight  - Monitor urine specific gravity, serum osmolarity and serum sodium as indicated or ordered  - Monitor response to interventions for patient's volume status, including labs, urine output, blood pressure (other measures as available)  - Encourage oral intake as appropriate  - Instruct patient on fluid and nutrition restrictions as appropriate  Outcome: Progressing     Problem: MUSCULOSKELETAL - ADULT  Goal: Return mobility to safest level of function  Description: INTERVENTIONS:  - Assess patient stability and activity tolerance for standing, transferring and ambulating w/ or w/o assistive devices  - Assist with transfers and ambulation using safe patient handling equipment as needed  - Ensure adequate protection for wounds/incisions during mobilization  - Obtain PT/OT consults as needed  - Advance activity as appropriate  - Communicate ordered activity level and limitations with patient/family  Outcome: Progressing

## 2024-05-17 NOTE — HOME CARE LIAISON
Referral received from SW/CM team via [facility]. Discussed with patient the recommendation for home health services, patient agreeable, and all questions answered. Updated CHERISE Aburto.

## 2024-05-18 PROCEDURE — 99232 SBSQ HOSP IP/OBS MODERATE 35: CPT | Performed by: SURGERY

## 2024-05-18 PROCEDURE — 99233 SBSQ HOSP IP/OBS HIGH 50: CPT | Performed by: HOSPITALIST

## 2024-05-18 NOTE — PLAN OF CARE
Problem: Patient Centered Care  Goal: Patient preferences are identified and integrated in the patient's plan of care  Description: Interventions:  - What would you like us to know as we care for you?   - Provide timely, complete, and accurate information to patient/family  - Incorporate patient and family knowledge, values, beliefs, and cultural backgrounds into the planning and delivery of care  - Encourage patient/family to participate in care and decision-making at the level they choose  - Honor patient and family perspectives and choices  Outcome: Progressing     Problem: PAIN - ADULT  Goal: Verbalizes/displays adequate comfort level or patient's stated pain goal  Description: INTERVENTIONS:  - Encourage pt to monitor pain and request assistance  - Assess pain using appropriate pain scale  - Administer analgesics based on type and severity of pain and evaluate response  - Implement non-pharmacological measures as appropriate and evaluate response  - Consider cultural and social influences on pain and pain management  - Manage/alleviate anxiety  - Utilize distraction and/or relaxation techniques  - Monitor for opioid side effects  - Notify MD/LIP if interventions unsuccessful or patient reports new pain  - Anticipate increased pain with activity and pre-medicate as appropriate  Outcome: Progressing     Problem: SAFETY ADULT - FALL  Goal: Free from fall injury  Description: INTERVENTIONS:  - Assess pt frequently for physical needs  - Identify cognitive and physical deficits and behaviors that affect risk of falls.  - [place] fall precautions as indicated by assessment.  - Educate pt/family on patient safety including physical limitations  - Instruct pt to call for assistance with activity based on assessment  - Modify environment to reduce risk of injury  - Provide assistive devices as appropriate  - Consider OT/PT consult to assist with strengthening/mobility  - Encourage toileting schedule  Outcome:  Progressing     Problem: GASTROINTESTINAL - ADULT  Goal: Minimal or absence of nausea and vomiting  Description: INTERVENTIONS:  - Maintain adequate hydration with IV or PO as ordered and tolerated  - Nasogastric tube to low intermittent suction as ordered  - Evaluate effectiveness of ordered antiemetic medications  - Provide nonpharmacologic comfort measures as appropriate  - Advance diet as tolerated, if ordered  - Obtain nutritional consult as needed  - Evaluate fluid balance  Outcome: Progressing     Problem: METABOLIC/FLUID AND ELECTROLYTES - ADULT  Goal: Electrolytes maintained within normal limits  Description: INTERVENTIONS:  - Monitor labs and rhythm and assess patient for signs and symptoms of electrolyte imbalances  - Administer electrolyte replacement as ordered  - Monitor response to electrolyte replacements, including rhythm and repeat lab results as appropriate  - Fluid restriction as ordered  - Instruct patient on fluid and nutrition restrictions as appropriate  Outcome: Progressing  Goal: Hemodynamic stability and optimal renal function maintained  Description: INTERVENTIONS:  - Monitor labs and assess for signs and symptoms of volume excess or deficit  - Monitor intake, output and patient weight  - Monitor urine specific gravity, serum osmolarity and serum sodium as indicated or ordered  - Monitor response to interventions for patient's volume status, including labs, urine output, blood pressure (other measures as available)  - Encourage oral intake as appropriate  - Instruct patient on fluid and nutrition restrictions as appropriate  Outcome: Progressing     Problem: MUSCULOSKELETAL - ADULT  Goal: Return mobility to safest level of function  Description: INTERVENTIONS:  - Assess patient stability and activity tolerance for standing, transferring and ambulating w/ or w/o assistive devices  - Assist with transfers and ambulation using safe patient handling equipment as needed  - Ensure adequate  protection for wounds/incisions during mobilization  - Obtain PT/OT consults as needed  - Advance activity as appropriate  - Communicate ordered activity level and limitations with patient/family  Outcome: Progressing   No acute changes, NG tube to low intermittent suction with small output, patient denies pain, had large formed bowel movement. Safety precautions in place

## 2024-05-18 NOTE — PROGRESS NOTES
[facility]  part of formerly [facility]  Hospitalist Progress Note     Rosaura France [place] Patient Status:  Inpatient    1933  89 year old CSN [id number]   Location 559/559-A Attending Epifanio Chapman MD   Hosp Day # 3 PCP Enid Segal MD     Assessment & Plan:   ----------------------------------  Small bowel obstruction.  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  Pain resolved.  Passing some flatus, no stool.  Sat in a chair yesterday but did not try to go for a walk  -Surgical consult appreciated  -NG tube: In place, clap and check residual today  -CLD  -IV fluids 83 cc an hour  -Pain control  -Encourage activity as tolerated  -Further imaging per surgery    Dehydration.  Creatinine slightly above normal, now back to baseline.  -Continue IV fluids due to limited p.o. status  -Repeat BMP if clinical change    Other problems  Hypertension, restart home meds when NG tube out  Dyslipidemia  Osteoarthritis  CKD stage III  History of PE, restart Eliquis when NG tube out  PSVT, restart calcium channel blocker and beta-blocker when NG tube out  Obesity BMI 34  GERD  Hiatal hernia    dvt prophylaxis: sc heparin  code status: full code  dispo: To be decided    I personally reviewed the available laboratories, imaging including creatinine. I discussed/will discuss the case with general surgery. I ordered laboratories, studies including BMP. I adjusted medications including Protonix. Medical decision making high, risk is high.      Subjective:   ----------------------------------  Large BM overnight. No abd pain. Walking a little      Objective:   Chief Complaint:   Chief Complaint   Patient presents with    Abdomen/Flank Pain     ----------------------------------  Temp:  [97.1 °F (36.2 °C)-97.8 °F (36.6 °C)] 97.6 °F (36.4 °C)  Pulse:  [63-79] 74  Resp:  [16-18] 18  BP: (140-175)/(60-90) 175/90  SpO2:  [96 %-99 %] 97 %  Gen: A+Ox3.  No distress.   HEENT: NCAT, neck supple, no  carotid bruit.  CV: RRR, S1S2, and intact distal pulses. No gallop, rub, murmur.  Pulm: Effort and breath sounds normal. No distress, wheezes, rales, rhonchi.  Abd: Soft, NTND, BS hypoactive, no mass, no HSM, no rebound/guarding.   Neuro: Normal reflexes, CN. Sensory/motor exams grossly normal deficit.   MS: No joint effusions.  No peripheral edema.  Skin: Skin is warm and dry. No rashes, erythema, diaphoresis.   Psych: Normal mood and affect. Calm, cooperative    Labs:  Lab Results   Component Value Date    HGB 13.3 05/17/2024    WBC 4.6 05/17/2024    .0 05/17/2024     05/17/2024    K 4.3 05/17/2024    CREATSERUM 1.05 (H) 05/17/2024    AST 24 05/15/2024    ALT 14 05/15/2024    TROP <0.045 11/22/2020          heparin  5,000 Units Subcutaneous 2 times per day    pantoprazole  40 mg Intravenous Daily       ondansetron    morphINE **OR** morphINE **OR** morphINE    acetaminophen    albuterol  **Certification      PHYSICIAN Certification of Need for Inpatient Hospitalization - Initial Certification    Patient will require inpatient services that will reasonably be expected to span two midnight's based on the clinical documentation in H+P.   Based on patients current state of illness, I anticipate that, after discharge, patient will require TBD.

## 2024-05-18 NOTE — PLAN OF CARE
Problem: Patient Centered Care  Goal: Patient preferences are identified and integrated in the patient's plan of care  Description: Interventions:  - What would you like us to know as we care for you?   - Provide timely, complete, and accurate information to patient/family  - Incorporate patient and family knowledge, values, beliefs, and cultural backgrounds into the planning and delivery of care  - Encourage patient/family to participate in care and decision-making at the level they choose  - Honor patient and family perspectives and choices  Outcome: Progressing     Problem: Patient/Family Goals  Goal: Patient/Family Long Term Goal  Description: Patient's Long Term Goal:     Interventions:  -   - See additional Care Plan goals for specific interventions  Outcome: Progressing  Goal: Patient/Family Short Term Goal  Description: Patient's Short Term Goal:     Interventions:   -   - See additional Care Plan goals for specific interventions  Outcome: Progressing     Problem: PAIN - ADULT  Goal: Verbalizes/displays adequate comfort level or patient's stated pain goal  Description: INTERVENTIONS:  - Encourage pt to monitor pain and request assistance  - Assess pain using appropriate pain scale  - Administer analgesics based on type and severity of pain and evaluate response  - Implement non-pharmacological measures as appropriate and evaluate response  - Consider cultural and social influences on pain and pain management  - Manage/alleviate anxiety  - Utilize distraction and/or relaxation techniques  - Monitor for opioid side effects  - Notify MD/LIP if interventions unsuccessful or patient reports new pain  - Anticipate increased pain with activity and pre-medicate as appropriate  Outcome: Progressing     Problem: SAFETY ADULT - FALL  Goal: Free from fall injury  Description: INTERVENTIONS:  - Assess pt frequently for physical needs  - Identify cognitive and physical deficits and behaviors that affect risk of  falls.  - [place] fall precautions as indicated by assessment.  - Educate pt/family on patient safety including physical limitations  - Instruct pt to call for assistance with activity based on assessment  - Modify environment to reduce risk of injury  - Provide assistive devices as appropriate  - Consider OT/PT consult to assist with strengthening/mobility  - Encourage toileting schedule  Outcome: Progressing     Problem: GASTROINTESTINAL - ADULT  Goal: Minimal or absence of nausea and vomiting  Description: INTERVENTIONS:  - Maintain adequate hydration with IV or PO as ordered and tolerated  - Nasogastric tube to low intermittent suction as ordered  - Evaluate effectiveness of ordered antiemetic medications  - Provide nonpharmacologic comfort measures as appropriate  - Advance diet as tolerated, if ordered  - Obtain nutritional consult as needed  - Evaluate fluid balance  Outcome: Progressing     Problem: METABOLIC/FLUID AND ELECTROLYTES - ADULT  Goal: Electrolytes maintained within normal limits  Description: INTERVENTIONS:  - Monitor labs and rhythm and assess patient for signs and symptoms of electrolyte imbalances  - Administer electrolyte replacement as ordered  - Monitor response to electrolyte replacements, including rhythm and repeat lab results as appropriate  - Fluid restriction as ordered  - Instruct patient on fluid and nutrition restrictions as appropriate  Outcome: Progressing  Goal: Hemodynamic stability and optimal renal function maintained  Description: INTERVENTIONS:  - Monitor labs and assess for signs and symptoms of volume excess or deficit  - Monitor intake, output and patient weight  - Monitor urine specific gravity, serum osmolarity and serum sodium as indicated or ordered  - Monitor response to interventions for patient's volume status, including labs, urine output, blood pressure (other measures as available)  - Encourage oral intake as appropriate  - Instruct patient on fluid and  nutrition restrictions as appropriate  Outcome: Progressing     Problem: MUSCULOSKELETAL - ADULT  Goal: Return mobility to safest level of function  Description: INTERVENTIONS:  - Assess patient stability and activity tolerance for standing, transferring and ambulating w/ or w/o assistive devices  - Assist with transfers and ambulation using safe patient handling equipment as needed  - Ensure adequate protection for wounds/incisions during mobilization  - Obtain PT/OT consults as needed  - Advance activity as appropriate  - Communicate ordered activity level and limitations with patient/family  Outcome: Progressing

## 2024-05-18 NOTE — OCCUPATIONAL THERAPY NOTE
OCCUPATIONAL THERAPY EVALUATION - INPATIENT     Room Number: 559/559-A  Evaluation Date: 5/18/2024  Type of Evaluation: Initial  Presenting Problem: SBO, dehydration with mild acute chronic kidney injury  Hx HTN, CKD3, OA     Physician Order: IP Consult to Occupational Therapy  Reason for Therapy: ADL/IADL Dysfunction and Discharge Planning    OCCUPATIONAL THERAPY ASSESSMENT   Patient is a 89 year old female admitted 5/15/2024 for SBO, dehydration with mild acute chronic kidney injury.  Prior to admission, patient's baseline is independent with ADLs, laundry, and meal prep. MI for fx mobility using RW. Receives assist with grocery shopping and driving. Denied recent hx of falls. Patient is currently functioning near baseline with ADLs and fx mobility/transfers.  Patient is requiring up to min assist as a result of the following impairments: decreased functional strength, decreased functional reach, decreased endurance, impaired balance, and decreased muscular endurance. Occupational Therapy will continue to follow for duration of hospitalization.    Patient will benefit from continued skilled OT Services at discharge to promote functional independence and safety with additional support and return home with home health OT    PLAN  OT Treatment Plan: Balance activities;ADL training;Energy conservation/work simplification techniques;Functional transfer training;Endurance training;Patient/Family education;Patient/Family training;Equipment eval/education;Compensatory technique education     OCCUPATIONAL THERAPY MEDICAL/SOCIAL HISTORY   Problem List  Principal Problem:    SBO (small bowel obstruction) (HCC)  Active Problems:    Small bowel obstruction (HCC)    HOME SITUATION  Type of Home: House  Home Layout: One level  Lives With: Alone (supportive two daughters near)  Toilet and Equipment: Comfort height toilet  Shower/Tub and Equipment: Tub-shower combo; Grab bar  Other Equipment: -- (life alert, purewick at  night)  Drives: No  Patient Regularly Uses: Glasses  Stairs in Home: 1 ROSA  Use of Assistive Device(s): RW    SUBJECTIVE  \"I'm a little weaker because I haven't been walking.\"    OCCUPATIONAL THERAPY EXAMINATION    OBJECTIVE  Precautions: Bed/chair alarm; NG suction  Fall Risk: Standard fall risk    PAIN ASSESSMENT  [place]atin    ACTIVITY TOLERANCE           BP: (!) 175/90 (post-ax)  BP Location: Right arm  BP Method: Automatic  Patient Position: Sitting    COGNITION  Overall Cognitive Status:  WFL - within functional limits    SENSATION  Light touch:  intact  Except chronic B LE n/t    RANGE OF MOTION   Upper extremity ROM is within functional limits     STRENGTH ASSESSMENT  Upper extremity strength is within functional limits     COORDINATION  Gross Motor: WFL   Fine Motor: WFL     ACTIVITIES OF DAILY LIVING ASSESSMENT  AM-PAC ‘6-Clicks’ Inpatient Daily Activity Short Form  How much help from another person does the patient currently need…  -   Putting on and taking off regular lower body clothing?: A Little  -   Bathing (including washing, rinsing, drying)?: A Little  -   Toileting, which includes using toilet, bedpan or urinal? : A Little  -   Putting on and taking off regular upper body clothing?: A Little  -   Taking care of personal grooming such as brushing teeth?: A Little  -   Eating meals?: None (NPO - NG)    AM-PAC Score:  Score: 19  Approx Degree of Impairment: 42.8%  Standardized Score (AM-PAC Scale): 40.22  CMS Modifier (G-Code): CK    BED MOBILITY  Supine to Sit: SUP     FUNCTIONAL TRANSFER ASSESSMENT  Sit to Stand from EOB: CGA  Chair Transfer: CGA    FUNCTIONAL MOBILITY  CGA progressing to SBA for hallway fx mobility using RW    FUNCTIONAL ADL ASSESSMENT  Eating: unable to assess - NG to suction  Grooming: stand-by assist standing at sink  UB Dressing: setup assist (per obs)   LB Dressing: min assist  Toileting: supervision (per obs)     EDUCATION PROVIDED  Patient: Role of Occupational Therapy;  Plan of Care; Discharge Recommendations; Functional Transfer Techniques; Fall Prevention; Posture/Positioning; Energy Conservation; Proper Body Mechanics  Patient's Response to Education: Verbalized Understanding; Returned Demonstration    The patient's Approx Degree of Impairment: 42.8% has been calculated based on documentation in the [facility] '6 clicks' Inpatient Daily Activity Short Form.  Research supports that patients with this level of impairment may benefit from HH OT.  Final disposition will be made by interdisciplinary medical team.     Patient End of Session: Up in chair;Needs met;Call light within reach;RN aware of session/findings;All patient questions and concerns addressed;Alarm set    OT Goals  Patients self stated goal is: return to PLOF     Patient will complete functional transfer with MI  Comment:     Patient will complete toileting with MI  Comment:     Patient will complete LB dressing with MI  Comment:    Patient will complete item retrieval with MI  Comment:          Goals  on: 24  Frequency: 3-5x/week    Patient Evaluation Complexity Level:   Occupational Profile/Medical History MODERATE - Expanded review of history including review of medical or therapy record   Specific performance deficits impacting engagement in ADL/IADL MODERATE  3 - 5 performance deficits   Client Assessment/Performance Deficits MODERATE - Comorbidities and min to mod modifications of tasks    Clinical Decision Making MODERATE - Analysis of occupational profile, detailed assessments, several treatment options    Overall Complexity MODERATE     OT Session Time  Self-Care Home Management: 15 minutes  Therapeutic Activity: 8 minutes    MARIJA Baxter/L  [facility]  #[id number]

## 2024-05-18 NOTE — CM/SW NOTE
Anticipated therapy need: Home with Home Healthcare. [facility] has already been reserved in [facility].    Plan: Home w/[facility] pending medical clearance.    MICHELLE AlcantaraN    [phone]

## 2024-05-18 NOTE — PROGRESS NOTES
[facility]  part of [facility]    Progress Note    Rosaura Hermosillo Patient Status:  Inpatient    1933 MRN [id number]   Location [facility] 5SW/SE Attending Epifanio Chapman MD   Hosp Day # 3 PCP Enid Segal MD       Subjective:   Pt passing flatus, passed bowel movements. No nausea or vomiting.    Objective:   Vital Signs:  Blood pressure 158/64, pulse 63, temperature 97.6 °F (36.4 °C), temperature source Oral, resp. rate 18, height 5' 4\" (1.626 m), weight 198 lb (89.8 kg), SpO2 97%.    Physical Exam     General: No acute distress. Alert and oriented x 3.  HEENT: Moist mucous membranes. Anicteric.   Neck: Supple, No JVD.   Respiratory: Nonlabored resp.  Cardiovascular: Regular rate.   Abdomen: Soft, NT, no rebound tnd, no guarding, nondistended. Chronic ventral hernia.  No masses. Normal bowel sounds.    Neurologic: No focal neurological deficits.  Musculoskeletal: Full range of motion of all extremities. No calf tenderness. No swelling noted.  Integument: No lesions. No erythema.  Psychiatric: Appropriate mood and affect.         Assessment and Plan:     SBO (small bowel obstruction) (HCC)      Small bowel obstruction (HCC)    Continue non-operative management,  NGT test.  Ambulate as able.   D/W pt and Dr. Chapman.    Results:     Lab Results   Component Value Date    WBC 4.6 2024    HGB 13.3 2024    HCT 40.9 2024    .0 2024    CREATSERUM 1.05 (H) 2024    BUN 16 2024     2024    K 4.3 2024     2024    CO2 27.0 2024    GLU 99 2024    CA 8.7 2024    ALB 4.5 05/15/2024    ALKPHO 79 05/15/2024    BILT 0.5 05/15/2024    TP 8.1 05/15/2024    AST 24 05/15/2024    ALT 14 05/15/2024    PTT 31.6 10/16/2023    T4F 1.0 2022    TSH 2.880 2022    LIP 69 05/15/2024    DDIMER 1.53 (H) 2021    MG 2.3 10/16/2023    PHOS 2.8 2023    TROP <0.045 2020    B12 281 10/16/2023        No results found.

## 2024-05-19 LAB
BILIRUB UR QL: NEGATIVE
COLOR UR: YELLOW
GLUCOSE UR-MCNC: NORMAL MG/DL
HGB UR QL STRIP.AUTO: NEGATIVE
KETONES UR-MCNC: NEGATIVE MG/DL
LEUKOCYTE ESTERASE UR QL STRIP.AUTO: 500
NITRITE UR QL STRIP.AUTO: NEGATIVE
PH UR: 5.5 [PH] (ref 5–8)
SP GR UR STRIP: 1.02 (ref 1–1.03)
UROBILINOGEN UR STRIP-ACNC: NORMAL

## 2024-05-19 PROCEDURE — 99233 SBSQ HOSP IP/OBS HIGH 50: CPT | Performed by: HOSPITALIST

## 2024-05-19 PROCEDURE — 99232 SBSQ HOSP IP/OBS MODERATE 35: CPT | Performed by: SURGERY

## 2024-05-19 RX ORDER — DILTIAZEM HYDROCHLORIDE 240 MG/1
240 CAPSULE, COATED, EXTENDED RELEASE ORAL DAILY
Status: DISCONTINUED | OUTPATIENT
Start: 2024-05-19 | End: 2024-05-23

## 2024-05-19 NOTE — PROGRESS NOTES
[facility]  part of [facility]    Progress Note    Rosaura Hermosillo Patient Status:  Inpatient    1933 MRN [id number]   Location [facility] 5SW/SE Attending Epifanio Chapman MD   Hosp Day # 4 PCP Enid Segal MD       Subjective:   Pt passing flatus. Abdullahi po intake. No nausea or vomiting.    Objective:   Vital Signs:  Blood pressure 137/65, pulse 80, temperature 97.3 °F (36.3 °C), temperature source Oral, resp. rate 18, height 5' 4\" (1.626 m), weight 204 lb (92.5 kg), SpO2 98%.    Physical Exam     General: No acute distress. Alert and oriented x 3.  HEENT: Moist mucous membranes. Anicteric.   Neck: Supple, No JVD.   Respiratory: Nonlabored resp.  Cardiovascular: Regular rate.   Abdomen: Soft, NT, no rebound tnd, no guarding, nondistended. Chronic ventral hernia.  No masses. Normal bowel sounds.    Neurologic: No focal neurological deficits.  Musculoskeletal: Full range of motion of all extremities. No calf tenderness. No swelling noted.  Integument: No lesions. No erythema.  Psychiatric: Appropriate mood and affect.         Assessment and Plan:     SBO (small bowel obstruction) (HCC)      Small bowel obstruction (HCC)    Continue non-operative management, improving. Ambulate.   D/W pt and Dr. Chapman.    Results:     Lab Results   Component Value Date    WBC 4.6 2024    HGB 13.3 2024    HCT 40.9 2024    .0 2024    CREATSERUM 1.05 (H) 2024    BUN 16 2024     2024    K 4.3 2024     2024    CO2 27.0 2024    GLU 99 2024    CA 8.7 2024    ALB 4.5 05/15/2024    ALKPHO 79 05/15/2024    BILT 0.5 05/15/2024    TP 8.1 05/15/2024    AST 24 05/15/2024    ALT 14 05/15/2024    PTT 31.6 10/16/2023    T4F 1.0 2022    TSH 2.880 2022    LIP 69 05/15/2024    DDIMER 1.53 (H) 2021    MG 2.3 10/16/2023    PHOS 2.8 2023    TROP <0.045 2020    B12 281 10/16/2023       No results  found.

## 2024-05-19 NOTE — PLAN OF CARE
A/O x4, no complaints of pain and no acute changes at this time. IV fluids continued. No complaints of nausea or vomiting. Call light within reach, bed alarm on, non-slip socks on, frequent rounding done.    Problem: Patient Centered Care  Goal: Patient preferences are identified and integrated in the patient's plan of care  Description: Interventions:  - What would you like us to know as we care for you? From home alone  - Provide timely, complete, and accurate information to patient/family  - Incorporate patient and family knowledge, values, beliefs, and cultural backgrounds into the planning and delivery of care  - Encourage patient/family to participate in care and decision-making at the level they choose  - Honor patient and family perspectives and choices  Outcome: Progressing     Problem: Patient/Family Goals  Goal: Patient/Family Long Term Goal  Description: Patient's Long Term Goal: To return home    Interventions:  - Monitor vital signs and labs  - Monitor patient for abdominal pain, nausea, vomiting  - General surgery consult  - IV fluids per MD order  - See additional Care Plan goals for specific interventions  Outcome: Progressing  Goal: Patient/Family Short Term Goal  Description: Patient's Short Term Goal: To feel better    Interventions:   - Monitor vital signs and labs  - Monitor patient for abdominal pain, nausea, vomiting  - General surgery consult  - IV fluids per MD order  - See additional Care Plan goals for specific interventions  Outcome: Progressing     Problem: PAIN - ADULT  Goal: Verbalizes/displays adequate comfort level or patient's stated pain goal  Description: INTERVENTIONS:  - Encourage pt to monitor pain and request assistance  - Assess pain using appropriate pain scale  - Administer analgesics based on type and severity of pain and evaluate response  - Implement non-pharmacological measures as appropriate and evaluate response  - Consider cultural and social influences on pain and  pain management  - Manage/alleviate anxiety  - Utilize distraction and/or relaxation techniques  - Monitor for opioid side effects  - Notify MD/LIP if interventions unsuccessful or patient reports new pain  - Anticipate increased pain with activity and pre-medicate as appropriate  Outcome: Progressing     Problem: SAFETY ADULT - FALL  Goal: Free from fall injury  Description: INTERVENTIONS:  - Assess pt frequently for physical needs  - Identify cognitive and physical deficits and behaviors that affect risk of falls.  - [place] fall precautions as indicated by assessment.  - Educate pt/family on patient safety including physical limitations  - Instruct pt to call for assistance with activity based on assessment  - Modify environment to reduce risk of injury  - Provide assistive devices as appropriate  - Consider OT/PT consult to assist with strengthening/mobility  - Encourage toileting schedule  Outcome: Progressing     Problem: GASTROINTESTINAL - ADULT  Goal: Minimal or absence of nausea and vomiting  Description: INTERVENTIONS:  - Maintain adequate hydration with IV or PO as ordered and tolerated  - Nasogastric tube to low intermittent suction as ordered  - Evaluate effectiveness of ordered antiemetic medications  - Provide nonpharmacologic comfort measures as appropriate  - Advance diet as tolerated, if ordered  - Obtain nutritional consult as needed  - Evaluate fluid balance  Outcome: Progressing     Problem: METABOLIC/FLUID AND ELECTROLYTES - ADULT  Goal: Electrolytes maintained within normal limits  Description: INTERVENTIONS:  - Monitor labs and rhythm and assess patient for signs and symptoms of electrolyte imbalances  - Administer electrolyte replacement as ordered  - Monitor response to electrolyte replacements, including rhythm and repeat lab results as appropriate  - Fluid restriction as ordered  - Instruct patient on fluid and nutrition restrictions as appropriate  Outcome: Progressing  Goal:  Hemodynamic stability and optimal renal function maintained  Description: INTERVENTIONS:  - Monitor labs and assess for signs and symptoms of volume excess or deficit  - Monitor intake, output and patient weight  - Monitor urine specific gravity, serum osmolarity and serum sodium as indicated or ordered  - Monitor response to interventions for patient's volume status, including labs, urine output, blood pressure (other measures as available)  - Encourage oral intake as appropriate  - Instruct patient on fluid and nutrition restrictions as appropriate  Outcome: Progressing     Problem: MUSCULOSKELETAL - ADULT  Goal: Return mobility to safest level of function  Description: INTERVENTIONS:  - Assess patient stability and activity tolerance for standing, transferring and ambulating w/ or w/o assistive devices  - Assist with transfers and ambulation using safe patient handling equipment as needed  - Ensure adequate protection for wounds/incisions during mobilization  - Obtain PT/OT consults as needed  - Advance activity as appropriate  - Communicate ordered activity level and limitations with patient/family  Outcome: Progressing

## 2024-05-19 NOTE — PLAN OF CARE
Problem: Patient Centered Care  Goal: Patient preferences are identified and integrated in the patient's plan of care  Description: Interventions  - What would you like us to know as we care for you?   - Provide timely, complete, and accurate information to patient/family  - Incorporate patient and family knowledge, values, beliefs, and cultural backgrounds into the planning and delivery of care  - Encourage patient/family to participate in care and decision-making at the level they choose  - Honor patient and family perspectives and choices  Outcome: Progressing     Problem: Patient/Family Goals  Goal: Patient/Family Long Term Goal  Description: Patient's Long Term Goal:     Interventions:  -   - See additional Care Plan goals for specific interventions  Outcome: Progressing  Goal: Patient/Family Short Term Goal  Description: Patient's Short Term Goal:     Interventions:   -   - See additional Care Plan goals for specific interventions  Outcome: Progressing     Problem: PAIN - ADULT  Goal: Verbalizes/displays adequate comfort level or patient's stated pain goal  Description: INTERVENTIONS:  - Encourage pt to monitor pain and request assistance  - Assess pain using appropriate pain scale  - Administer analgesics based on type and severity of pain and evaluate response  - Implement non-pharmacological measures as appropriate and evaluate response  - Consider cultural and social influences on pain and pain management  - Manage/alleviate anxiety  - Utilize distraction and/or relaxation techniques  - Monitor for opioid side effects  - Notify MD/LIP if interventions unsuccessful or patient reports new pain  - Anticipate increased pain with activity and pre-medicate as appropriate  Outcome: Progressing     Problem: SAFETY ADULT - FALL  Goal: Free from fall injury  Description: INTERVENTIONS:  - Assess pt frequently for physical needs  - Identify cognitive and physical deficits and behaviors that affect risk of falls.  -  [place] fall precautions as indicated by assessment.  - Educate pt/family on patient safety including physical limitations  - Instruct pt to call for assistance with activity based on assessment  - Modify environment to reduce risk of injury  - Provide assistive devices as appropriate  - Consider OT/PT consult to assist with strengthening/mobility  - Encourage toileting schedule  Outcome: Progressing     Problem: GASTROINTESTINAL - ADULT  Goal: Minimal or absence of nausea and vomiting  Description: INTERVENTIONS:  - Maintain adequate hydration with IV or PO as ordered and tolerated  - Nasogastric tube to low intermittent suction as ordered  - Evaluate effectiveness of ordered antiemetic medications  - Provide nonpharmacologic comfort measures as appropriate  - Advance diet as tolerated, if ordered  - Obtain nutritional consult as needed  - Evaluate fluid balance  Outcome: Progressing     Problem: METABOLIC/FLUID AND ELECTROLYTES - ADULT  Goal: Electrolytes maintained within normal limits  Description: INTERVENTIONS:  - Monitor labs and rhythm and assess patient for signs and symptoms of electrolyte imbalances  - Administer electrolyte replacement as ordered  - Monitor response to electrolyte replacements, including rhythm and repeat lab results as appropriate  - Fluid restriction as ordered  - Instruct patient on fluid and nutrition restrictions as appropriate  Outcome: Progressing  Goal: Hemodynamic stability and optimal renal function maintained  Description: INTERVENTIONS:  - Monitor labs and assess for signs and symptoms of volume excess or deficit  - Monitor intake, output and patient weight  - Monitor urine specific gravity, serum osmolarity and serum sodium as indicated or ordered  - Monitor response to interventions for patient's volume status, including labs, urine output, blood pressure (other measures as available)  - Encourage oral intake as appropriate  - Instruct patient on fluid and nutrition  restrictions as appropriate  Outcome: Progressing     Problem: MUSCULOSKELETAL - ADULT  Goal: Return mobility to safest level of function  Description: INTERVENTIONS:  - Assess patient stability and activity tolerance for standing, transferring and ambulating w/ or w/o assistive devices  - Assist with transfers and ambulation using safe patient handling equipment as needed  - Ensure adequate protection for wounds/incisions during mobilization  - Obtain PT/OT consults as needed  - Advance activity as appropriate  - Communicate ordered activity level and limitations with patient/family  Outcome: Progressing

## 2024-05-20 PROBLEM — K62.5 RECTAL BLEED: Status: ACTIVE | Noted: 2024-05-20

## 2024-05-20 LAB
ANION GAP SERPL CALC-SCNC: 7 MMOL/L (ref 0–18)
BASOPHILS # BLD AUTO: 0.05 X10(3) UL (ref 0–0.2)
BASOPHILS NFR BLD AUTO: 1.3 %
BUN BLD-MCNC: 10 MG/DL (ref 9–23)
BUN/CREAT SERPL: 9.3 (ref 10–20)
CALCIUM BLD-MCNC: 9 MG/DL (ref 8.7–10.4)
CHLORIDE SERPL-SCNC: 113 MMOL/L (ref 98–112)
CO2 SERPL-SCNC: 24 MMOL/L (ref 21–32)
CREAT BLD-MCNC: 1.07 MG/DL
DEPRECATED RDW RBC AUTO: 42.2 FL (ref 35.1–46.3)
EGFRCR SERPLBLD CKD-EPI 2021: 49 ML/MIN/1.73M2 (ref 60–?)
EOSINOPHIL # BLD AUTO: 0.16 X10(3) UL (ref 0–0.7)
EOSINOPHIL NFR BLD AUTO: 4.2 %
ERYTHROCYTE [DISTWIDTH] IN BLOOD BY AUTOMATED COUNT: 12.9 % (ref 11–15)
GLUCOSE BLD-MCNC: 90 MG/DL (ref 70–99)
HCT VFR BLD AUTO: 41.8 %
HGB BLD-MCNC: 14 G/DL
IMM GRANULOCYTES # BLD AUTO: 0.01 X10(3) UL (ref 0–1)
IMM GRANULOCYTES NFR BLD: 0.3 %
LYMPHOCYTES # BLD AUTO: 1.8 X10(3) UL (ref 1–4)
LYMPHOCYTES NFR BLD AUTO: 47.7 %
MCH RBC QN AUTO: 29.8 PG (ref 26–34)
MCHC RBC AUTO-ENTMCNC: 33.5 G/DL (ref 31–37)
MCV RBC AUTO: 88.9 FL
MONOCYTES # BLD AUTO: 0.3 X10(3) UL (ref 0.1–1)
MONOCYTES NFR BLD AUTO: 8 %
NEUTROPHILS # BLD AUTO: 1.45 X10 (3) UL (ref 1.5–7.7)
NEUTROPHILS # BLD AUTO: 1.45 X10(3) UL (ref 1.5–7.7)
NEUTROPHILS NFR BLD AUTO: 38.5 %
OSMOLALITY SERPL CALC.SUM OF ELEC: 297 MOSM/KG (ref 275–295)
PLATELET # BLD AUTO: 157 10(3)UL (ref 150–450)
POTASSIUM SERPL-SCNC: 4.1 MMOL/L (ref 3.5–5.1)
RBC # BLD AUTO: 4.7 X10(6)UL
SODIUM SERPL-SCNC: 144 MMOL/L (ref 136–145)
WBC # BLD AUTO: 3.8 X10(3) UL (ref 4–11)

## 2024-05-20 PROCEDURE — 99233 SBSQ HOSP IP/OBS HIGH 50: CPT | Performed by: HOSPITALIST

## 2024-05-20 NOTE — OCCUPATIONAL THERAPY NOTE
OCCUPATIONAL THERAPY TREATMENT NOTE - INPATIENT        Room Number: 559/559-A     Presenting Problem: SBO, dehydration with mild acute chronic kidney injury    Problem List  Principal Problem:    SBO (small bowel obstruction) (HCC)  Active Problems:    Small bowel obstruction (HCC)    Rectal bleed      OCCUPATIONAL THERAPY ASSESSMENT   Patient demonstrates good  progress this session, goals remain in progress.    Patient continues to function below baseline with  self care/ADLs .   Contributing factors to remaining limitations include decreased functional strength and decreased endurance.  Next session anticipate patient to progress lower body dressing, transfers, and dynamic standing balance.  Occupational Therapy will continue to follow patient for duration of hospitalization.    Patient continues to benefit from continued skilled OT services: at discharge to promote functional independence in home.  Anticipate patient will return home with home health OT.     PLAN  OT Treatment Plan: Balance activities;Energy conservation/work simplification techniques;ADL training;Functional transfer training;Endurance training;Patient/Family education;Compensatory technique education  OT Device Recommendations: Shower chair    SUBJECTIVE  \"I am bleeding again. I want to walk though\"    OBJECTIVE  Precautions: Bed/chair alarm    WEIGHT BEARING RESTRICTION     PAIN ASSESSMENT  Rating: Unable to rate  Location: right lower abdomen intermittent pain  Management Techniques: Relaxation    ACTIVITY TOLERANCE  Pulse: 55  Heart Rate Source: Monitor     BP: [id number]/[id number]  BP Location: Right arm  BP Method: Automatic  Patient Position: Sitting    O2 SATURATIONS  Oxygen Therapy  SPO2% on Room Air at Rest: 99    ACTIVITIES OF DAILY LIVING ASSESSMENT  AM-PAC ‘6-Clicks’ Inpatient Daily Activity Short Form  How much help from another person does the patient currently need…  -   Putting on and taking off regular lower body clothing?: A Little  -    Bathing (including washing, rinsing, drying)?: A Little  -   Toileting, which includes using toilet, bedpan or urinal? : A Little  -   Putting on and taking off regular upper body clothing?: A Little  -   Taking care of personal grooming such as brushing teeth?: A Little  -   Eating meals?: None    AM-PAC Score:  Score: 19  Approx Degree of Impairment: 42.8%  Standardized Score (AM-PAC Scale): 40.22  CMS Modifier (G-Code): CK    FUNCTIONAL TRANSFER ASSESSMENT  Sit to Stand: Chair  Chair: Contact Guard Assist  Toilet Transfer: Contact Guard Assist    BED MOBILITY     BALANCE ASSESSMENT  Static Sitting: Supervision  Sitting Unilateral: Stand-by Assist  Static Standing: Stand-by Assist  Standing Unilateral: Contact Guard Assist    FUNCTIONAL ADL ASSESSMENT  Eating: Independent  Grooming Seated: Modified Independent (Pt was unable to tolerate standing ADLs due to fatigue and c/o weakness. She completed teeth brushing and washed her face seated)  Bathing Seated: Moderate Assist  LB Dressing Seated: Maximum Assist  Toileting Seated: Stand-by Assist    THERAPEUTIC EXERCISE     Skilled Therapy Provided: RN approved session. Pt received in the chair, agreeable to tx. Pt reports \"unrated\" intermittent pain in right lower abdomen. Pt reports feeling weaker today. She completed sit to stand transfer with CGA from chair height. Pt ambulated to the bathroom with CGA and RW. She transferred to the toilet with CGA. Pt completed pericare seated with supervision and required CGA to manage her brief in standing. She washed her hands at the sink with CGA but was not able to tolerate further ADLs in standing due to fatigue and weakness. Pt ambulated back to the chair. She was set up to brush her teeth and wash her face in sitting. Pt left with all needs in reach. All vitas stable during session. Pt is making good progress and continues to be motivated. She was limited by fatigue and c/o general weakness this session.     EDUCATION  PROVIDED  Patient: Role of Occupational Therapy; Plan of Care; Discharge Recommendations; Functional Transfer Techniques; Compensatory ADL Techniques  Patient's Response to Education: Verbalized Understanding    The patient's Approx Degree of Impairment: 42.8% has been calculated based on documentation in the [facility] '6 clicks' Inpatient Daily Activity Short Form.  Research supports that patients with this level of impairment may benefit from home with HH.  Final disposition will be made by interdisciplinary medical team.    Patient End of Session: Up in chair;Needs met;Call light within reach;RN aware of session/findings;All patient questions and concerns addressed;Alarm set    OT Goals:     Patient will complete functional transfer with MI  Comment: ongoing    Patient will complete toileting with MI  Comment: ongoing    Patient will complete LB dressing with MI  Comment:ongoing    Patient will complete item retrieval with MI  Comment:ongoing          Goals  on: 24  Frequency: 3-5x/week    OT Session   Self-Care Home Management: 10 minutes  Therapeutic Activity: 15 minutes

## 2024-05-20 NOTE — PLAN OF CARE
Problem: Patient Centered Care  Goal: Patient preferences are identified and integrated in the patient's plan of care  Description: Interventions:  - What would you like us to know as we care for you?   - Provide timely, complete, and accurate information to patient/family  - Incorporate patient and family knowledge, values, beliefs, and cultural backgrounds into the planning and delivery of care  - Encourage patient/family to participate in care and decision-making at the level they choose  - Honor patient and family perspectives and choices  Outcome: Progressing     Problem: Patient/Family Goals  Goal: Patient/Family Long Term Goal  Description: Patient's Long Term Goal:     Interventions:  -   - See additional Care Plan goals for specific interventions  Outcome: Progressing  Goal: Patient/Family Short Term Goal  Description: Patient's Short Term Goal:     Interventions:   -   - See additional Care Plan goals for specific interventions  Outcome: Progressing

## 2024-05-20 NOTE — PLAN OF CARE
Problem: Patient/Family Goals  Goal: Patient/Family Long Term Goal  Description: Patient's Long Term Goal:     Interventions:    - See additional Care Plan goals for specific interventions  Outcome: Progressing  Goal: Patient/Family Short Term Goal  Description: Patient's Short Term Goal:     Interventions:   Problem: PAIN - ADULT  Goal: Verbalizes/displays adequate comfort level or patient's stated pain goal  Description: INTERVENTIONS:  - Encourage pt to monitor pain and request assistance  - Assess pain using appropriate pain scale  - Administer analgesics based on type and severity of pain and evaluate response  - Implement non-pharmacological measures as appropriate and evaluate response  - Consider cultural and social influences on pain and pain management  - Manage/alleviate anxiety  - Utilize distraction and/or relaxation techniques  - Monitor for opioid side effects  - Notify MD/LIP if interventions unsuccessful or patient reports new pain  - Anticipate increased pain with activity and pre-medicate as appropriate  Outcome: Completed     Problem: SAFETY ADULT - FALL  Goal: Free from fall injury  Description: INTERVENTIONS:  - Assess pt frequently for physical needs  - Identify cognitive and physical deficits and behaviors that affect risk of falls.  - [place] fall precautions as indicated by assessment.  - Educate pt/family on patient safety including physical limitations  - Instruct pt to call for assistance with activity based on assessment  - Modify environment to reduce risk of injury  - Provide assistive devices as appropriate  - Consider OT/PT consult to assist with strengthening/mobility  - Encourage toileting schedule  Outcome: Completed     Problem: GASTROINTESTINAL - ADULT  Goal: Minimal or absence of nausea and vomiting  Description: INTERVENTIONS:  - Maintain adequate hydration with IV or PO as ordered and tolerated  - Nasogastric tube to low intermittent suction as ordered  - Evaluate  effectiveness of ordered antiemetic medications  - Provide nonpharmacologic comfort measures as appropriate  - Advance diet as tolerated, if ordered  - Obtain nutritional consult as needed  - Evaluate fluid balance  Outcome: Completed     Problem: METABOLIC/FLUID AND ELECTROLYTES - ADULT  Goal: Electrolytes maintained within normal limits  Description: INTERVENTIONS:  - Monitor labs and rhythm and assess patient for signs and symptoms of electrolyte imbalances  - Administer electrolyte replacement as ordered  - Monitor response to electrolyte replacements, including rhythm and repeat lab results as appropriate  - Fluid restriction as ordered  - Instruct patient on fluid and nutrition restrictions as appropriate  Outcome: Completed  Goal: Hemodynamic stability and optimal renal function maintained  Description: INTERVENTIONS:  - Monitor labs and assess for signs and symptoms of volume excess or deficit  - Monitor intake, output and patient weight  - Monitor urine specific gravity, serum osmolarity and serum sodium as indicated or ordered  - Monitor response to interventions for patient's volume status, including labs, urine output, blood pressure (other measures as available)  - Encourage oral intake as appropriate  - Instruct patient on fluid and nutrition restrictions as appropriate  Outcome: Completed     Problem: MUSCULOSKELETAL - ADULT  Goal: Return mobility to safest level of function  Description: INTERVENTIONS:  - Assess patient stability and activity tolerance for standing, transferring and ambulating w/ or w/o assistive devices  - Assist with transfers and ambulation using safe patient handling equipment as needed  - Ensure adequate protection for wounds/incisions during mobilization  - Obtain PT/OT consults as needed  - Advance activity as appropriate  - Communicate ordered activity level and limitations with patient/family  Outcome: Completed     - See additional Care Plan goals for specific  interventions  Outcome: Progressing

## 2024-05-20 NOTE — PROGRESS NOTES
[facility]  part of [facility]  Hospitalist Progress Note     Rosaura France Hermosillo Patient Status:  Inpatient    1933  89 year old CSN [id number]   Location 559/559-A Attending Epifanio Chapman MD   Hosp Day # 5 PCP Enid Segal MD     Assessment & Plan:   ----------------------------------  Small bowel obstruction.  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  Pain resolved.  Passing some flatus, no stool.  Sat in a chair yesterday but did not try to go for a walk  -Surgical consult appreciated  -NG tube: Out   -Full liquid diet  -IV fluids decreased to 50 cc an hour  -Pain control  -Encourage activity as tolerated  -Further imaging per surgery    Rectal bleed. Suspect hemorrhoids though can't r/o other more serious causes. Worsened by eliquis. Hgb and BP ok  -GI consult  -Hold eliquis for now    Dehydration.  Creatinine slightly above normal, now back to baseline.  -Continue IV fluids due to limited p.o. status  -Repeat BMP if clinical change    Recent UTI.  Finished course of antibiotics. UA 5/15 bland. Reordered per pt request  -Repeat UA, culture    Other problems  Hypertension, restart diltiazem, continue to hold metoprolol  Dyslipidemia  Osteoarthritis  CKD stage III  History of PE, restart Eliquis  PSVT, restart diltiazem  Obesity BMI 34  GERD  Hiatal hernia    dvt prophylaxis: sc heparin  code status: full code  dispo: To be decided, possibly home tomorrow if continues to do well    I personally reviewed the available laboratories, imaging including creatinine. I discussed/will discuss the case with general surgery. I ordered laboratories, studies including BMP. I adjusted medications including Protonix. Medical decision making high, risk is high.      Subjective:   ----------------------------------  Had some rectal bleeding overnight with BM. No pain.       Objective:   Chief Complaint:   Chief Complaint   Patient presents with    Abdomen/Flank Pain      ----------------------------------  Temp:  [97.1 °F (36.2 °C)-97.7 °F (36.5 °C)] 97.1 °F (36.2 °C)  Pulse:  [50-66] 52  Resp:  [17-18] 18  BP: (129-154)/(46-67) 137/59  SpO2:  [96 %-99 %] 98 %  Gen: A+Ox3.  No distress.   HEENT: NCAT, neck supple, no carotid bruit.  CV: RRR, S1S2, and intact distal pulses. No gallop, rub, murmur.  Pulm: Effort and breath sounds normal. No distress, wheezes, rales, rhonchi.  Abd: Soft, NTND, BS hypoactive, no mass, no HSM, no rebound/guarding.  Rectal exam shows min red blood, large ext hemorrhoid at 3 oclock.  Neuro: Normal reflexes, CN. Sensory/motor exams grossly normal deficit.   MS: No joint effusions.  No peripheral edema.  Skin: Skin is warm and dry. No rashes, erythema, diaphoresis.   Psych: Normal mood and affect. Calm, cooperative    Labs:  Lab Results   Component Value Date    HGB 14.0 05/20/2024    WBC 3.8 (L) 05/20/2024    .0 05/20/2024     05/20/2024    K 4.1 05/20/2024    CREATSERUM 1.07 (H) 05/20/2024    AST 24 05/15/2024    ALT 14 05/15/2024    TROP <0.045 11/22/2020          apixaban  2.5 mg Oral BID    dilTIAZem ER  240 mg Oral Daily    pantoprazole  40 mg Intravenous Daily       ondansetron    morphINE **OR** morphINE **OR** morphINE    acetaminophen    albuterol  **Certification      PHYSICIAN Certification of Need for Inpatient Hospitalization - Initial Certification    Patient will require inpatient services that will reasonably be expected to span two midnight's based on the clinical documentation in H+P.   Based on patients current state of illness, I anticipate that, after discharge, patient will require TBD.

## 2024-05-21 PROBLEM — K62.5 RECTAL BLEEDING: Status: ACTIVE | Noted: 2024-05-21

## 2024-05-21 LAB
ANION GAP SERPL CALC-SCNC: 5 MMOL/L (ref 0–18)
BASOPHILS # BLD AUTO: 0.03 X10(3) UL (ref 0–0.2)
BASOPHILS NFR BLD AUTO: 0.8 %
BUN BLD-MCNC: 10 MG/DL (ref 9–23)
BUN/CREAT SERPL: 9.3 (ref 10–20)
CALCIUM BLD-MCNC: 9.3 MG/DL (ref 8.7–10.4)
CHLORIDE SERPL-SCNC: 112 MMOL/L (ref 98–112)
CO2 SERPL-SCNC: 24 MMOL/L (ref 21–32)
CREAT BLD-MCNC: 1.07 MG/DL
DEPRECATED RDW RBC AUTO: 43.5 FL (ref 35.1–46.3)
EGFRCR SERPLBLD CKD-EPI 2021: 49 ML/MIN/1.73M2 (ref 60–?)
EOSINOPHIL # BLD AUTO: 0.16 X10(3) UL (ref 0–0.7)
EOSINOPHIL NFR BLD AUTO: 4 %
ERYTHROCYTE [DISTWIDTH] IN BLOOD BY AUTOMATED COUNT: 13.2 % (ref 11–15)
GLUCOSE BLD-MCNC: 96 MG/DL (ref 70–99)
HCT VFR BLD AUTO: 38.9 %
HGB BLD-MCNC: 12.6 G/DL
IMM GRANULOCYTES # BLD AUTO: 0 X10(3) UL (ref 0–1)
IMM GRANULOCYTES NFR BLD: 0 %
LYMPHOCYTES # BLD AUTO: 1.83 X10(3) UL (ref 1–4)
LYMPHOCYTES NFR BLD AUTO: 45.9 %
MCH RBC QN AUTO: 28.9 PG (ref 26–34)
MCHC RBC AUTO-ENTMCNC: 32.4 G/DL (ref 31–37)
MCV RBC AUTO: 89.2 FL
MONOCYTES # BLD AUTO: 0.32 X10(3) UL (ref 0.1–1)
MONOCYTES NFR BLD AUTO: 8 %
NEUTROPHILS # BLD AUTO: 1.65 X10 (3) UL (ref 1.5–7.7)
NEUTROPHILS # BLD AUTO: 1.65 X10(3) UL (ref 1.5–7.7)
NEUTROPHILS NFR BLD AUTO: 41.3 %
OSMOLALITY SERPL CALC.SUM OF ELEC: 291 MOSM/KG (ref 275–295)
PLATELET # BLD AUTO: 179 10(3)UL (ref 150–450)
POTASSIUM SERPL-SCNC: 4 MMOL/L (ref 3.5–5.1)
RBC # BLD AUTO: 4.36 X10(6)UL
SODIUM SERPL-SCNC: 141 MMOL/L (ref 136–145)
WBC # BLD AUTO: 4 X10(3) UL (ref 4–11)

## 2024-05-21 PROCEDURE — 99233 SBSQ HOSP IP/OBS HIGH 50: CPT | Performed by: HOSPITALIST

## 2024-05-21 PROCEDURE — 99223 1ST HOSP IP/OBS HIGH 75: CPT | Performed by: INTERNAL MEDICINE

## 2024-05-21 PROCEDURE — 99233 SBSQ HOSP IP/OBS HIGH 50: CPT | Performed by: SURGERY

## 2024-05-21 RX ORDER — PANTOPRAZOLE SODIUM 40 MG/1
40 TABLET, DELAYED RELEASE ORAL
Status: DISCONTINUED | OUTPATIENT
Start: 2024-05-22 | End: 2024-05-23

## 2024-05-21 RX ORDER — HYDROCODONE BITARTRATE AND ACETAMINOPHEN 5; 325 MG/1; MG/1
1 TABLET ORAL EVERY 4 HOURS PRN
Status: DISCONTINUED | OUTPATIENT
Start: 2024-05-21 | End: 2024-05-23

## 2024-05-21 NOTE — PLAN OF CARE
A/O x4, no complaints of pain overnight. IV fluids continued. No BM overnight, no blood per rectum when up to bathroom overnight. Call light within reach, bed alarm on, non-slip socks on, frequent rounding done.    Problem: Patient Centered Care  Goal: Patient preferences are identified and integrated in the patient's plan of care  Description: Interventions:  - What would you like us to know as we care for you? Home alone  - Provide timely, complete, and accurate information to patient/family  - Incorporate patient and family knowledge, values, beliefs, and cultural backgrounds into the planning and delivery of care  - Encourage patient/family to participate in care and decision-making at the level they choose  - Honor patient and family perspectives and choices  Outcome: Progressing     Problem: Patient/Family Goals  Goal: Patient/Family Long Term Goal  Description: Patient's Long Term Goal: To return home    Interventions:  - GI consult  - General surgery consult  - Monitor for signs/symptoms of bleeding  - See additional Care Plan goals for specific interventions  Outcome: Progressing  Goal: Patient/Family Short Term Goal  Description: Patient's Short Term Goal: To go home    Interventions:   - GI consult  - General surgery consult  - Monitor for signs/symptoms of bleeding  - See additional Care Plan goals for specific interventions  Outcome: Progressing

## 2024-05-21 NOTE — PROGRESS NOTES
[facility]  part of [facility]    Progress Note    Rosaura Hermosillo Patient Status:  Inpatient    1933 MRN [id number]   Location [facility] 5SW/SE Attending Epifanio Chapman MD   Hosp Day # 6 PCP Enid Segal MD     Assessment and Plan:     No further bleeding.  Tolerating her diet.  Advance to soft diet as tolerated.    Subjective:   No bleeding or bowel movement overnight    Objective:   Patient Vitals for the past 24 hrs:   BP Temp Temp src Pulse Resp SpO2 Weight   24 0544 125/50 97.8 °F (36.6 °C) Oral 52 18 97 % 202 lb 12.8 oz (92 kg)   24 124/53 97.7 °F (36.5 °C) Oral 51 18 98 % --   24 137/59 97.1 °F (36.2 °C) Oral 52 18 98 % --   24 0919 136/58 -- -- 55 -- -- --       Intake/Output                   24 0700 - 24 0659 24 0700 - 24 0659 24 0700 - 24 0659       Intake    P.O.  480  --  --    P.O. 480 -- --    I.V.  --  504.2  --    Volume (mL) (potassium chloride 20 mEq in dextrose 5%-sodium chloride 0.45% 1000mL infusion premix) -- 504.2 --    Total Intake 480 504.2 --       Output    Urine  850  700  --    Urine -- 700 --    Urine Occurrence -- 4 x --    Incontinent Urine Occurrence -- 1 x --    Output (mL) (External Urinary Catheter) 850 -- --    Stool  --  --  --    Stool Count Calculated for I/O 2 x -- --    Total Output 850 700 --       Net I/O     -370 -195.8 --            Exam: Abdomen soft nontender nondistended    Results:     Lab Results   Component Value Date    WBC 3.8 (L) 2024    HGB 14.0 2024    HCT 41.8 2024    .0 2024    CREATSERUM 1.07 (H) 2024    BUN 10 2024     2024    K 4.1 2024     (H) 2024    CO2 24.0 2024    GLU 90 2024    CA 9.0 2024    ALB 4.5 05/15/2024    ALKPHO 79 05/15/2024    BILT 0.5 05/15/2024    TP 8.1 05/15/2024    AST 24 05/15/2024    ALT 14 05/15/2024    PTT 31.6 10/16/2023     T4F 1.0 06/04/2022    TSH 2.880 06/04/2022    LIP 69 05/15/2024    DDIMER 1.53 (H) 08/07/2021    MG 2.3 10/16/2023    PHOS 2.8 12/08/2023    TROP <0.045 11/22/2020    B12 281 10/16/2023       No results found.            Tevin Hathaway MD  5/21/2024

## 2024-05-21 NOTE — PROGRESS NOTES
[facility]  part of [facility]  Hospitalist Progress Note     Rosaura France Hermosillo Patient Status:  Inpatient    1933  89 year old CSN [id number]   Location 559/559-A Attending Epifanio Chapman MD   Hosp Day # 6 PCP Enid Segal MD     Assessment & Plan:   ----------------------------------  Small bowel obstruction.  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  Pain resolved.  Passing some flatus, no stool.  Sat in a chair yesterday but did not try to go for a walk  -Surgical consult appreciated  -NG tube: Out   -soft diet  -dc IVF  -Pain control  -Encourage activity as tolerated    Rectal bleed. Suspect hemorrhoids though can't r/o other more serious causes. Now resolved. No plans for endoscopy  -GI consult appreciated  -cont eliquis    Dehydration.  resolved    Recent UTI.  Repeat cxs neg x2    Other problems  Hypertension, restart diltiazem, continue to hold metoprolol  Dyslipidemia  Osteoarthritis  CKD stage III  History of PE, Eliquis  PSVT, restart diltiazem  Obesity BMI 34  GERD  Hiatal hernia    dvt prophylaxis: eliquis  code status: full code  dispo: Home today if tolerates diet    I personally reviewed the available laboratories, imaging including creatinine. I discussed/will discuss the case with GI. I ordered laboratories, studies including BMP. I adjusted medications including Protonix. Medical decision making high, risk is high.      Subjective:   ----------------------------------  Abdullahi FLD, no further rectal bleeding.       Objective:   Chief Complaint:   Chief Complaint   Patient presents with    Abdomen/Flank Pain     ----------------------------------  Temp:  [97.1 °F (36.2 °C)-98 °F (36.7 °C)] 98 °F (36.7 °C)  Pulse:  [51-62] 62  Resp:  [18-20] 20  BP: (124-137)/(50-59) 130/56  SpO2:  [97 %-98 %] 97 %  Gen: A+Ox3.  No distress.   HEENT: NCAT, neck supple, no carotid bruit.  CV: RRR, S1S2, and intact distal pulses. No gallop, rub, murmur.  Pulm:  Effort and breath sounds normal. No distress, wheezes, rales, rhonchi.  Abd: Soft, NTND, BS hypoactive, no mass, no HSM, no rebound/guarding.  Rectal exam shows min red blood, large ext hemorrhoid at 3 oclock.  Neuro: Normal reflexes, CN. Sensory/motor exams grossly normal deficit.   MS: No joint effusions.  No peripheral edema.  Skin: Skin is warm and dry. No rashes, erythema, diaphoresis.   Psych: Normal mood and affect. Calm, cooperative    Labs:  Lab Results   Component Value Date    HGB 12.6 05/21/2024    WBC 4.0 05/21/2024    .0 05/21/2024     05/21/2024    K 4.0 05/21/2024    CREATSERUM 1.07 (H) 05/21/2024    AST 24 05/15/2024    ALT 14 05/15/2024    TROP <0.045 11/22/2020          apixaban  2.5 mg Oral BID    dilTIAZem ER  240 mg Oral Daily    pantoprazole  40 mg Intravenous Daily       ondansetron    morphINE **OR** morphINE **OR** morphINE    acetaminophen    albuterol  **Certification      PHYSICIAN Certification of Need for Inpatient Hospitalization - Initial Certification    Patient will require inpatient services that will reasonably be expected to span two midnight's based on the clinical documentation in H+P.   Based on patients current state of illness, I anticipate that, after discharge, patient will require TBD.

## 2024-05-21 NOTE — PLAN OF CARE
Problem: Patient Centered Care  Goal: Patient preferences are identified and integrated in the patient's plan of care  Description: Interventions:  - Provide timely, complete, and accurate information to patient/family  - Incorporate patient and family knowledge, values, beliefs, and cultural backgrounds into the planning and delivery of care  - Encourage patient/family to participate in care and decision-making at the level they choose  - Honor patient and family perspectives and choices  Outcome: Progressing     Problem: PAIN - ADULT  Goal: Verbalizes/displays adequate comfort level or patient's stated pain goal  Description: INTERVENTIONS:  - Encourage pt to monitor pain and request assistance  - Assess pain using appropriate pain scale  - Administer analgesics based on type and severity of pain and evaluate response  - Implement non-pharmacological measures as appropriate and evaluate response  - Consider cultural and social influences on pain and pain management  - Manage/alleviate anxiety  - Utilize distraction and/or relaxation techniques  - Monitor for opioid side effects  - Notify MD/LIP if interventions unsuccessful or patient reports new pain  - Anticipate increased pain with activity and pre-medicate as appropriate  Outcome: Progressing     Problem: HEMATOLOGIC - ADULT  Goal: Maintains hematologic stability  Description: INTERVENTIONS  - Assess for signs and symptoms of bleeding or hemorrhage  - Monitor labs and vital signs for trends  - Administer supportive blood products/factors, fluids and medications as ordered and appropriate  - Administer supportive blood products/factors as ordered and appropriate  Outcome: Progressing

## 2024-05-21 NOTE — PHYSICAL THERAPY NOTE
PHYSICAL THERAPY TREATMENT NOTE - INPATIENT     Room Number: 559/559-A       Presenting Problem: SBO with dehydration and weakness.  Sx team on consult--NG tube on wall suction in place .    PMH sign for prior ABD sx , HTN , OA , CKD , PE , PSVT  Co-Morbidities : Hx HTN, CKD3, OA    Problem List  Principal Problem:    SBO (small bowel obstruction) (HCC)  Active Problems:    Small bowel obstruction (HCC)    Rectal bleed      PHYSICAL THERAPY ASSESSMENT   Patient demonstrates good  progress this session, goals  progressing with 1/5 met this session.    Patient continues to function near baseline with bed mobility, transfers, gait, stair negotiation, standing prolonged periods, and performing household tasks.  Contributing factors to remaining limitations include decreased endurance/aerobic capacity, decreased muscular endurance, and medical status.  Next session anticipate patient to progress bed mobility, gait, and standing prolonged periods.  Physical Therapy will continue to follow patient for duration of hospitalization.    Patient continues to benefit from continued skilled PT services: at discharge to promote functional independence in home.  Anticipate patient will return home with home health PT.    PLAN  PT Treatment Plan: Bed mobility;Body mechanics;Endurance;Energy conservation;Patient education;Family education;Gait training;Balance training;Transfer training;Stoop training  Frequency (Obs): 5x/week    SUBJECTIVE  \"I want to be walking more\"    OBJECTIVE  Precautions: Bed/chair alarm      PAIN ASSESSMENT   Ratin  Location: no complaints of pain  Management Techniques: Activity promotion    BALANCE  Static Sitting: Good  Dynamic Sitting: Fair +  Static Standing: Fair  Dynamic Standing: Fair -    ACTIVITY TOLERANCE  Pulse: 82  Heart Rate Source: Monitor           O2 WALK  Oxygen Therapy  SPO2% on Room Air at Rest: 99  SPO2% Ambulation on Room Air: 96    AM-PAC '6-Clicks' INPATIENT SHORT FORM - BASIC  MOBILITY  How much difficulty does the patient currently have...  Patient Difficulty: Turning over in bed (including adjusting bedclothes, sheets and blankets)?: None   Patient Difficulty: Sitting down on and standing up from a chair with arms (e.g., wheelchair, bedside commode, etc.): None   Patient Difficulty: Moving from lying on back to sitting on the side of the bed?: A Little   How much help from another person does the patient currently need...   Help from Another: Moving to and from a bed to a chair (including a wheelchair)?: A Little   Help from Another: Need to walk in hospital room?: A Little   Help from Another: Climbing 3-5 steps with a railing?: A Little     AM-PAC Score:  Raw Score: 20   Approx Degree of Impairment: 35.83%   Standardized Score (AM-PAC Scale): 47.67   CMS Modifier (G-Code): CJ    FUNCTIONAL ABILITY STATUS  Functional Mobility/Gait Assessment  Gait Assistance: Supervision  Distance (ft): 250  Assistive Device: Rolling walker  Pattern: L Foot flat;R Foot flat (forward flexed at trunk, assist and cues for upright)  Stairs: Other (comment) (discussed stoop navigation and pt will have assist from daughter, can verbalize correct technique for going up/down stoop step)  Rolling: independent  Supine to Sit: supervision  Sit to Supine: supervision  Sit to Stand: modified independent    Additional information: RN approves participation in therapy session. Patient presents sitting up in chair and agreeable to PT. Reports she is still using purewick just in case but is trying to get up to bathroom as often as possible. She walked yesterday with her daughter and is overall feeling better but not quite herself. She is making progress with goals and states she is going home today or tomorrow, diet advanced today. She performs LE exs and sit to/from stand, gait in halls and is motivated to walk and get ready for home. Daughter in room at end of session and reports she can assist pt into home and get  her set up and then check in intermittently. All questions answered, needs in reach and daughter in room. RN aware    The patient's Approx Degree of Impairment: 35.83% has been calculated based on documentation in the [facility] '6 clicks' Inpatient Daily Activity Short Form.  Research supports that patients with this level of impairment may benefit from home with 24 hr care and no needs but I do recommend HHPT to maximize return to plf and independence with gait and stoop.  Final disposition will be made by interdisciplinary medical team.    THERAPEUTIC EXERCISES  Lower Extremity Alternating marching  Heel raises  LAQ  Quad sets  Toe raises  Mini-squats     Position Sitting & Standing       Patient End of Session: Up in chair;Needs met;Call light within reach;RN aware of session/findings;All patient questions and concerns addressed;Family present    CURRENT GOALS     Goals to be met by: 5/31/24  Patient Goal Patient's self-stated goal is: home    Goal #1 Patient is able to demonstrate supine - sit EOB @ level: modified independent      Goal #1   Current Status  Progressing: needing cues for rolling and pushing up to sitting   Goal #2 Patient is able to demonstrate transfers EOB to/from Chair/Wheelchair at assistance level: modified independent with walker - rolling      Goal #2  Current Status  MET 5/21 from chair, bed to RW   Goal #3 Patient is able to ambulate 300  feet with assist device: walker - rolling at assistance level: modified independent   Goal #3   Current Status  Progressing: walking 250' with RW and supervision, cues for upright posture and step length   Goal #4 Patient will negotiate 1 stairs/one curb w/ assistive device and supervision   Goal #4   Current Status  NT, did discuss today but pt deferred to try this AM   Goal #5 Patient to demonstrate independence with home activity/exercise instructions provided to patient in preparation for discharge.   Goal #5   Current Status  In progress   Goal #6      Goal #6  Current Status          Gait Trainin minutes  Therapeutic Activity: 12 minutes  Therapeutic Exercise: 13 minutes

## 2024-05-21 NOTE — CONSULTS
Is this a shared or split note between Advanced Practice Provider and Physician? Yes       [facility]   Gastroenterology Consultation Note    Rosaura Hermosillo  Patient Status:    Inpatient  Date of Admission:         5/15/2024, Hospital day #6  Attending:   Epifanio Chapman MD  PCP:     Enid Segal MD    Reason for Consultation:  Bright red blood per rectum     History of Present Illness:  Rosaura Hermosillo is a 89 year old female w/ PMHx of HTN, hyperlipidemia, OA, CKD, PE on eliquis, PSVT, GERD, hiatal hernia who presented to ER for abdominal pain, acid reflux and nausea. In ER was found to have SBO. She had improvement in pain with NG and has since had it removed. GI consulted for episode of rectal bleeding this am. Patient notes she had one episode of brbpr this am. No hematochezia or melena. No abdominal pain, nausea or vomiting today. Last cln over 10 years ago and was told she had hemorrhoids.     Hemoglobin has remained within normal. Today was 12.6. Patient has had no recurrence of rectal bleeding.     Pertinent Family Hx:  - No known history of esophageal, gastric or colon cancers  - No known IBD  - No known liver pathologies    Endoscopy Hx:  - Colonoscopy over 10 years ago     Social Hx:  - No tobacco use/No ETOH  - Denies illicit drug use  - Lives with: family  - Occupation: retired       History:  Past Medical History:    Abnormal heart rhythm    Cataract    extraction and lens implantation per NG.    Chronic pulmonary embolism (HCC)    Cystocele    repair management per. NG    Essential hypertension    Hemorrhoid    Hernia, hiatal    High cholesterol    Hyperlipidemia    S/P cholecystectomy    lap cholecystectomy per. NG     Past Surgical History:   Procedure Laterality Date    Breast biopsy Right     2011    Colonoscopy      Electrocardiogram, complete  05/09/2013    scanned to media tab    Removal gallbladder       Family History   Problem Relation Age of Onset    Heart Disorder  Father 47        heart attack cause of death    Musculo-skelatal Disorder Mother         osteoporosis      reports that she has quit smoking. Her smoking use included cigarettes. She has never used smokeless tobacco. She reports current alcohol use. She reports that she does not use drugs.    Allergies:  Allergies   Allergen Reactions    Macrobid [Nitrofurantoin] SHORTNESS OF BREATH     Macrobid was possible cause of wheezing and SOB prompting admission on 11/10/2021    Atorvastatin OTHER (SEE COMMENTS)     Double Vision    Statins OTHER (SEE COMMENTS)     Double vision       Medications:    Current Facility-Administered Medications:     apixaban (Eliquis) tab 2.5 mg, 2.5 mg, Oral, BID    dilTIAZem ER (CardIZEM CD) 24 hr cap 240 mg, 240 mg, Oral, Daily    pantoprazole (Protonix) 40 mg in sodium chloride 0.9% PF 10 mL IV push, 40 mg, Intravenous, Daily    potassium chloride 20 mEq in dextrose 5%-sodium chloride 0.45% 1000mL infusion premix, , Intravenous, Continuous    ondansetron (Zofran) 4 MG/2ML injection 4 mg, 4 mg, Intravenous, Q6H PRN    morphINE PF 2 MG/ML injection 1 mg, 1 mg, Intravenous, Q2H PRN **OR** morphINE PF 2 MG/ML injection 2 mg, 2 mg, Intravenous, Q2H PRN **OR** morphINE PF 4 MG/ML injection 4 mg, 4 mg, Intravenous, Q2H PRN    acetaminophen (Tylenol) rectal suppository 650 mg, 650 mg, Rectal, Q6H PRN    albuterol (Ventolin HFA) 108 (90 Base) MCG/ACT inhaler 2 puff, 2 puff, Inhalation, Q4H PRN    Review of Systems:   CONSTITUTIONAL:  negative for fevers, chills, unintentional weight loss   EYES:  negative for diplopia or change in vision   RESPIRATORY:  negative for severe shortness of breath  CARDIOVASCULAR:  negative for crushing sub-sternal chest pain  GASTROINTESTINAL:  see HPI  GENITOURINARY:  negative for dysuria or gross hematuria  INTEGUMENT/BREAST:  SKIN:  negative for jaundice or new rash   ALLERGIC/IMMUNOLOGIC:  negative for hay fever   ENDOCRINE:  negative for cold intolerance and heat  intolerance  MUSCULOSKELETAL:  negative for joint effusion/severe erythema  NEURO: negative for new loss of consciousness or dizziness   BEHAVIOR/PSYCH:  negative for psychotic behavior    Physical Exam:    Blood pressure 125/50, pulse 52, temperature 97.8 °F (36.6 °C), temperature source Oral, resp. rate 18, height 5' 4\" (1.626 m), weight 202 lb 12.8 oz (92 kg), SpO2 97%. Body mass index is 34.81 kg/m².    Gen: awake, alert patient, NAD  HEENT: EOMI, the sclera appears anicteric, oropharynx clear, mucus membranes appear moist  CV: RRR  Lung: no conversational dyspnea   Abdomen: soft NTND abdomen with NABS appreciated   Back: No CVA tenderness   Skin: dry, warm, no jaundice  Ext: no LE edema is evident  Neuro: Alert and interactive  Psych: calm, cooperative    Laboratory Data:  Lab Results   Component Value Date    WBC 4.0 05/21/2024    HGB 12.6 05/21/2024    HCT 38.9 05/21/2024    .0 05/21/2024       Imaging:  No results found.    Assessment & Plan   Rosaura Hermosillo is a 89 year old female w/ PMHx of HTN, hyperlipidemia, OA, CKD, PE on eliquis, PSVT, GERD, hiatal hernia who presented to ER for abdominal pain, acid reflux and nausea. In ER was found to have SBO. She had improvement in pain with NG and has since had it removed. GI consulted for episode of rectal bleeding this am. Patient notes she had one episode of brbpr this am. No hematochezia or melena. No abdominal pain, nausea or vomiting today. Last cln over 10 years ago and was told she had hemorrhoids.     Hemoglobin has remained within normal. Today was 12.6. Patient has had no recurrence of rectal bleeding.     #BRBPR  -one episode without recurrence  -hemoglobin stable  -due to patient age and co morbidities will plan to hold off on endoscopic evaluation unless drop in hemoglobin or continued overt bleeding    Recommend:  -trend hemoglobin  -reserve endoscopic evaluation for worsening anemia or continued overt GIB  -continue Eliquis      Thank  you for the opportunity to participate in the care of this patient.    Case discussed with Claudia Gardner MD and Kassy KAUR.    Kamla Thorpe PA-C  [facility]  5/21/2024

## 2024-05-22 ENCOUNTER — APPOINTMENT (OUTPATIENT)
Dept: GENERAL RADIOLOGY | Facility: HOSPITAL | Age: 89
DRG: 390 | End: 2024-05-22
Attending: HOSPITALIST

## 2024-05-22 PROCEDURE — 74019 RADEX ABDOMEN 2 VIEWS: CPT | Performed by: HOSPITALIST

## 2024-05-22 PROCEDURE — 99232 SBSQ HOSP IP/OBS MODERATE 35: CPT | Performed by: SURGERY

## 2024-05-22 PROCEDURE — 99233 SBSQ HOSP IP/OBS HIGH 50: CPT | Performed by: HOSPITALIST

## 2024-05-22 RX ORDER — DOCUSATE SODIUM 100 MG/1
100 CAPSULE, LIQUID FILLED ORAL DAILY
Status: DISCONTINUED | OUTPATIENT
Start: 2024-05-22 | End: 2024-05-23

## 2024-05-22 RX ORDER — POLYETHYLENE GLYCOL 3350 17 G/17G
17 POWDER, FOR SOLUTION ORAL DAILY PRN
Status: SHIPPED | COMMUNITY
Start: 2024-05-22

## 2024-05-22 RX ORDER — FUROSEMIDE 40 MG/1
40 TABLET ORAL DAILY
Status: SHIPPED | COMMUNITY
Start: 2024-05-22

## 2024-05-22 RX ORDER — ACETAMINOPHEN 650 MG/1
650 SUPPOSITORY RECTAL EVERY 6 HOURS PRN
Status: SHIPPED | COMMUNITY
Start: 2024-05-22

## 2024-05-22 NOTE — PLAN OF CARE
Problem: Patient Centered Care  Goal: Patient preferences are identified and integrated in the patient's plan of care  Description: Interventions:  - What would you like us to know as we care for you? From home alone  - Provide timely, complete, and accurate information to patient/family  - Incorporate patient and family knowledge, values, beliefs, and cultural backgrounds into the planning and delivery of care  - Encourage patient/family to participate in care and decision-making at the level they choose  - Honor patient and family perspectives and choices  Outcome: Progressing     Problem: PAIN - ADULT  Goal: Verbalizes/displays adequate comfort level or patient's stated pain goal  Description: INTERVENTIONS:  - Encourage pt to monitor pain and request assistance  - Assess pain using appropriate pain scale  - Administer analgesics based on type and severity of pain and evaluate response  - Implement non-pharmacological measures as appropriate and evaluate response  - Consider cultural and social influences on pain and pain management  - Manage/alleviate anxiety  - Utilize distraction and/or relaxation techniques  - Monitor for opioid side effects  - Notify MD/LIP if interventions unsuccessful or patient reports new pain  - Anticipate increased pain with activity and pre-medicate as appropriate  Outcome: Progressing     Problem: HEMATOLOGIC - ADULT  Goal: Maintains hematologic stability  Description: INTERVENTIONS  - Assess for signs and symptoms of bleeding or hemorrhage  - Monitor labs and vital signs for trends  - Administer supportive blood products/factors, fluids and medications as ordered and appropriate  - Administer supportive blood products/factors as ordered and appropriate  Outcome: Progressing     Monitoring vital signs- stable at this time. No bleeding noted.  Safety and fall precautions maintained- bed alarm on, bed locked in lowest position, call light within reach.      [phone]

## 2024-05-22 NOTE — PROGRESS NOTES
[facility]  part of [facility]    Progress Note    Rosaura Hermosillo Patient Status:  Inpatient    1933 MRN [id number]   Location [facility] 5SW/SE Attending Johanne Garcia MD   Hosp Day # 7 PCP Enid Segal MD     Assessment and Plan:     Doing well post obstruction appears relieved.  Concerned she has not had a bowel movement.  Will add milk of magnesia.  No planned surgical intervention.  Will sign off please call if needed    Subjective:   Comfortable asking for a laxative    Objective:   Patient Vitals for the past 24 hrs:   BP Temp Temp src Pulse Resp SpO2   24 0545 134/50 97.8 °F (36.6 °C) Oral 55 18 94 %   24 2043 160/65 97.6 °F (36.4 °C) Oral 66 16 97 %   24 1603 138/51 97.3 °F (36.3 °C) Oral 54 18 97 %   24 0945 -- -- -- 82 -- --   24 0932 130/56 98 °F (36.7 °C) Oral 62 20 97 %       Intake/Output                   24 0700 - 24 0659 24 0700 - 24 0659 24 0700 - 24 0659       Intake    P.O.  --  920  --    P.O. -- 920 --    I.V.  504.2  --  --    Volume (mL) (potassium chloride 20 mEq in dextrose 5%-sodium chloride 0.45% 1000mL infusion premix) 504.2 -- --    Total Intake 504.2 920 --       Output    Urine  1000  300  --    Urine 700 300 --    Urine Occurrence 4 x 2 x --    Incontinent Urine Occurrence 1 x 1 x --    Output (mL) (External Urinary Catheter) 300 -- --    Total Output 1000 300 --       Net I/O     -495.8 620 --            Exam: Abdomen soft nontender nondistended    Results:     Lab Results   Component Value Date    WBC 4.0 2024    HGB 12.6 2024    HCT 38.9 2024    .0 2024    CREATSERUM 1.07 (H) 2024    BUN 10 2024     2024    K 4.0 2024     2024    CO2 24.0 2024    GLU 96 2024    CA 9.3 2024    ALB 4.5 05/15/2024    ALKPHO 79 05/15/2024    BILT 0.5 05/15/2024    TP 8.1 05/15/2024    AST 24  05/15/2024    ALT 14 05/15/2024    PTT 31.6 10/16/2023    T4F 1.0 06/04/2022    TSH 2.880 06/04/2022    LIP 69 05/15/2024    DDIMER 1.53 (H) 08/07/2021    MG 2.3 10/16/2023    PHOS 2.8 12/08/2023    TROP <0.045 11/22/2020    B12 281 10/16/2023       No results found.            Tevin Hathaway MD  5/22/2024

## 2024-05-22 NOTE — SPIRITUAL CARE NOTE
Spiritual Care Visit Note    Patient Name: Rosaura Hermosillo Date of Spiritual Care Visit: 24   : 1933 Primary Dx: SBO (small bowel obstruction) (HCC)       Referred By: Referral From:     Spiritual Care Taxonomy:    Intended Effects: Demonstrate caring and concern    Methods: Collaborate with care team member;Offer support    Interventions: Active listening;Ask guided questions;Silent prayer    Visit Type/Summary:     - Spiritual Care: Offered empathic listening and emotional support. Patient and family expressed appreciation for  visit. Provided support for Patient's spiritual/Voodoo requests. Offered prayer.    Spiritual Care support can be requested via an Epic consult. For urgent/immediate needs, please contact the On Call  at: [place]: ext [phone]    REA Mcgarry   [phone]

## 2024-05-22 NOTE — PLAN OF CARE
Problem: Patient Centered Care  Goal: Patient preferences are identified and integrated in the patient's plan of care  Description: Interventions:  - What would you like us to know as we care for you? Lives at home alone  - Provide timely, complete, and accurate information to patient/family  - Incorporate patient and family knowledge, values, beliefs, and cultural backgrounds into the planning and delivery of care  - Encourage patient/family to participate in care and decision-making at the level they choose  - Honor patient and family perspectives and choices  Outcome: Progressing        Problem: PAIN - ADULT  Goal: Verbalizes/displays adequate comfort level or patient's stated pain goal  Description: INTERVENTIONS:  - Encourage pt to monitor pain and request assistance  - Assess pain using appropriate pain scale  - Administer analgesics based on type and severity of pain and evaluate response  - Implement non-pharmacological measures as appropriate and evaluate response  - Consider cultural and social influences on pain and pain management  - Manage/alleviate anxiety  - Utilize distraction and/or relaxation techniques  - Monitor for opioid side effects  - Notify MD/LIP if interventions unsuccessful or patient reports new pain  - Anticipate increased pain with activity and pre-medicate as appropriate  Outcome: Progressing     Problem: HEMATOLOGIC - ADULT  Goal: Maintains hematologic stability  Description: INTERVENTIONS  - Assess for signs and symptoms of bleeding or hemorrhage  - Monitor labs and vital signs for trends  - Administer supportive blood products/factors, fluids and medications as ordered and appropriate  - Administer supportive blood products/factors as ordered and appropriate  Outcome: Progressing       No complaints of pain at this time, no bowel movement. X ray abd ordered per MD. Calls appropriately.

## 2024-05-22 NOTE — PROGRESS NOTES
[facility]  part of [facility]  Hospitalist Progress Note     Rosaura France Hermosillo Patient Status:  Inpatient    1933  89 year old CSN [id number]   Location 559/559-A Attending Epifanio Chapman MD   Hosp Day # 7 PCP Enid Segal MD     Assessment & Plan:   ----------------------------------  Small bowel obstruction.  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  Pain resolved.  Passing some flatus, no stool.    Diet advanced, tolerated dinner and good breakfast, No BM yet  -Surgical consult appreciated, ok home  -NG tube: Out   -soft diet  -dc IVF  -Pain control  -Encourage activity as tolerated  -check abd XR for f/u (pt is very anxious about recurrent symptoms especially that still no BM)    Rectal bleed. Suspect hemorrhoids though can't r/o other more serious causes.    -GI consult appreciated, No plans for endoscopy  -cont eliquis  -small amount of blood again today  after wiping, monitor  -pt encouraged not to strain, add stool softeners    Chronic constipation  Pt states it is not unusual not to have BM for 5 days  -we discussed diet, hydration, med use as she needs a good bowel regimen    Dehydration.  resolved    Recent UTI.  Repeat cxs neg x2    Other problems  Hypertension, restart diltiazem, continue to hold metoprolol  CHF, on lasix bid prior admission, now on hold-->restart ?daily, follows at HF clinic  Dyslipidemia  Osteoarthritis  CKD stage III  History of PE, Eliquis  PSVT, restarted diltiazem  Obesity BMI 34  GERD  Hiatal hernia    Social: lives by herself, cooks, cleans and doing laundry. Supportive children nearby    dvt prophylaxis: eliquis  code status: full code  dispo: Home today if XR reassuring    I personally reviewed the available laboratories, imaging including creatinine. I discussed/will discuss the case with GI. I ordered laboratories, studies including BMP. I adjusted medications including Protonix. Medical decision making high,  risk is high.      Subjective:   ----------------------------------  Diet advanced, tolerated dinner and good breakfast, No BM yet      Objective:   Chief Complaint:   Chief Complaint   Patient presents with    Abdomen/Flank Pain     ----------------------------------  Temp:  [97.5 °F (36.4 °C)-97.8 °F (36.6 °C)] 97.5 °F (36.4 °C)  Pulse:  [55-78] 78  Resp:  [16-18] 18  BP: (134-160)/(50-72) 140/72  SpO2:  [94 %-97 %] 97 %  Gen: A+Ox3.  No distress.   HEENT: NCAT, neck supple, no carotid bruit.  CV: RRR, S1S2, and intact distal pulses. No gallop, rub, murmur.  Pulm: Effort and breath sounds normal. No distress, wheezes, rales, rhonchi.  Abd: Soft, NTND, BS hypoactive, no mass, no HSM, no rebound/guarding.  Rectal exam shows min red blood, large ext hemorrhoid at 3 oclock.  Neuro: Normal reflexes, CN. Sensory/motor exams grossly normal deficit.   MS: No joint effusions.  No peripheral edema.  Skin: Skin is warm and dry. No rashes, erythema, diaphoresis.   Psych: Normal mood and affect. Calm, cooperative    Labs:  Lab Results   Component Value Date    HGB 12.6 05/21/2024    WBC 4.0 05/21/2024    .0 05/21/2024     05/21/2024    K 4.0 05/21/2024    CREATSERUM 1.07 (H) 05/21/2024    AST 24 05/15/2024    ALT 14 05/15/2024    TROP <0.045 11/22/2020          docusate sodium  100 mg Oral Daily    pantoprazole  40 mg Oral QAM AC    apixaban  2.5 mg Oral BID    dilTIAZem ER  240 mg Oral Daily       magnesium hydroxide    HYDROcodone-acetaminophen    ondansetron    acetaminophen    albuterol  **Certification      PHYSICIAN Certification of Need for Inpatient Hospitalization - Initial Certification    Patient will require inpatient services that will reasonably be expected to span two midnight's based on the clinical documentation in H+P.   Based on patients current state of illness, I anticipate that, after discharge, patient will require TBD.

## 2024-05-22 NOTE — PHYSICAL THERAPY NOTE
PHYSICAL THERAPY TREATMENT NOTE - INPATIENT     Room Number: 559/559-A       Presenting Problem: SBO with dehydration and weakness.  Sx team on consult--NG tube on wall suction in place .    PMH sign for prior ABD sx , HTN , OA , CKD , PE , PSVT  Co-Morbidities : Hx HTN, CKD3, OA    Problem List  Principal Problem:    SBO (small bowel obstruction) (HCC)  Active Problems:    Small bowel obstruction (HCC)    Rectal bleed    Rectal bleeding      PHYSICAL THERAPY ASSESSMENT   Patient demonstrates good  progress this session, goals  remain in progress.    Patient continues to function below baseline with bed mobility, transfers, gait, stair negotiation, maintaining seated position, standing prolonged periods, and performing household tasks.  Contributing factors to remaining limitations include decreased functional strength, decreased endurance/aerobic capacity, and impaired dynamic standing balance.  Next session anticipate patient to progress bed mobility, transfers, gait, stair negotiation, maintaining seated position, standing prolonged periods, and performing household tasks.  Physical Therapy will continue to follow patient for duration of hospitalization.    Patient continues to benefit from continued skilled PT services: at discharge to promote functional independence and safety with additional support and return home with home health PT.    PLAN  PT Treatment Plan: Bed mobility;Body mechanics;Endurance;Energy conservation;Patient education;Family education;Gait training;Balance training;Transfer training;Stoop training  Frequency (Obs): 5x/week    SUBJECTIVE  \"The other therapist told me to walk, so I'm walking\"     OBJECTIVE  Precautions: Bed/chair alarm    PAIN ASSESSMENT   Ratin  Location: no complaints of pain  Management Techniques: Activity promotion    BALANCE  Static Sitting: Good  Dynamic Sitting: Fair +  Static Standing: Fair  Dynamic Standing: Fair -    ACTIVITY TOLERANCE  Pulse: 78  Heart Rate  Source: Monitor     BP: 140/72  BP Location: Right arm  BP Method: Automatic  Patient Position: Sitting     O2 WALK  Oxygen Therapy  SPO2% Ambulation on Room Air: 97    AM-PAC '6-Clicks' INPATIENT SHORT FORM - BASIC MOBILITY  How much difficulty does the patient currently have...  Patient Difficulty: Turning over in bed (including adjusting bedclothes, sheets and blankets)?: None   Patient Difficulty: Sitting down on and standing up from a chair with arms (e.g., wheelchair, bedside commode, etc.): A Little   Patient Difficulty: Moving from lying on back to sitting on the side of the bed?: A Little   How much help from another person does the patient currently need...   Help from Another: Moving to and from a bed to a chair (including a wheelchair)?: A Little   Help from Another: Need to walk in hospital room?: A Little   Help from Another: Climbing 3-5 steps with a railing?: A Little     AM-PAC Score:  Raw Score: 19   Approx Degree of Impairment: 41.77%   Standardized Score (AM-PAC Scale): 45.44   CMS Modifier (G-Code): CK    FUNCTIONAL ABILITY STATUS  Functional Mobility/Gait Assessment  Gait Assistance:  (SBA)  Distance (ft): 250ft  Assistive Device: Rolling walker  Pattern: Shuffle;R Foot flat;L Foot flat (decreased bill speed, flexed posture. Cues for safe management of RW w turns. Encouraged standing rest breaks)  Stairs: Other (comment) (discussed stoop navigation and pt will have assist from daughter, can verbalize correct technique for going up/down stoop step)  Sit to Stand: supervision    Additional information: Patient received ambulating with PCT in [facility]. RN approved activity. Therapist educated pt on POC and physiological benefits of mobilization. Education on activity pacing, energy conservation and pursed lipped breathing technique. Patient agreeable to participate. Denies pain. *SpO2 on room air post activity: 97% HR: 78bpm Bp sittin/72mmHg    The patient's Approx Degree of Impairment:  41.77% has been calculated based on documentation in the [facility] '6 clicks' Inpatient Daily Activity Short Form.  Research supports that patients with this level of impairment may benefit from home with home PT.  Final disposition will be made by interdisciplinary medical team.    THERAPEUTIC EXERCISES  Lower Extremity Alternating marching  Ankle pumps  LAQ     Position Sitting       Patient End of Session: Up in chair;Needs met;Call light within reach;RN aware of session/findings    CURRENT GOALS   Goals to be met by: 5/31/24  Patient Goal Patient's self-stated goal is: home    Goal #1 Patient is able to demonstrate supine - sit EOB @ level: modified independent      Goal #1   Current Status NT   Goal #2 Patient is able to demonstrate transfers EOB to/from Chair/Wheelchair at assistance level: modified independent with walker - rolling      Goal #2  Current Status  MET 5/21 from chair, bed to RW   Goal #3 Patient is able to ambulate 300  feet with assist device: walker - rolling at assistance level: modified independent   Goal #3   Current Status  SBA 250ft RW   Goal #4 Patient will negotiate 1 stairs/one curb w/ assistive device and supervision   Goal #4   Current Status  NT- discussed technique- pt expressed understanding   Goal #5 Patient to demonstrate independence with home activity/exercise instructions provided to patient in preparation for discharge.   Goal #5   Current Status  In progress     Gait Training: 15 minutes  Therapeutic Exercise: 8 minutes

## 2024-05-23 VITALS
OXYGEN SATURATION: 93 % | TEMPERATURE: 98 F | WEIGHT: 195.31 LBS | BODY MASS INDEX: 33.34 KG/M2 | HEART RATE: 84 BPM | HEIGHT: 64 IN | DIASTOLIC BLOOD PRESSURE: 68 MMHG | SYSTOLIC BLOOD PRESSURE: 152 MMHG | RESPIRATION RATE: 18 BRPM

## 2024-05-23 LAB
ALBUMIN SERPL-MCNC: 3.6 G/DL (ref 3.2–4.8)
ANION GAP SERPL CALC-SCNC: 7 MMOL/L (ref 0–18)
BASOPHILS # BLD AUTO: 0.04 X10(3) UL (ref 0–0.2)
BASOPHILS NFR BLD AUTO: 0.9 %
BUN BLD-MCNC: 24 MG/DL (ref 9–23)
BUN/CREAT SERPL: 20.5 (ref 10–20)
CALCIUM BLD-MCNC: 8.8 MG/DL (ref 8.7–10.4)
CHLORIDE SERPL-SCNC: 111 MMOL/L (ref 98–112)
CO2 SERPL-SCNC: 25 MMOL/L (ref 21–32)
CREAT BLD-MCNC: 1.17 MG/DL
DEPRECATED RDW RBC AUTO: 43 FL (ref 35.1–46.3)
EGFRCR SERPLBLD CKD-EPI 2021: 44 ML/MIN/1.73M2 (ref 60–?)
EOSINOPHIL # BLD AUTO: 0.14 X10(3) UL (ref 0–0.7)
EOSINOPHIL NFR BLD AUTO: 3 %
ERYTHROCYTE [DISTWIDTH] IN BLOOD BY AUTOMATED COUNT: 13.4 % (ref 11–15)
GLUCOSE BLD-MCNC: 111 MG/DL (ref 70–99)
HCT VFR BLD AUTO: 37.6 %
HGB BLD-MCNC: 12.7 G/DL
IMM GRANULOCYTES # BLD AUTO: 0 X10(3) UL (ref 0–1)
IMM GRANULOCYTES NFR BLD: 0 %
LYMPHOCYTES # BLD AUTO: 1.83 X10(3) UL (ref 1–4)
LYMPHOCYTES NFR BLD AUTO: 39.7 %
MAGNESIUM SERPL-MCNC: 1.9 MG/DL (ref 1.6–2.6)
MCH RBC QN AUTO: 29.9 PG (ref 26–34)
MCHC RBC AUTO-ENTMCNC: 33.8 G/DL (ref 31–37)
MCV RBC AUTO: 88.5 FL
MONOCYTES # BLD AUTO: 0.47 X10(3) UL (ref 0.1–1)
MONOCYTES NFR BLD AUTO: 10.2 %
NEUTROPHILS # BLD AUTO: 2.13 X10 (3) UL (ref 1.5–7.7)
NEUTROPHILS # BLD AUTO: 2.13 X10(3) UL (ref 1.5–7.7)
NEUTROPHILS NFR BLD AUTO: 46.2 %
OSMOLALITY SERPL CALC.SUM OF ELEC: 301 MOSM/KG (ref 275–295)
PHOSPHATE SERPL-MCNC: 3.7 MG/DL (ref 2.4–5.1)
PLATELET # BLD AUTO: 158 10(3)UL (ref 150–450)
POTASSIUM SERPL-SCNC: 3.7 MMOL/L (ref 3.5–5.1)
RBC # BLD AUTO: 4.25 X10(6)UL
SODIUM SERPL-SCNC: 143 MMOL/L (ref 136–145)
WBC # BLD AUTO: 4.6 X10(3) UL (ref 4–11)

## 2024-05-23 PROCEDURE — 99239 HOSP IP/OBS DSCHRG MGMT >30: CPT | Performed by: HOSPITALIST

## 2024-05-23 RX ORDER — SPIRONOLACTONE 25 MG/1
25 TABLET ORAL DAILY
Status: SHIPPED | COMMUNITY
Start: 2024-05-23

## 2024-05-23 RX ORDER — PSEUDOEPHEDRINE HCL 30 MG
100 TABLET ORAL DAILY
Qty: 30 CAPSULE | Refills: 0 | Status: SHIPPED | OUTPATIENT
Start: 2024-05-24

## 2024-05-23 NOTE — OCCUPATIONAL THERAPY NOTE
OCCUPATIONAL THERAPY TREATMENT NOTE - INPATIENT        Room Number: 559/559-A     Presenting Problem: SBO, dehydration with mild acute chronic kidney injury    Problem List  Principal Problem:    SBO (small bowel obstruction) (HCC)  Active Problems:    Small bowel obstruction (HCC)    Rectal bleed    Rectal bleeding      OCCUPATIONAL THERAPY ASSESSMENT   Patient demonstrates good  progress this session, goals remain in progress.    Patient continues to function near baseline with  ADLs and fx mobility/transfers .   Contributing factors to remaining limitations include decreased functional strength, decreased endurance, and decreased muscular endurance.  Next session anticipate patient to progress dynamic standing balance, functional standing tolerance, and energy conservation strategies.  Occupational Therapy will continue to follow patient for duration of hospitalization.    Patient continues to benefit from continued skilled OT services: at discharge to promote functional independence and safety with additional support and return home with home health OT.     PLAN  OT Treatment Plan: Balance activities;Energy conservation/work simplification techniques;ADL training;Functional transfer training;Endurance training;Patient/Family education;Compensatory technique education  OT Device Recommendations: Shower chair    SUBJECTIVE  \"I like to be up and moving. I think I've gotten a little deconditioned since I got here.\"    OBJECTIVE  Precautions: Abdominal protective strategies;Bed/chair alarm    WEIGHT BEARING RESTRICTION  None    PAIN ASSESSMENT  Rating: Unable to rate  Location: right lower abdomen intermittent pain  Management Techniques: Relaxation    ACTIVITY TOLERANCE  Pulse: 84 (with ax)  Heart Rate Source: Monitor     BP: 152/81078315  BP Location: Right arm  BP Method: Automatic  Patient Position: Sitting    O2 SATURATIONS  Oxygen Therapy  SPO2% Ambulation on Room Air: 97    ACTIVITIES OF DAILY LIVING  ASSESSMENT  AM-PAC ‘6-Clicks’ Inpatient Daily Activity Short Form  How much help from another person does the patient currently need…  -   Putting on and taking off regular lower body clothing?: A Little  -   Bathing (including washing, rinsing, drying)?: A Little  -   Toileting, which includes using toilet, bedpan or urinal? : A Little  -   Putting on and taking off regular upper body clothing?: A Little  -   Taking care of personal grooming such as brushing teeth?: A Little  -   Eating meals?: None    AM-PAC Score:  Score: 19  Approx Degree of Impairment: 42.8%  Standardized Score (AM-PAC Scale): 40.22  CMS Modifier (G-Code): CK    BED MOBILITY  Supine to Sit: SUP     FUNCTIONAL TRANSFER ASSESSMENT  Sit to Stand from EOB: SBA  Toilet Transfer: SBA  Chair Transfer: SBA    FUNCTIONAL MOBILITY  SBA for hallway fx mobility using RW  Comments:  Benefited from instruction in energy conservation strategies, such as activity pacing and taking breaks as needed.     FUNCTIONAL ADL ASSESSMENT  Grooming: stand-by assist  LB Dressing: stand-by assist  Toileting: supervision    EDUCATION PROVIDED  Patient: Role of Occupational Therapy; Discharge Recommendations; Plan of Care; Functional Transfer Techniques; Fall Prevention; Posture/Positioning; Energy Conservation; Proper Body Mechanics; Compensatory ADL Techniques  Patient's Response to Education: Verbalized Understanding; Returned Demonstration    The patient's Approx Degree of Impairment: 42.8% has been calculated based on documentation in the [facility] '6 clicks' Inpatient Daily Activity Short Form.  Research supports that patients with this level of impairment may benefit from  OT.  Final disposition will be made by interdisciplinary medical team.    Patient End of Session: Up in chair;Needs met;Call light within reach;RN aware of session/findings;All patient questions and concerns addressed;Alarm set    OT Goals:   Patient will complete functional transfer with MI  Comment:  ongoing    Patient will complete toileting with MI  Comment: ongoing    Patient will complete LB dressing with MI  Comment: ongoing    Patient will complete item retrieval with MI  Comment: ongoing          Goals  on: 24  Frequency: 3-5x/week    OT Session Time  Self-Care Home Management: 13 minutes  Therapeutic Activity: 10 minutes    Mily Aldana OTR/L  [facility]  #[id number]

## 2024-05-23 NOTE — PLAN OF CARE
Problem: Patient Centered Care  Goal: Patient preferences are identified and integrated in the patient's plan of care  Description: Interventions:  - What would you like us to know as we care for you? From home alone  - Provide timely, complete, and accurate information to patient/family  - Incorporate patient and family knowledge, values, beliefs, and cultural backgrounds into the planning and delivery of care  - Encourage patient/family to participate in care and decision-making at the level they choose  - Honor patient and family perspectives and choices  Outcome: Adequate for Discharge     Problem: Patient/Family Goals  Goal: Patient/Family Long Term Goal  Description: Patient's Long Term Goal: Discharge from the hospital    Interventions:  - Monitor vital signs  - Monitor appropriate labs  - Pain management  - Administer medications per order  - Follow MD orders  - Diagnostics per order  - Update / inform patient and family on plan of care  - Discharge planning  - See additional Care Plan goals for specific interventions  Outcome: Adequate for Discharge  Goal: Patient/Family Short Term Goal  Description: Patient's Short Term Goal: Improve small bowel obstruction      Interventions:   - Monitor vital signs  - Monitor appropriate labs  - Pain management  - Administer medications per order  - Follow MD orders  - Diagnostics per order  - Update / inform patient and family on plan of care  - See additional Care Plan goals for specific interventions  Outcome: Adequate for Discharge     Problem: PAIN - ADULT  Goal: Verbalizes/displays adequate comfort level or patient's stated pain goal  Description: INTERVENTIONS:  - Encourage pt to monitor pain and request assistance  - Assess pain using appropriate pain scale  - Administer analgesics based on type and severity of pain and evaluate response  - Implement non-pharmacological measures as appropriate and evaluate response  - Consider cultural and social influences on  pain and pain management  - Manage/alleviate anxiety  - Utilize distraction and/or relaxation techniques  - Monitor for opioid side effects  - Notify MD/LIP if interventions unsuccessful or patient reports new pain  - Anticipate increased pain with activity and pre-medicate as appropriate  Outcome: Adequate for Discharge     Problem: SAFETY ADULT - FALL  Goal: Free from fall injury  Description: INTERVENTIONS:  - Assess pt frequently for physical needs  - Identify cognitive and physical deficits and behaviors that affect risk of falls.  - [place] fall precautions as indicated by assessment.  - Educate pt/family on patient safety including physical limitations  - Instruct pt to call for assistance with activity based on assessment  - Modify environment to reduce risk of injury  - Provide assistive devices as appropriate  - Consider OT/PT consult to assist with strengthening/mobility  - Encourage toileting schedule  Outcome: Adequate for Discharge     Problem: DISCHARGE PLANNING  Goal: Discharge to home or other facility with appropriate resources  Description: INTERVENTIONS:  - Identify barriers to discharge w/pt and caregiver  - Include patient/family/discharge partner in discharge planning  - Arrange for needed discharge resources and transportation as appropriate  - Identify discharge learning needs (meds, wound care, etc)  - Arrange for interpreters to assist at discharge as needed  - Consider post-discharge preferences of patient/family/discharge partner  - Complete POLST form as appropriate  - Assess patient's ability to be responsible for managing their own health  - Refer to Case Management Department for coordinating discharge planning if the patient needs post-hospital services based on physician/LIP order or complex needs related to functional status, cognitive ability or social support system  Outcome: Adequate for Discharge     Problem: HEMATOLOGIC - ADULT  Goal: Maintains hematologic  stability  Description: INTERVENTIONS  - Assess for signs and symptoms of bleeding or hemorrhage  - Monitor labs and vital signs for trends  - Administer supportive blood products/factors, fluids and medications as ordered and appropriate  - Administer supportive blood products/factors as ordered and appropriate  Outcome: Adequate for Discharge  Goal: Free from bleeding injury  Description: (Example usage: patient with low platelets)  INTERVENTIONS:  - Avoid intramuscular injections, enemas and rectal medication administration  - Ensure safe mobilization of patient  - Hold pressure on venipuncture sites to achieve adequate hemostasis  - Assess for signs and symptoms of internal bleeding  - Monitor lab trends  - Patient is to report abnormal signs of bleeding to staff  - Avoid use of toothpicks and dental floss  - Use electric shaver for shaving  - Use soft bristle tooth brush  - Limit straining and forceful nose blowing  Outcome: Adequate for Discharge     Problem: CARDIOVASCULAR - ADULT  Goal: Maintains optimal cardiac output and hemodynamic stability  Description: INTERVENTIONS:  - Monitor vital signs, rhythm, and trends  - Monitor for bleeding, hypotension and signs of decreased cardiac output  - Evaluate effectiveness of vasoactive medications to optimize hemodynamic stability  - Monitor arterial and/or venous puncture sites for bleeding and/or hematoma  - Assess quality of pulses, skin color and temperature  - Assess for signs of decreased coronary artery perfusion - ex. Angina  - Evaluate fluid balance, assess for edema, trend weights  Outcome: Adequate for Discharge  Goal: Absence of cardiac arrhythmias or at baseline  Description: INTERVENTIONS:  - Continuous cardiac monitoring, monitor vital signs, obtain 12 lead EKG if indicated  - Evaluate effectiveness of antiarrhythmic and heart rate control medications as ordered  - Initiate emergency measures for life threatening arrhythmias  - Monitor electrolytes  and administer replacement therapy as ordered  Outcome: Adequate for Discharge     Problem: GASTROINTESTINAL - ADULT  Goal: Minimal or absence of nausea and vomiting  Description: INTERVENTIONS:  - Maintain adequate hydration with IV or PO as ordered and tolerated  - Nasogastric tube to low intermittent suction as ordered  - Evaluate effectiveness of ordered antiemetic medications  - Provide nonpharmacologic comfort measures as appropriate  - Advance diet as tolerated, if ordered  - Obtain nutritional consult as needed  - Evaluate fluid balance  Outcome: Adequate for Discharge  Goal: Maintains or returns to baseline bowel function  Description: INTERVENTIONS:  - Assess bowel function  - Maintain adequate hydration with IV or PO as ordered and tolerated  - Evaluate effectiveness of GI medications  - Encourage mobilization and activity  - Obtain nutritional consult as needed  - Establish a toileting routine/schedule  - Consider collaborating with pharmacy to review patient's medication profile  Outcome: Adequate for Discharge       Discharge paperwork reviewed with patient, verbalized understanding. Removed peripheral IV. Belongings collected, patient picked up by daughter.

## 2024-05-23 NOTE — PLAN OF CARE
Problem: Patient Centered Care  Goal: Patient preferences are identified and integrated in the patient's plan of care  Description: Interventions:  - What would you like us to know as we care for you? From home alone  - Provide timely, complete, and accurate information to patient/family  - Incorporate patient and family knowledge, values, beliefs, and cultural backgrounds into the planning and delivery of care  - Encourage patient/family to participate in care and decision-making at the level they choose  - Honor patient and family perspectives and choices  Outcome: Progressing     Problem: Patient/Family Goals  Goal: Patient/Family Long Term Goal  Description: Patient's Long Term Goal: Discharge from the hospital    Interventions:  - Monitor vital signs  - Monitor appropriate labs  - Pain management  - Administer medications per order  - Follow MD orders  - Diagnostics per order  - Update / inform patient and family on plan of care  - Discharge planning  - See additional Care Plan goals for specific interventions  Outcome: Progressing  Goal: Patient/Family Short Term Goal  Description: Patient's Short Term Goal: Improve small bowel obstruction      Interventions:   - Monitor vital signs  - Monitor appropriate labs  - Pain management  - Administer medications per order  - Follow MD orders  - Diagnostics per order  - Update / inform patient and family on plan of care  - See additional Care Plan goals for specific interventions  Outcome: Progressing     Problem: PAIN - ADULT  Goal: Verbalizes/displays adequate comfort level or patient's stated pain goal  Description: INTERVENTIONS:  - Encourage pt to monitor pain and request assistance  - Assess pain using appropriate pain scale  - Administer analgesics based on type and severity of pain and evaluate response  - Implement non-pharmacological measures as appropriate and evaluate response  - Consider cultural and social influences on pain and pain management  -  Manage/alleviate anxiety  - Utilize distraction and/or relaxation techniques  - Monitor for opioid side effects  - Notify MD/LIP if interventions unsuccessful or patient reports new pain  - Anticipate increased pain with activity and pre-medicate as appropriate  Outcome: Progressing     Problem: HEMATOLOGIC - ADULT  Goal: Maintains hematologic stability  Description: INTERVENTIONS  - Assess for signs and symptoms of bleeding or hemorrhage  - Monitor labs and vital signs for trends  - Administer supportive blood products/factors, fluids and medications as ordered and appropriate  - Administer supportive blood products/factors as ordered and appropriate  Outcome: Progressing  Goal: Free from bleeding injury  Description: (Example usage: patient with low platelets)  INTERVENTIONS:  - Avoid intramuscular injections, enemas and rectal medication administration  - Ensure safe mobilization of patient  - Hold pressure on venipuncture sites to achieve adequate hemostasis  - Assess for signs and symptoms of internal bleeding  - Monitor lab trends  - Patient is to report abnormal signs of bleeding to staff  - Avoid use of toothpicks and dental floss  - Use electric shaver for shaving  - Use soft bristle tooth brush  - Limit straining and forceful nose blowing  Outcome: Progressing     Problem: SAFETY ADULT - FALL  Goal: Free from fall injury  Description: INTERVENTIONS:  - Assess pt frequently for physical needs  - Identify cognitive and physical deficits and behaviors that affect risk of falls.  - [place] fall precautions as indicated by assessment.  - Educate pt/family on patient safety including physical limitations  - Instruct pt to call for assistance with activity based on assessment  - Modify environment to reduce risk of injury  - Provide assistive devices as appropriate  - Consider OT/PT consult to assist with strengthening/mobility  - Encourage toileting schedule  Outcome: Progressing     Problem: DISCHARGE  PLANNING  Goal: Discharge to home or other facility with appropriate resources  Description: INTERVENTIONS:  - Identify barriers to discharge w/pt and caregiver  - Include patient/family/discharge partner in discharge planning  - Arrange for needed discharge resources and transportation as appropriate  - Identify discharge learning needs (meds, wound care, etc)  - Arrange for interpreters to assist at discharge as needed  - Consider post-discharge preferences of patient/family/discharge partner  - Complete POLST form as appropriate  - Assess patient's ability to be responsible for managing their own health  - Refer to Case Management Department for coordinating discharge planning if the patient needs post-hospital services based on physician/LIP order or complex needs related to functional status, cognitive ability or social support system  Outcome: Progressing     Problem: CARDIOVASCULAR - ADULT  Goal: Maintains optimal cardiac output and hemodynamic stability  Description: INTERVENTIONS:  - Monitor vital signs, rhythm, and trends  - Monitor for bleeding, hypotension and signs of decreased cardiac output  - Evaluate effectiveness of vasoactive medications to optimize hemodynamic stability  - Monitor arterial and/or venous puncture sites for bleeding and/or hematoma  - Assess quality of pulses, skin color and temperature  - Assess for signs of decreased coronary artery perfusion - ex. Angina  - Evaluate fluid balance, assess for edema, trend weights  Outcome: Progressing  Goal: Absence of cardiac arrhythmias or at baseline  Description: INTERVENTIONS:  - Continuous cardiac monitoring, monitor vital signs, obtain 12 lead EKG if indicated  - Evaluate effectiveness of antiarrhythmic and heart rate control medications as ordered  - Initiate emergency measures for life threatening arrhythmias  - Monitor electrolytes and administer replacement therapy as ordered  Outcome: Progressing     Problem: GASTROINTESTINAL -  ADULT  Goal: Minimal or absence of nausea and vomiting  Description: INTERVENTIONS:  - Maintain adequate hydration with IV or PO as ordered and tolerated  - Nasogastric tube to low intermittent suction as ordered  - Evaluate effectiveness of ordered antiemetic medications  - Provide nonpharmacologic comfort measures as appropriate  - Advance diet as tolerated, if ordered  - Obtain nutritional consult as needed  - Evaluate fluid balance  Outcome: Progressing  Goal: Maintains or returns to baseline bowel function  Description: INTERVENTIONS:  - Assess bowel function  - Maintain adequate hydration with IV or PO as ordered and tolerated  - Evaluate effectiveness of GI medications  - Encourage mobilization and activity  - Obtain nutritional consult as needed  - Establish a toileting routine/schedule  - Consider collaborating with pharmacy to review patient's medication profile  Outcome: Progressing     Problem: SKIN/TISSUE INTEGRITY - ADULT  Goal: Skin integrity remains intact  Description: INTERVENTIONS  - Assess and document risk factors for pressure ulcer development  - Assess and document skin integrity  - Monitor for areas of redness and/or skin breakdown  - Initiate interventions, skin care algorithm/standards of care as needed  Outcome: Progressing      Monitoring vital signs- stable at this time. No acute changes noted at this moment. Safety and fall precautions maintained- bed alarm on, bed locked in lowest position, call light within reach. Frequent rounding by nursing staff.

## 2024-05-23 NOTE — PHYSICAL THERAPY NOTE
PHYSICAL THERAPY TREATMENT NOTE - INPATIENT     Room Number: 559/559-A       Presenting Problem: SBO with dehydration and weakness.  Sx team on consult--NG tube on wall suction in place .    PMH sign for prior ABD sx , HTN , OA , CKD , PE , PSVT  Co-Morbidities : Hx HTN, CKD3, OA    Problem List  Principal Problem:    SBO (small bowel obstruction) (HCC)  Active Problems:    Small bowel obstruction (HCC)    Rectal bleed    Rectal bleeding      PHYSICAL THERAPY ASSESSMENT   Patient demonstrates good  progress this session, goals  remain in progress.    Patient continues to function below baseline with bed mobility, transfers, gait, stair negotiation, standing prolonged periods, and performing household tasks.  Contributing factors to remaining limitations include decreased functional strength, decreased endurance/aerobic capacity, impaired dynamic standing balance, and decreased muscular endurance.  Next session anticipate patient to progress bed mobility, transfers, gait, stair negotiation, standing prolonged periods, and performing household tasks.  Physical Therapy will continue to follow patient for duration of hospitalization.    Patient continues to benefit from continued skilled PT services: at discharge to promote functional independence and safety with additional support and return home with home health PT.    PLAN  PT Treatment Plan: Bed mobility;Body mechanics;Endurance;Energy conservation;Patient education;Family education;Gait training;Balance training;Transfer training;Stoop training  Frequency (Obs): 5x/week    SUBJECTIVE  Agreeable     OBJECTIVE  Precautions: Bed/chair alarm    PAIN ASSESSMENT   Ratin  Location: no complaints of pain  Management Techniques: Activity promotion    BALANCE  Static Sitting: Good  Dynamic Sitting: Fair +  Static Standing: Fair  Dynamic Standing: Fair -    ACTIVITY TOLERANCE  Pulse: 84 (with activity)  Heart Rate Source: Monitor     BP: 152/68  BP Location: Right arm  BP  Method: Automatic  Patient Position: Sitting     O2 WALK  Oxygen Therapy  SPO2% Ambulation on Room Air: 97    AM-PAC '6-Clicks' INPATIENT SHORT FORM - BASIC MOBILITY  How much difficulty does the patient currently have...  Patient Difficulty: Turning over in bed (including adjusting bedclothes, sheets and blankets)?: A Little   Patient Difficulty: Sitting down on and standing up from a chair with arms (e.g., wheelchair, bedside commode, etc.): A Little   Patient Difficulty: Moving from lying on back to sitting on the side of the bed?: A Little   How much help from another person does the patient currently need...   Help from Another: Moving to and from a bed to a chair (including a wheelchair)?: A Little   Help from Another: Need to walk in hospital room?: A Little   Help from Another: Climbing 3-5 steps with a railing?: A Little     AM-PAC Score:  Raw Score: 18   Approx Degree of Impairment: 46.58%   Standardized Score (AM-PAC Scale): 43.63   CMS Modifier (G-Code): CK    FUNCTIONAL ABILITY STATUS  Functional Mobility/Gait Assessment  Gait Assistance:  (Stand by assistance)  Distance (ft): 300 ft  Assistive Device: Rolling walker  Pattern: Shuffle (decreased bill speed, decreased step length, flexed posture)  Stairs: Other (comment) (discussed stoop navigation and pt will have assist from daughter, can verbalize correct technique for going up/down stoop step)  Supine to Sit: stand-by assist  Sit to Stand: stand-by assist    Additional information: Patient tolerating slightly increased activity and ambulation distance this session. Re-enforced education on energy conservation, activity pacing, pursed lip breathing, and taking intermittent rest breaks.     The patient's Approx Degree of Impairment: 46.58% has been calculated based on documentation in the [facility] '6 clicks' Inpatient Daily Activity Short Form.  Research supports that patients with this level of impairment may benefit from HHPT.  Final disposition will  be made by interdisciplinary medical team.    Patient in bed upon arrival. RN approved activity. Educated patient on POC and benefits of mobilization. Agreeable to participate. Patient reporting no pain.  Patient End of Session: Needs met;Call light within reach;RN aware of session/findings;All patient questions and concerns addressed;Up in chair    CURRENT GOALS   Goals to be met by: 5/31/24  Patient Goal Patient's self-stated goal is: home    Goal #1 Patient is able to demonstrate supine - sit EOB @ level: modified independent      Goal #1   Current Status SBA   Goal #2 Patient is able to demonstrate transfers EOB to/from Chair/Wheelchair at assistance level: modified independent with walker - rolling      Goal #2  Current Status  MET 5/21 from chair, bed to RW   Goal #3 Patient is able to ambulate 300  feet with assist device: walker - rolling at assistance level: modified independent   Goal #3   Current Status   ft RW   Goal #4 Patient will negotiate 1 stairs/one curb w/ assistive device and supervision   Goal #4   Current Status  NT   Goal #5 Patient to demonstrate independence with home activity/exercise instructions provided to patient in preparation for discharge.   Goal #5   Current Status  In progress     Gait Training: 15 minutes  Therapeutic Activity: 8 minutes

## 2024-05-24 ENCOUNTER — PATIENT OUTREACH (OUTPATIENT)
Dept: CASE MANAGEMENT | Age: 89
End: 2024-05-24

## 2024-05-24 ENCOUNTER — TELEPHONE (OUTPATIENT)
Dept: INTERNAL MEDICINE CLINIC | Facility: CLINIC | Age: 89
End: 2024-05-24

## 2024-05-24 DIAGNOSIS — Z02.9 ENCOUNTERS FOR UNSPECIFIED ADMINISTRATIVE PURPOSE: ICD-10-CM

## 2024-05-24 DIAGNOSIS — K56.609 SBO (SMALL BOWEL OBSTRUCTION) (HCC): Primary | ICD-10-CM

## 2024-05-24 PROCEDURE — 1111F DSCHRG MED/CURRENT MED MERGE: CPT

## 2024-05-24 NOTE — PROGRESS NOTES
Initial Post Discharge Follow Up   Discharge Date: 5/23/24  Contact Date: 5/24/2024    Consent Verification:  Assessment Completed With: Patient  HIPAA Verified?  Yes    Discharge Dx:   SBO    General:   How have you been since your discharge from the hospital? Pt feeling better, since hospital discharge--mild, intermittent abdominal pain 3/10, managed well at home with Tylenol, as needed. Pt tolerating diet, staying hydrated, ambulating with walker at home. Pt denies fever, chills, headache, vision changes, dizziness, nausea, vomiting, diarrhea, rectal bleeding, melena, bleeding, intractable abdominal pain, chest pain or shortness of breath at this time. Pt passing flatus, taking Colace and Miralax, as prescribed.  Do you have any pain since discharge?  Yes  Where: abdominal   Rating on pain scale 1-10, 10 being the worst pain you have ever experienced, what is current pain: 3  Alleviating factors: rest, Tylenol  Aggravating factors: positional  Is the pain manageable at home? Yes  How well was your pain managed while in the hospital?   On a scale of 1-5   1- Very Poor and 5- Very well   Very Well  When you were leaving the hospital were your discharge instructions reviewed with you? Yes  How well were your discharge instructions explained to you?   On a scale of 1-5   1- Very Poor and 5- Very well   Very Well  Do you have any questions about your discharge instructions?  No  Before leaving the hospital was your diagnoses explained to you? Yes  Do you have any questions about your diagnoses? No  Are you able to perform normal daily activities of living as you have prior to your hospital stay (dressing, bathing, ambulating to the bathroom, etc)? yes, with walker  ([place]) Was patient given a different diet per AVS? no    Medications:   Current Outpatient Medications   Medication Sig Dispense Refill    docusate sodium 100 MG Oral Cap Take 100 mg by mouth daily. 30 capsule 0    spironolactone 25 MG Oral Tab Take 1 tablet  (25 mg total) by mouth daily.      acetaminophen 650 MG Rectal Suppos Place 1 suppository (650 mg total) rectally every 6 (six) hours as needed for Pain or Fever.      polyethylene glycol, PEG 3350, 17 g Oral Powd Pack Take 17 g by mouth daily as needed (constipation).      furosemide 40 MG Oral Tab Take 1 tablet (40 mg total) by mouth daily.      PANTOPRAZOLE 40 MG Oral Tab EC TAKE 1 TABLET(40 MG) BY MOUTH BEFORE BREAKFAST 90 tablet 0    apixaban 2.5 MG Oral Tab Take 1 tablet (2.5 mg total) by mouth 2 (two) times daily. 180 tablet 3    benzonatate 200 MG Oral Cap Take 1 capsule (200 mg total) by mouth 3 (three) times daily as needed for cough. 20 capsule 0    albuterol 108 (90 Base) MCG/ACT Inhalation Aero Soln Inhale 2 puffs into the lungs every 4 (four) hours as needed for Wheezing or Shortness of Breath. 1 each 0    alendronate 70 MG Oral Tab Take 1 tablet (70 mg total) by mouth every 7 days. 4 tablet 11    DILTIAZEM  MG Oral Capsule SR 24 Hr TAKE 1 CAPSULE(240 MG) BY MOUTH DAILY 90 capsule 1    Cranberry 500 MG Oral Cap Take by mouth.       Were there any changes to your current medication(s) noted on the AVS? Yes  START taking:  acetaminophen (Tylenol)  docusate sodium (COLACE)  Start taking on: May 24, 2024  CHANGE how you take:  apixaban (Eliquis)  furosemide (Lasix)  STOP taking:  ciprofloxacin 250 MG Tabs (Cipro)  metoprolol succinate ER 50 MG Tb24 (Toprol XL)  If so, were these medication changes discussed with you prior to leaving the hospital? Yes  If a new medication was prescribed:    Was the new medication's purpose & side effects reviewed? Yes  Do you have any questions about your new medication? No  Did you  your discharge medications when you left the hospital? Yes  Let's go over your medications together to make sure we are not missing anything. Medications Reviewed  Are there any reasons that keep you from taking your medication as prescribed? No  Are you having any concerns with  constipation? No  Did patient receive their flu shot (Sept-March)? No    Discharge medications reviewed/discussed/and reconciled against outpatient medications with patient.  Any changes or updates to medications sent to PCP.  Patient Acknowledged     Referrals/orders at D/C:  Referrals/orders placed at D/C? yes  What services:   Home health--RN, PT/OT   (If HH was ordered) Has HH been set up?  Yes    If Yes: With Whom: [facility] [phone]  DME ordered at D/C? No    Discharge orders, AVS reviewed and discussed with patient. Any changes or updates to orders sent to PCP.  Patient Acknowledged      SDOH:   Transportation:   Transportation Needs: No Transportation Needs (5/24/2024)    Transportation Needs     Lack of Transportation: No     Car Seat: Not on file     Financial Strain:    Financial Resource Strain: Low Risk  (5/24/2024)    Financial Resource Strain     Difficulty of Paying Living Expenses: Not very hard     Med Affordability: No     Follow up appointments:    Follow-up Information    Follow up With Specialties Details Why Contact Info   Enid Segal MD Internal Medicine Schedule an appointment as soon as possible for a visit in 1 week(s) post hospitalization follow up [address] [place] [id number]  [phone]     Your appointments       Date & Time Appointment Department ([place])    May 30, 2024 1:30 PM T Hospital Follow Up with Enid Segal MD [facility] ([address])        Sep 07, 2024 11:30 AM CDT Medicare Annual Health Assessment with Enid Segal MD [facility] ([address])        Jan 30, 2025 1:00 PM CST HEMATOLOGY ONCOLOGY FOLLOW UP with Cam Munguia MD [facility] ([facility])              [facility]ology [facility]cology  [place] [address] [address]  [facility] [address]  [phone] [facility], [facility], [facility] at [address] [place] [address] [address] [facility] [id number] [phone]            TCC  Was TCC ordered: No    PCP (If no TCC appointment)  Does patient already have a PCP appointment scheduled? Yes  NCM Confirmed PCP office TCM appointment with patient--5/30/2024     Specialist    Does the patient have any other follow up appointment(s) needing to be scheduled? No    Is there any reason as to why you cannot make your appointment(s)?  No     Needs post D/C:   Now that you are home, are there any needs or concerns you need addressed before your next visit with your PCP?  (DME, meds, questions, etc.): No    Interventions by NCM:   Discussed diet, activity, medications and need for f/u visits. Pt confirms [facility] RN scheduled to see her today, around 11 a.m., for start of care. Pt also confirms 5/30/2024 TCM appt with Dr. Segal.  Patient aware when to contact PCP/specialists and when to seek emergency care. No further questions/concerns at this time.    Overall Rating:   How would you rate the care you received while in the hospital? excellent    CCM referral placed:    No    BOOK BY DATE: 6/06/2024

## 2024-05-24 NOTE — TELEPHONE ENCOUNTER
Shirley from [facility] called.  Home health care orders were opened for this patient today.    No call back is needed.

## 2024-05-29 NOTE — DISCHARGE SUMMARY
[facility]OSPITAL[facility]  DISCHARGE SUMMARY     Rosaura Hermosillo Patient Status:  Inpatient    1933 MRN [id number]   Location [facility] 5SW/SE Attending No att. providers found   Hosp Day # 8 PCP Enid Segal MD     Date of Admission: 5/15/2024  Date of Discharge: 2024  Discharge Disposition: Home Health Care Services    Admitting Chief Complaint:   SBO (small bowel obstruction) (formerly [facility]) [K56.014]    PCP: Enid Segal MD    Discharge Diagnosis:   Small bowel obstruction.        Rectal bleed. Suspect hemorrhoids       Chronic constipation     Dehydration.  resolved     Recent UTI.       Other problems  Hypertension  CHF, chronic  Dyslipidemia  Osteoarthritis  CKD stage III  History of PE   PSVT   Obesity BMI 34  GERD  Hiatal hernia             History of Present Illness:   Per Dr. Foote  Patient is a 89-year-old  female who presented to the emergency department for evaluation of abdominal pain, dry heaving, and acid reflux symptoms since yesterday.  CBC and chemistry are unremarkable.  GFR is slightly below her baseline at 27.  Liver function tests and lipase were negative.  CT scan of the abdomen shows small-bowel obstruction with transition point in the lower central abdomen, etiology indeterminate.  Appendix unremarkable.  Diverticulosis without complications.  Patient will be admitted to the hospital for further management.           Brief Synopsis:   Small bowel obstruction.  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    Improved with conservative management. Seen by surgery as well.  Pain resolved.  Passing some flatus, and now also stool.    Diet advanced, tolerated well  -Encourage activity as tolerated  -recheck abd XR for f/u (pt is very anxious about recurrent symptoms ) with improvement.      Rectal bleed. Suspect hemorrhoids though can't r/o other more serious causes.    -GI consult appreciated, No plans for endoscopy unless overt large bleed  -cont  eliquis  -small amount noticed after wiping  -pt encouraged not to strain, added stool softeners  -she should f/u GI outpatient      Chronic constipation  Pt states it is not unusual not to have BM for 5 days  -we discussed diet, hydration, med use as she needs a good bowel regimen     Dehydration.  resolved     Recent UTI.  Repeat cxs neg x2     Other problems  Hypertension, restarted diltiazem, continue to hold metoprolol because of bradycardia  CHF, on lasix bid prior admission, now on hold-->restart ?daily, follows at HF clinic  Dyslipidemia  Osteoarthritis  CKD stage III  History of PE, Eliquis  PSVT, restarted diltiazem  Obesity BMI 34  GERD  Hiatal hernia     Social: lives by herself, cooks, cleans and doing laundry. Supportive children nearby     dvt prophylaxis: eliquis  code status: full code  dispo: Home            Discharge Medication List:     Discharge Medications        START taking these medications        Instructions Prescription details   acetaminophen 650 MG Supp  Commonly known as: Tylenol      Place 1 suppository (650 mg total) rectally every 6 (six) hours as needed for Pain or Fever.   Refills: 0     docusate sodium 100 MG Caps  Commonly known as: COLACE      Take 100 mg by mouth daily.   Quantity: 30 capsule  Refills: 0            CHANGE how you take these medications        Instructions Prescription details   apixaban 2.5 MG Tabs  Commonly known as: Eliquis  What changed: Another medication with the same name was removed. Continue taking this medication, and follow the directions you see here.      Take 1 tablet (2.5 mg total) by mouth 2 (two) times daily.   Quantity: 180 tablet  Refills: 3     furosemide 40 MG Tabs  Commonly known as: Lasix  What changed: when to take this      Take 1 tablet (40 mg total) by mouth daily.   Refills: 0            CONTINUE taking these medications        Instructions Prescription details   albuterol 108 (90 Base) MCG/ACT Aers  Commonly known as: Ventolin HFA       Inhale 2 puffs into the lungs every 4 (four) hours as needed for Wheezing or Shortness of Breath.   Quantity: 1 each  Refills: 0     alendronate 70 MG Tabs  Commonly known as: Fosamax      Take 1 tablet (70 mg total) by mouth every 7 days.   Quantity: 4 tablet  Refills: 11     benzonatate 200 MG Caps  Commonly known as: Tessalon      Take 1 capsule (200 mg total) by mouth 3 (three) times daily as needed for cough.   Quantity: 20 capsule  Refills: 0     Cranberry 500 MG Caps      Take by mouth.   Refills: 0     dilTIAZem  MG Cp24  Commonly known as: CardIZEM CD      TAKE 1 CAPSULE(240 MG) BY MOUTH DAILY   Quantity: 90 capsule  Refills: 1     pantoprazole 40 MG Tbec  Commonly known as: Protonix      TAKE 1 TABLET(40 MG) BY MOUTH BEFORE BREAKFAST   Quantity: 90 tablet  Refills: 0     polyethylene glycol (PEG 3350) 17 g Pack  Commonly known as: Miralax      Take 17 g by mouth daily as needed (constipation).   Refills: 0     spironolactone 25 MG Tabs  Commonly known as: Aldactone      Take 1 tablet (25 mg total) by mouth daily.   Refills: 0            STOP taking these medications      ciprofloxacin 250 MG Tabs  Commonly known as: Cipro        metoprolol succinate ER 50 MG Tb24  Commonly known as: Toprol XL                  Where to Get Your Medications        These medications were sent to [id number] - [facility] [address] AT [address], [phone], [phone] [address] [facility] [address]      Phone: [phone]   docusate sodium 100 MG Caps         Follow-up appointment:   Enid Segal MD  [address] [place] [address] [phone]    Schedule an appointment as soon as possible for a visit in 1 week(s)  post hospitalization follow up      Vital signs:       Physical Exam:    General: No acute distress. Feels ready for home  Respiratory: Clear to auscultation bilaterally. No wheezes. No rhonchi.  Cardiovascular: S1, S2. Regular rate and  rhythm. No murmurs, rubs or gallops.   Abdomen: Soft, nontender, nondistended.  Positive bowel sounds. No rebound or guarding.  Neurologic: No new focal neurological deficits.   Musculoskeletal: Moves all extremities.  Extremities: No edema.  -----------------------------------------------------------------------------------------------  PATIENT DISCHARGE INSTRUCTIONS: See electronic chart    I d/w pt the available results, management plan, medications changes, and discharge instructions including follow ups as outlined above.   Scripts sent to pharmacy.      Hospital Discharge Diagnoses:  SBO    Lace+ Score: 81  59-90 High Risk  29-58 Medium Risk  0-28   Low Risk.    TCM Follow-Up Recommendation:  LACE > 58: High Risk of readmission after discharge from the hospital.        RIVERA GONZALEZ MD 5/28/2024    Time spent:  > 30 minutes     17 year old with no sig PMH here with 17 days of fever. Symptoms started on July 1 with a fever (Tmax 103F), nausea, loss of appetite, diarrhea, shivers that lasted for four days. Developed swelling and pain in  right inguinal lymph node; US revealed irregualry shaped hypoechoic lymp node 4.8 x 1.1 x 1.9. Duplex studies neg. At OSH on 7/7, lab work largely unremarkable: CBC/CMP wnl, Procal 0.18. Hep panel neg, EBV studies neg, Anaplasma/Erlichia/Lyme studies neg, GC/Chl/HIV studies neg. BCx, Throat Cx ng. CXR neg. +Has lost 10 lbs due to poor appetite, has night sweats, been more fatigued. Temperatures have been decreasing (100-101) but right inguinal swelling has increased. IUTD. No recent travel. No sexual activity. No animal exposure. At this time, etiology of fever is unclear. Remy repeat CBC, CMP. Procal, ESR, CRP, US lymph node. Will add on CMV studies. Will consult ID, Onc now. Consider rheum if admitted.  -[place] PGY2

## 2024-05-30 ENCOUNTER — OFFICE VISIT (OUTPATIENT)
Dept: INTERNAL MEDICINE CLINIC | Facility: CLINIC | Age: 89
End: 2024-05-30

## 2024-05-30 ENCOUNTER — LAB ENCOUNTER (OUTPATIENT)
Dept: LAB | Facility: HOSPITAL | Age: 89
End: 2024-05-30
Attending: INTERNAL MEDICINE
Payer: MEDICARE

## 2024-05-30 VITALS
SYSTOLIC BLOOD PRESSURE: 130 MMHG | HEART RATE: 69 BPM | OXYGEN SATURATION: 97 % | HEIGHT: 63 IN | BODY MASS INDEX: 36.14 KG/M2 | DIASTOLIC BLOOD PRESSURE: 70 MMHG | WEIGHT: 204 LBS

## 2024-05-30 DIAGNOSIS — Z87.19 HISTORY OF SMALL BOWEL OBSTRUCTION: ICD-10-CM

## 2024-05-30 DIAGNOSIS — I50.32 CHRONIC DIASTOLIC CONGESTIVE HEART FAILURE (HCC): ICD-10-CM

## 2024-05-30 DIAGNOSIS — R30.0 DYSURIA: ICD-10-CM

## 2024-05-30 DIAGNOSIS — Z92.89 HISTORY OF RECENT HOSPITALIZATION: Primary | ICD-10-CM

## 2024-05-30 PROCEDURE — 87077 CULTURE AEROBIC IDENTIFY: CPT

## 2024-05-30 PROCEDURE — 87086 URINE CULTURE/COLONY COUNT: CPT

## 2024-05-30 PROCEDURE — 99214 OFFICE O/P EST MOD 30 MIN: CPT | Performed by: INTERNAL MEDICINE

## 2024-05-30 NOTE — PROGRESS NOTES
Subjective:   Rosaura Hermosillo is a 89 year old female who presents for Hospital F/U     Patient here for a post hospitalization for a partial SBo which resolved with conservative management.  Has had BMs since dc and has minimal cramps .   Apetite is very good. Passing flatus normally.  History/Other:    Chief Complaint Reviewed and Verified  No Further Nursing Notes to   Review  Medications Reviewed  Problem List Reviewed         Tobacco:  She smoked tobacco in the past but quit greater than 12 months ago.  Social History     Tobacco Use   Smoking Status Former    Current packs/day: 0.50    Types: Cigarettes   Smokeless Tobacco Never   Tobacco Comments    smoked for 2 months only when she was 16 years old        Current Outpatient Medications   Medication Sig Dispense Refill    docusate sodium 100 MG Oral Cap Take 100 mg by mouth daily. 30 capsule 0    spironolactone 25 MG Oral Tab Take 1 tablet (25 mg total) by mouth daily.      acetaminophen 650 MG Rectal Suppos Place 1 suppository (650 mg total) rectally every 6 (six) hours as needed for Pain or Fever. (Patient not taking: Reported on 5/30/2024)      polyethylene glycol, PEG 3350, 17 g Oral Powd Pack Take 17 g by mouth daily as needed (constipation).      furosemide 40 MG Oral Tab Take 1 tablet (40 mg total) by mouth daily.      PANTOPRAZOLE 40 MG Oral Tab EC TAKE 1 TABLET(40 MG) BY MOUTH BEFORE BREAKFAST 90 tablet 0    apixaban 2.5 MG Oral Tab Take 1 tablet (2.5 mg total) by mouth 2 (two) times daily. 180 tablet 3    benzonatate 200 MG Oral Cap Take 1 capsule (200 mg total) by mouth 3 (three) times daily as needed for cough. (Patient not taking: Reported on 5/30/2024) 20 capsule 0    albuterol 108 (90 Base) MCG/ACT Inhalation Aero Soln Inhale 2 puffs into the lungs every 4 (four) hours as needed for Wheezing or Shortness of Breath. 1 each 0    alendronate 70 MG Oral Tab Take 1 tablet (70 mg total) by mouth every 7 days. 4 tablet 11    DILTIAZEM  MG  Oral Capsule SR 24 Hr TAKE 1 CAPSULE(240 MG) BY MOUTH DAILY 90 capsule 1    Cranberry 500 MG Oral Cap Take by mouth.           Review of Systems:  Review of Systems   Respiratory:  Negative for shortness of breath.    Cardiovascular:  Positive for leg swelling. Negative for chest pain and palpitations.   Gastrointestinal:  Negative for abdominal pain and nausea.   Genitourinary:  Positive for dysuria and frequency.   Psychiatric/Behavioral:  Negative for confusion.          Objective:   /70   Pulse 69   Ht 5' 3\" (1.6 m)   Wt 204 lb (92.5 kg)   SpO2 97%   BMI 36.14 kg/m²  Estimated body mass index is 36.14 kg/m² as calculated from the following:    Height as of this encounter: 5' 3\" (1.6 m).    Weight as of this encounter: 204 lb (92.5 kg).  Physical Exam  Constitutional:       Appearance: She is obese.   HENT:      Head: Normocephalic and atraumatic.   Eyes:      General: No scleral icterus.     Pupils: Pupils are equal, round, and reactive to light.   Cardiovascular:      Rate and Rhythm: Normal rate and regular rhythm.      Heart sounds:      No gallop.   Pulmonary:      Effort: Pulmonary effort is normal.      Breath sounds: Normal breath sounds. No rales.   Abdominal:      Palpations: Abdomen is soft.      Tenderness: There is no abdominal tenderness.      Hernia: A hernia (midline venral hernia) is present.      Comments: Bowel sounds audible     Musculoskeletal:      Cervical back: Neck supple.      Right lower leg: Edema (2 plus) present.      Left lower leg: Edema (2 plus) present.   Lymphadenopathy:      Cervical: No cervical adenopathy.   Skin:     Findings: No lesion.   Neurological:      Mental Status: She is alert. Mental status is at baseline.   Psychiatric:         Thought Content: Thought content normal.           Assessment & Plan:   1. History of recent hospitalization (Primary) doing well clinically  2. Chronic diastolic congestive heart failure (HCC) compensated with Lasix  3. Dysuria  recheck urine culture  4. History of small bowel obstruction resolved with NG suction        No follow-ups on file.    Enid Segal MD, 5/30/2024, 1:44 PM

## 2024-06-21 ENCOUNTER — TELEPHONE (OUTPATIENT)
Dept: INTERNAL MEDICINE CLINIC | Facility: CLINIC | Age: 89
End: 2024-06-21

## 2024-06-21 NOTE — TELEPHONE ENCOUNTER
Raeann from [facility] called to informed  that patient LABS are moved to Monday.

## 2024-06-26 ENCOUNTER — LAB REQUISITION (OUTPATIENT)
Dept: LAB | Facility: HOSPITAL | Age: 89
End: 2024-06-26
Payer: MEDICARE

## 2024-06-26 DIAGNOSIS — I50.30 UNSPECIFIED DIASTOLIC (CONGESTIVE) HEART FAILURE (HCC): ICD-10-CM

## 2024-06-26 LAB
ANION GAP SERPL CALC-SCNC: 7 MMOL/L (ref 0–18)
BNP SERPL-MCNC: 92 PG/ML
BUN BLD-MCNC: 23 MG/DL (ref 9–23)
BUN/CREAT SERPL: 17.2 (ref 10–20)
CALCIUM BLD-MCNC: 9.7 MG/DL (ref 8.7–10.4)
CHLORIDE SERPL-SCNC: 108 MMOL/L (ref 98–112)
CO2 SERPL-SCNC: 28 MMOL/L (ref 21–32)
CREAT BLD-MCNC: 1.34 MG/DL
EGFRCR SERPLBLD CKD-EPI 2021: 38 ML/MIN/1.73M2 (ref 60–?)
GLUCOSE BLD-MCNC: 98 MG/DL (ref 70–99)
OSMOLALITY SERPL CALC.SUM OF ELEC: 300 MOSM/KG (ref 275–295)
POTASSIUM SERPL-SCNC: 3.4 MMOL/L (ref 3.5–5.1)
SODIUM SERPL-SCNC: 143 MMOL/L (ref 136–145)

## 2024-06-26 PROCEDURE — 83880 ASSAY OF NATRIURETIC PEPTIDE: CPT | Performed by: INTERNAL MEDICINE

## 2024-06-26 PROCEDURE — 80048 BASIC METABOLIC PNL TOTAL CA: CPT | Performed by: INTERNAL MEDICINE

## 2024-07-09 ENCOUNTER — TELEPHONE (OUTPATIENT)
Dept: INTERNAL MEDICINE CLINIC | Facility: CLINIC | Age: 89
End: 2024-07-09

## 2024-07-09 DIAGNOSIS — N23 URINARY TRACT PAIN: Primary | ICD-10-CM

## 2024-07-09 NOTE — TELEPHONE ENCOUNTER
Amber from [facility] spoke to this patient this morning.    The patient has a rash under her breast.  Amber is asking if Dr. Segal could please send a powder for the rash.    And the patient is experiencing burning and pressure when urinating.  Amber is asking if Dr. Segal and and enter a UA to be done with them.     She will be visiting the patient tomorrow.    Please call Amber back at [phone].

## 2024-07-09 NOTE — TELEPHONE ENCOUNTER
Call returned to  [facility] nurse/Amber to request more details on pt's condition and her orders ask.  Nurse asks for   MD order to conduct a HH visit this week to assess pt.   2.   Order to collect urine for UA/ C&S given pt         complaints of bladder pressure, burning         sensation in urination x several days and  3. Rx for a skin rash under patient's breast.    Per  nurse she can provide no further details (rash appearance/ color, presence of fever/ flank/ abd pain)  as she has not yet seen pt-- these are complaints verbalized by patient via phone call to nurse. Plan is to see pt tomorrow.    Encounter notes routed to Dr Segal for review. Verbal order given for nurse visit, UA C&S specimen collection.  Nurse to assess pt and call the office back, potentially send digital image of breast/ chest rash via [employer] if possible.  _________________  Second call to  nurse Amber- confirming UA Culture order and informing that Dr Segal has ordered a topical cream for the rash, Lotrisone. Sent Rx to [facility] for pt. She verbalizes understanding

## 2024-07-09 NOTE — TELEPHONE ENCOUNTER
Amber from [facility] spoke to this patient this morning.    The patient has a rash under her breast.  Amber is asking if Dr. Segal could please send a powder for the rash.    And the patient is experiencing burning and pressure when urinating.  Amber is asking if Dr. Segal and and enter a UA to be done with them.     She will be visiting the patient tomorrow.    Please call Amber back at [phone].

## 2024-07-11 ENCOUNTER — TELEPHONE (OUTPATIENT)
Dept: INTERNAL MEDICINE CLINIC | Facility: CLINIC | Age: 89
End: 2024-07-11

## 2024-07-11 NOTE — TELEPHONE ENCOUNTER
Rash    The rash is just caused from sweat under her breasts, does not look bad at all. She does not wear a bra, so that area gets very moist, rash is under both breasts.    She would like to do a re certification for this patient. Will need a call back for the re certification    Pt could not provide a urine sample, so phlebotomy is going out tomorrow, the patient has the tools to get everything done, so the phlebotomist will have the urine sample out around 10:30 AM tomorrow.

## 2024-07-18 ENCOUNTER — LAB REQUISITION (OUTPATIENT)
Dept: LAB | Age: 89
End: 2024-07-18
Payer: MEDICARE

## 2024-07-18 DIAGNOSIS — K56.690 OTHER PARTIAL INTESTINAL OBSTRUCTION (HCC): ICD-10-CM

## 2024-07-18 LAB
BILIRUB UR QL STRIP.AUTO: NEGATIVE
COLOR UR AUTO: YELLOW
GLUCOSE UR STRIP.AUTO-MCNC: NORMAL MG/DL
KETONES UR STRIP.AUTO-MCNC: NEGATIVE MG/DL
LEUKOCYTE ESTERASE UR QL STRIP.AUTO: 500
NITRITE UR QL STRIP.AUTO: NEGATIVE
PH UR STRIP.AUTO: 6 [PH] (ref 5–8)
RBC #/AREA URNS AUTO: >10 /HPF
SP GR UR STRIP.AUTO: 1.01 (ref 1–1.03)
UROBILINOGEN UR STRIP.AUTO-MCNC: NORMAL MG/DL
WBC #/AREA URNS AUTO: >50 /HPF
WBC CLUMPS UR QL AUTO: PRESENT /HPF

## 2024-07-18 PROCEDURE — 81001 URINALYSIS AUTO W/SCOPE: CPT | Performed by: INTERNAL MEDICINE

## 2024-07-18 PROCEDURE — 87086 URINE CULTURE/COLONY COUNT: CPT | Performed by: INTERNAL MEDICINE

## 2024-08-05 ENCOUNTER — TELEPHONE (OUTPATIENT)
Dept: INTERNAL MEDICINE CLINIC | Facility: CLINIC | Age: 89
End: 2024-08-05

## 2024-08-05 NOTE — TELEPHONE ENCOUNTER
Amber, a nurse from [facility] called the office.    She would like to move the patient's nurse visit from 8/12 to 8/19 due to a change in her schedule.    She is asking for a verbal okay for this appointment to be moved.    Amber's confidential phone line is [phone].

## 2024-08-12 ENCOUNTER — OFFICE VISIT (OUTPATIENT)
Dept: INTERNAL MEDICINE CLINIC | Facility: CLINIC | Age: 89
End: 2024-08-12
Payer: MEDICARE

## 2024-08-12 VITALS
HEART RATE: 66 BPM | HEIGHT: 63 IN | WEIGHT: 200.19 LBS | DIASTOLIC BLOOD PRESSURE: 70 MMHG | SYSTOLIC BLOOD PRESSURE: 134 MMHG | OXYGEN SATURATION: 97 % | BODY MASS INDEX: 35.47 KG/M2

## 2024-08-12 DIAGNOSIS — R60.0 EDEMA, PERIPHERAL: ICD-10-CM

## 2024-08-12 DIAGNOSIS — I26.99 PULMONARY EMBOLISM, OTHER, UNSPECIFIED CHRONICITY, UNSPECIFIED WHETHER ACUTE COR PULMONALE PRESENT (HCC): ICD-10-CM

## 2024-08-12 DIAGNOSIS — I50.32 CHRONIC DIASTOLIC CONGESTIVE HEART FAILURE (HCC): ICD-10-CM

## 2024-08-12 DIAGNOSIS — Z00.00 MEDICARE ANNUAL WELLNESS VISIT, SUBSEQUENT: Primary | ICD-10-CM

## 2024-08-12 DIAGNOSIS — I87.2 VENOUS INSUFFICIENCY (CHRONIC) (PERIPHERAL): ICD-10-CM

## 2024-08-12 DIAGNOSIS — Z12.31 SCREENING MAMMOGRAM, ENCOUNTER FOR: ICD-10-CM

## 2024-08-12 NOTE — PROGRESS NOTES
Subjective:   Rosaura Hermosillo is a 89 year old female who presents for Physical     Patient here for a awv and fu on hx of PE recently, chf chronic , Gerd and generalized OA.  HPI:   Rosaura Hermosillo is a 89 year old female who presents for a Medicare Annual Wellness visit.    Patient Active Problem List   Diagnosis    Osteoarthrosis, unspecified whether generalized or localized, pelvic region and thigh    Degeneration of lumbar or lumbosacral intervertebral disc    Spondylolisthesis    HTN (hypertension)    Pure hypercholesterolemia    Carotid stenosis    Dyslipidemia    Paroxysmal SVT (supraventricular tachycardia) ([facility])    Bilateral carotid artery stenosis    CKD (chronic kidney disease) stage 3, GFR 30-59 ml/min (Formerly [facility])    PVCs (premature ventricular contractions)    Sensorineural hearing loss, bilateral    Chronic nonalcoholic liver disease    Enthesopathy of hip region    Gastroesophageal reflux disease    Generalized osteoarthrosis, involving multiple sites    Low back pain    Senile osteoporosis    Patient overweight    Spinal stenosis of lumbar region with neurogenic claudication    Primary osteoarthritis of both hips    Gait disturbance    Iliotibial band syndrome of left side    Urge incontinence    Nocturia    Cystocele, midline    Constipation    Abnormal urinalysis    Abdominal pain, acute    Fever in other diseases    Acute pulmonary embolism (HCC)    Acute pulmonary embolism, unspecified pulmonary embolism type, unspecified whether acute cor pulmonale present (HCC)    Shortness of breath    Chronic pulmonary embolism with acute cor pulmonale, unspecified pulmonary embolism type (HCC)    Chronic renal disease, stage IV (HCC)    Acute pulmonary embolism without acute cor pulmonale, unspecified pulmonary embolism type (HCC)    Acute bronchiolitis due to respiratory syncytial virus (RSV)    Acute on chronic diastolic CHF (congestive heart failure) (HCC)    Venous insufficiency (chronic) (peripheral)     Unspecified diastolic (congestive) heart failure (HCC)    SBO (small bowel obstruction) (HCC)    Small bowel obstruction (HCC)    Rectal bleed    Rectal bleeding       General Health     In the past six months, have you lost more than 10 pounds without trying?: 2 - No    Has your appetite been poor?: No    Type of Diet: Balanced;Low Salt    How does the patient maintain a good energy level?: Appropriate Exercise;Daily Walks    How would you describe your daily physical activity?: Light    How would you describe your current health state?: Fair    How do you maintain positive mental well-being?: Visiting Friends;Visiting Family         Have you had any immunizations at another office such as Influenza, Hepatitis B, Tetanus, or Pneumococcal?: No     Functional Ability     Bathing or Showering: Able without help    Toileting: Able without help    Dressing: Able without help    Eating: Able without help    Driving: Does not drive    Preparing your meals: Able without help    Managing money/bills: Able without help    Taking medications as prescribed: Able without help    Are you able to afford your medications?: Yes    Hearing Problems?: No     Functional Status     Hearing Problems?: No    Vision Problems? : Yes (wears glasses)    Difficulty walking?: Yes    Difficulty dressing or bathing?: No    Problems with daily activities? : Yes (cleaning house)    Memory Problems?: Yes (sometimes)      Fall/Risk Assessment                                                              Depression Screening (PHQ-2/PHQ-9): Over the LAST 2 WEEKS                      Advance Directives     Do you have a healthcare power of ?: No    Do you have a living will?: No   Was Medicare Assessment Questionnaire completed by patient and sent to HIM for scanning?Yes    Please go to \"Cognitive Assessment\" under Medicare Assessment section in Charting, test patient and document.    Then, refresh your progress note to see your input  here.  Cognitive Assessment     What day of the week is this?: Correct    What month is it?: Correct    What year is it?: Correct    Recall \"Ball\": Correct    Recall \"Flag\": Correct    Recall \"Tree\": Correct      ALLERGIES:     Allergies   Allergen Reactions    Macrobid [Nitrofurantoin] SHORTNESS OF BREATH     Macrobid was possible cause of wheezing and SOB prompting admission on 11/10/2021    Atorvastatin OTHER (SEE COMMENTS)     Double Vision    Statins OTHER (SEE COMMENTS)     Double vision       CURRENT MEDICATIONS:   Current Outpatient Medications   Medication Sig Dispense Refill    clotrimazole-betamethasone 1-0.05 % External Cream Apply 1 Application topically 2 (two) times daily as needed. 60 g 3    docusate sodium 100 MG Oral Cap Take 100 mg by mouth daily. 30 capsule 0    spironolactone 25 MG Oral Tab Take 1 tablet (25 mg total) by mouth daily.      polyethylene glycol, PEG 3350, 17 g Oral Powd Pack Take 17 g by mouth daily as needed (constipation).      furosemide 40 MG Oral Tab Take 1 tablet (40 mg total) by mouth daily.      PANTOPRAZOLE 40 MG Oral Tab EC TAKE 1 TABLET(40 MG) BY MOUTH BEFORE BREAKFAST 90 tablet 0    apixaban 2.5 MG Oral Tab Take 1 tablet (2.5 mg total) by mouth 2 (two) times daily. 180 tablet 3    albuterol 108 (90 Base) MCG/ACT Inhalation Aero Soln Inhale 2 puffs into the lungs every 4 (four) hours as needed for Wheezing or Shortness of Breath. 1 each 0    alendronate 70 MG Oral Tab Take 1 tablet (70 mg total) by mouth every 7 days. 4 tablet 11    DILTIAZEM  MG Oral Capsule SR 24 Hr TAKE 1 CAPSULE(240 MG) BY MOUTH DAILY 90 capsule 1    Cranberry 500 MG Oral Cap Take by mouth.      acetaminophen 650 MG Rectal Suppos Place 1 suppository (650 mg total) rectally every 6 (six) hours as needed for Pain or Fever. (Patient not taking: Reported on 5/30/2024)      benzonatate 200 MG Oral Cap Take 1 capsule (200 mg total) by mouth 3 (three) times daily as needed for cough. (Patient not  taking: Reported on 5/30/2024) 20 capsule 0      MEDICAL INFORMATION:   Past Medical History:    Abnormal heart rhythm    Cataract    extraction and lens implantation per NG.    Chronic pulmonary embolism (HCC)    Cystocele    repair management per. NG    Essential hypertension    Hemorrhoid    Hernia, hiatal    High cholesterol    Hyperlipidemia    S/P cholecystectomy    lap cholecystectomy per. NG      Past Surgical History:   Procedure Laterality Date    Breast biopsy Right     2011    Colonoscopy      Electrocardiogram, complete  05/09/2013    scanned to media tab    Removal gallbladder        Family History   Problem Relation Age of Onset    Heart Disorder Father 47        heart attack cause of death    Musculo-skelatal Disorder Mother         osteoporosis      SOCIAL HISTORY:   Social History     Socioeconomic History    Marital status:    Tobacco Use    Smoking status: Former     Current packs/day: 0.50     Types: Cigarettes    Smokeless tobacco: Never    Tobacco comments:     smoked for 2 months only when she was 16 years old   Vaping Use    Vaping status: Never Used   Substance and Sexual Activity    Alcohol use: Yes     Comment: Rarely wine 1 glass    Drug use: Never   Other Topics Concern    Caffeine Concern No     Social Determinants of Health     Financial Resource Strain: Low Risk  (5/24/2024)    Financial Resource Strain     Difficulty of Paying Living Expenses: Not very hard     Med Affordability: No   Food Insecurity: No Food Insecurity (5/15/2024)    Food Insecurity     Food Insecurity: Never true   Transportation Needs: No Transportation Needs (5/24/2024)    Transportation Needs     Lack of Transportation: No   Housing Stability: Low Risk  (5/15/2024)    Housing Stability     Housing Instability: No     Occ: retired : no      REVIEW OF SYSTEMS:   GENERAL: feels well otherwise  SKIN: denies any unusual skin lesions sebacous cyst on chest wall  EYES: denies blurred vision or double  vision bifocals  HEENT: denies nasal congestion, sinus pain or ST  LUNGS: denies shortness of breath with exertion  CARDIOVASCULAR: denies chest pain on exertion  GI: denies abdominal pain, controlled heartburn  : denies dysuria, vaginal discharge or itching, has urinary incontinence   MUSCULOSKELETAL:lumbar back pain hip pain  NEURO: denies headaches  PSYCHE: denies depression or anxiety no dementia  HEMATOLOGIC: denies hx of anemia  ENDOCRINE: denies thyroid history  ALL/ASTHMA: denies hx of allergy or asthma    EXAM:   /70   Pulse 66   Ht 5' 3\" (1.6 m)   Wt 200 lb 3.2 oz (90.8 kg)   SpO2 97%   BMI 35.46 kg/m²      >   Wt Readings from Last 6 Encounters:   08/12/24 200 lb 3.2 oz (90.8 kg)   05/30/24 204 lb (92.5 kg)   05/23/24 195 lb 4.8 oz (88.6 kg)   02/01/24 212 lb (96.2 kg)   01/26/24 218 lb 3.2 oz (99 kg)   01/08/24 215 lb 6.4 oz (97.7 kg)       >   BP Readings from Last 3 Encounters:   08/12/24 134/70   05/30/24 130/70   05/23/24 152/68       GENERAL: well developed, well nourished, in no apparent distress  SKIN: no rashes, no suspicious lesions  HEENT: atraumatic, normocephalic, ears and throat are clear   Hearing intact no wax  EYES: PERRLA, EOMI, conjunctiva are clear              NECK: supple, no adenopathy, no bruits  CHEST: no chest tenderness  BREAST: no masses, no discharge or no axillary lymphadenopathy  atrophic texture  LUNGS: clear to auscultation  CARDIO: RRR2/6 systolic murmur over aorta  GI: good BS's, no masses, HSM or tenderness ventral hernia reducible  : deferred  RECTAL: deferred  MUSCULOSKELETAL: back is not tender, FROM of the back  EXTREMITIES:trace edema of the left ankle  NEURO: Oriented times three, cranial nerves are intact, motor and sensory are grossly intact    ASSESSMENT AND OTHER RELEVANT CHRONIC CONDITIONS:   Rosaura Hermosillo is a 89 year old female who presents for a Medicare Assessment.     PLAN SUMMARY:   Diagnoses and all orders for this  visit:    Medicare annual wellness visit, subsequent    Chronic diastolic congestive heart failure (HCC)    Edema, peripheral    Venous insufficiency (chronic) (peripheral)    Pulmonary embolism, other, unspecified chronicity, unspecified whether acute cor pulmonale present (HCC)    Screening mammogram, encounter for  -     [facility] ARRON 2D+3D SCREENING BILAT (CPT=77067/54565); Future         The patient indicates understanding of these issues and agrees to the plan.  The patient is asked to return in 12m for fu.       PREVENTATIVE SERVICES  INDICATIONS AND SCHEDULE Internal Lab or Procedure External Lab or Procedure   Diabetes Screening      HbgA1C   Annually No results found for: \"A1C\"      No data to display                Fasting Blood Sugar (FSB)Annually No results found for: \"GLUCOSE\"    Cardiovascular Disease Screening     LDL Annually LDL Cholesterol (mg/dL)   Date Value   06/04/2022 84        EKG One Time     Colorectal Cancer Screening      Colonoscopy Screen every 10 years No recommendations at this time Update Health Maintenance if applicable    Flex Sigmoidoscopy Screen every 10 years No results found for this or any previous visit.      No data to display                 Fecal Occult Blood Annually No results found for: \"FOB\"      No data to display                Glaucoma Screening      Ophthalmology Visit Annually     Bone Density Screening      Dexascan Every two years Last Dexa Scan:    XR DEXA BONE DENSITOMETRY (CPT=77080) 09/22/2022        No data to display                Pap and Pelvic      Pap  Annually if high risk No recommendations at this time Update Health Maintenance if applicable    Pap  Every two years No recommendations at this time Update Health Maintenance if applicable    Chlamydia  Annually if high risk No results found for: \"CHLAMYDIA\"      No data to display                Screening Mammogram      Mammogram  Annually No recommendations at this time Update Health Maintenance if  applicable   Immunizations      Influenza No orders found for this or any previous visit. Update Immunization Activity if applicable    Pneumococcal No orders found for this or any previous visit. Update Immunization Activity if applicable    Hepatitis B No orders found for this or any previous visit. Update Immunization Activity if applicable    Tetanus No orders found for this or any previous visit. Update Immunization Activity if applicable        SPECIFIC DISEASE MONITORING Internal Lab or Procedure External Lab or Procedure   Annual Monitoring of Persistent     Medications (ACE/ARB, digoxin diuretics, anticonvulsants.)    Potassium  Annually Potassium (mmol/L)   Date Value   06/26/2024 3.4 (L)     POTASSIUM (P) (mmol/L)   Date Value   09/15/2016 3.5         No data to display                Creatinine  Annually Creatinine (mg/dL)   Date Value   06/26/2024 1.34 (H)         No data to display                BUN  Annually BUN (mg/dL)   Date Value   06/26/2024 23         No data to display                 Drug Serum Conc  Annually No results found for: \"DIGOXIN\", \"DIG\", \"VALP\"      No data to display                Diabetes      HgbA1C  Annually No results found for: \"A1C\"      No data to display                Creat/alb ratio  Annually      LDL  Annually LDL Cholesterol (mg/dL)   Date Value   06/04/2022 84         No data to display                 Dilated Eye exam  Annually      No data to display                   No data to display                COPD      Spirometry Testing Annually No results found for this or any previous visit.      No data to display                  SCREENING SCHEDULE - FEMALE      SCREEN COVERAGE SCHEDULE  (If Indicated) LAST DONE   Dexa If at Risk q2 years    Lipids All Patients q5 years LDL Cholesterol (mg/dL)   Date Value   06/04/2022 84      Colonoscopy All Patients q10 years No recommendations at this time   FBS All Patients q1 year No results found for: \"GLUCOSE\"   Glaucoma If at  high risk q1 year    Pap If at high risk q1 year No recommendations at this time   Pap All Patients q2 year if indicated No recommendations at this time   Mammogram Age > 39 q1 year No recommendations at this time   EKG All Patients One at initial exam    Vaccines:      Pneumococcal All Patients Once per lifetime No orders found for this or any previous visit.   Influenza All Patients Annually No orders found for this or any previous visit.   Hepatitis B If at Risk 3 scheduled doses No orders found for this or any previous visit.         SUGGESTED VACCINATIONS - Influenza, Pneumococcal, Zoster, Tetanus     Immunization History   Administered Date(s) Administered    Covid-19 Vaccine Pfizer 30 mcg/0.3 ml 02/06/2021, 02/27/2021    TDAP 08/31/2023         History/Other:    Chief Complaint Reviewed and Verified  No Further Nursing Notes to   Review  Medications Reviewed  Problem List Reviewed         Tobacco:  She smoked tobacco in the past but quit greater than 12 months ago.  Social History     Tobacco Use   Smoking Status Former    Current packs/day: 0.50    Types: Cigarettes   Smokeless Tobacco Never   Tobacco Comments    smoked for 2 months only when she was 16 years old        Current Outpatient Medications   Medication Sig Dispense Refill    clotrimazole-betamethasone 1-0.05 % External Cream Apply 1 Application topically 2 (two) times daily as needed. 60 g 3    docusate sodium 100 MG Oral Cap Take 100 mg by mouth daily. 30 capsule 0    spironolactone 25 MG Oral Tab Take 1 tablet (25 mg total) by mouth daily.      polyethylene glycol, PEG 3350, 17 g Oral Powd Pack Take 17 g by mouth daily as needed (constipation).      furosemide 40 MG Oral Tab Take 1 tablet (40 mg total) by mouth daily.      PANTOPRAZOLE 40 MG Oral Tab EC TAKE 1 TABLET(40 MG) BY MOUTH BEFORE BREAKFAST 90 tablet 0    apixaban 2.5 MG Oral Tab Take 1 tablet (2.5 mg total) by mouth 2 (two) times daily. 180 tablet 3    albuterol 108 (90 Base)  MCG/ACT Inhalation Aero Soln Inhale 2 puffs into the lungs every 4 (four) hours as needed for Wheezing or Shortness of Breath. 1 each 0    alendronate 70 MG Oral Tab Take 1 tablet (70 mg total) by mouth every 7 days. 4 tablet 11    DILTIAZEM  MG Oral Capsule SR 24 Hr TAKE 1 CAPSULE(240 MG) BY MOUTH DAILY 90 capsule 1    Cranberry 500 MG Oral Cap Take by mouth.      acetaminophen 650 MG Rectal Suppos Place 1 suppository (650 mg total) rectally every 6 (six) hours as needed for Pain or Fever. (Patient not taking: Reported on 5/30/2024)      benzonatate 200 MG Oral Cap Take 1 capsule (200 mg total) by mouth 3 (three) times daily as needed for cough. (Patient not taking: Reported on 5/30/2024) 20 capsule 0         Review of Systems:  Review of Systems      Objective:   /70   Pulse 66   Ht 5' 3\" (1.6 m)   Wt 200 lb 3.2 oz (90.8 kg)   SpO2 97%   BMI 35.46 kg/m²  Estimated body mass index is 35.46 kg/m² as calculated from the following:    Height as of this encounter: 5' 3\" (1.6 m).    Weight as of this encounter: 200 lb 3.2 oz (90.8 kg).  Physical Exam      Assessment & Plan:   1. Medicare annual wellness visit, subsequent (Primary) up to date  2. Chronic diastolic congestive heart failure (HCC) compensated with diuretic  3. Edema, peripheral as above  4. Venous insufficiency (chronic) (peripheral) support hose  5. Pulmonary embolism, other, unspecified chronicity, unspecified whether acute cor pulmonale present (HCC) on eliquis 5 mg bid        No follow-ups on file.    Enid Segal MD, 8/12/2024, 10:48 AM

## 2024-08-22 RX ORDER — FUROSEMIDE 40 MG
40 TABLET ORAL 2 TIMES DAILY
Qty: 30 TABLET | Refills: 0 | Status: SHIPPED | OUTPATIENT
Start: 2024-08-22

## 2024-08-22 RX ORDER — PANTOPRAZOLE SODIUM 40 MG/1
40 TABLET, DELAYED RELEASE ORAL
Qty: 90 TABLET | Refills: 0 | Status: SHIPPED | OUTPATIENT
Start: 2024-08-22

## 2024-08-22 RX ORDER — ALENDRONATE SODIUM 70 MG/1
70 TABLET ORAL
Qty: 4 TABLET | Refills: 11 | Status: SHIPPED | OUTPATIENT
Start: 2024-08-22

## 2024-09-03 ENCOUNTER — TELEPHONE (OUTPATIENT)
Dept: INTERNAL MEDICINE CLINIC | Facility: CLINIC | Age: 89
End: 2024-09-03

## 2024-09-04 ENCOUNTER — HOSPITAL ENCOUNTER (EMERGENCY)
Facility: HOSPITAL | Age: 89
Discharge: HOME | End: 2024-09-04
Attending: EMERGENCY MEDICINE
Payer: MEDICARE

## 2024-09-04 ENCOUNTER — HOSPITAL ENCOUNTER (OUTPATIENT)
Age: 89
Discharge: ANOTHER HOSPITAL | End: 2024-09-04
Payer: MEDICARE

## 2024-09-04 VITALS
SYSTOLIC BLOOD PRESSURE: 147 MMHG | TEMPERATURE: 98 F | OXYGEN SATURATION: 99 % | HEART RATE: 50 BPM | DIASTOLIC BLOOD PRESSURE: 66 MMHG | RESPIRATION RATE: 18 BRPM

## 2024-09-04 VITALS
HEART RATE: 58 BPM | TEMPERATURE: 98 F | RESPIRATION RATE: 18 BRPM | OXYGEN SATURATION: 97 % | SYSTOLIC BLOOD PRESSURE: 147 MMHG | DIASTOLIC BLOOD PRESSURE: 62 MMHG

## 2024-09-04 DIAGNOSIS — N30.01 ACUTE CYSTITIS WITH HEMATURIA: Primary | ICD-10-CM

## 2024-09-04 DIAGNOSIS — M54.50 CHRONIC BILATERAL LOW BACK PAIN, UNSPECIFIED WHETHER SCIATICA PRESENT: ICD-10-CM

## 2024-09-04 DIAGNOSIS — G89.29 CHRONIC BILATERAL LOW BACK PAIN, UNSPECIFIED WHETHER SCIATICA PRESENT: ICD-10-CM

## 2024-09-04 DIAGNOSIS — R29.898 WEAKNESS OF BOTH LOWER EXTREMITIES: Primary | ICD-10-CM

## 2024-09-04 DIAGNOSIS — M54.9 INTRACTABLE BACK PAIN: ICD-10-CM

## 2024-09-04 LAB
BILIRUB UR QL: NEGATIVE
COLOR UR: YELLOW
GLUCOSE UR-MCNC: NORMAL MG/DL
HYALINE CASTS #/AREA URNS AUTO: PRESENT /LPF
KETONES UR-MCNC: NEGATIVE MG/DL
LEUKOCYTE ESTERASE UR QL STRIP.AUTO: 500
PH UR: 6 [PH] (ref 5–8)
PROT UR-MCNC: 30 MG/DL
RBC #/AREA URNS AUTO: >10 /HPF
SP GR UR STRIP: 1.02 (ref 1–1.03)
UROBILINOGEN UR STRIP-ACNC: NORMAL
WBC #/AREA URNS AUTO: >50 /HPF
WBC CLUMPS UR QL AUTO: PRESENT /HPF

## 2024-09-04 PROCEDURE — 87086 URINE CULTURE/COLONY COUNT: CPT | Performed by: EMERGENCY MEDICINE

## 2024-09-04 PROCEDURE — 87077 CULTURE AEROBIC IDENTIFY: CPT | Performed by: EMERGENCY MEDICINE

## 2024-09-04 PROCEDURE — 81001 URINALYSIS AUTO W/SCOPE: CPT | Performed by: EMERGENCY MEDICINE

## 2024-09-04 PROCEDURE — 99283 EMERGENCY DEPT VISIT LOW MDM: CPT

## 2024-09-04 PROCEDURE — 99213 OFFICE O/P EST LOW 20 MIN: CPT | Performed by: NURSE PRACTITIONER

## 2024-09-04 PROCEDURE — 87186 SC STD MICRODIL/AGAR DIL: CPT | Performed by: EMERGENCY MEDICINE

## 2024-09-04 RX ORDER — CEPHALEXIN 500 MG/1
500 CAPSULE ORAL 2 TIMES DAILY
Qty: 10 CAPSULE | Refills: 0 | Status: SHIPPED | OUTPATIENT
Start: 2024-09-04 | End: 2024-09-09

## 2024-09-04 RX ORDER — TRAMADOL HYDROCHLORIDE 50 MG/1
50 TABLET ORAL ONCE
Status: COMPLETED | OUTPATIENT
Start: 2024-09-04 | End: 2024-09-04

## 2024-09-04 RX ORDER — TRAMADOL HYDROCHLORIDE 50 MG/1
TABLET ORAL EVERY 6 HOURS PRN
Qty: 14 TABLET | Refills: 0 | Status: SHIPPED | OUTPATIENT
Start: 2024-09-04 | End: 2024-09-09

## 2024-09-04 RX ORDER — PREDNISONE 20 MG/1
40 TABLET ORAL ONCE
Status: COMPLETED | OUTPATIENT
Start: 2024-09-04 | End: 2024-09-04

## 2024-09-04 RX ORDER — PREDNISONE 20 MG/1
40 TABLET ORAL DAILY
Qty: 8 TABLET | Refills: 0 | Status: SHIPPED | OUTPATIENT
Start: 2024-09-04 | End: 2024-09-08

## 2024-09-04 NOTE — TELEPHONE ENCOUNTER
Patient was diagnosed with sciatic pain and has been taking Tylenol over-the-counter without relief.    Patient would like a prescription for pain medication to address this issue.    If approved, please send new prescription to:    [facility][id number] - [facility] [address] AT SEC OF [facility], [phone], [phone]     Amber rivas [facility] also believes that Physical Therapy will help the patient.    Can Dr. Segal (or covering physician) please place orders in the system for Physical Therapy?    Please call Amber at [facility] if new pain medication is approved and when PT orders are in the system:    [phone] kg    
Pt is aware needs to schedule an appt with a provider to go over issue. Pt agreed and will call back to schedule an appt with Bria.  
She will need an appt  Ok to schedule with BRYANNA Fraser   
no

## 2024-09-04 NOTE — ED PROVIDER NOTES
He    Patient Seen in: [facility]      History     Chief Complaint   Patient presents with    Pain     Stated Complaint: lower back pain, shoots to legs  Subjective:   89 y/o with a history of HTN and hyperlipidemia presents for back pain.  Her back pain started about 3 weeks ago.  Her daughter states that it started after she cleaned her house.  No fall or direct trauma.  The pain is to the lower back and radiates into the bilateral buttocks and down the posterior legs.  She has been taking Tylenol with little relief.  She states the pain is reproducible with movement and she has minimal discomfort at rest.  Her concern is that recently she has felt that her legs go numb while she is walking.  She states she will be walking and feel like she has no legs and has to wait until she gets feeling in her legs again before she continues walking.  She has a history of urinary incontinence, but states it has been worse.  She does have some discomfort with urination and some right sided abdominal discomfort.  She has a lot of pain when ambulatory.  No fevers.  She is in a wheelchair and here with family.      Objective:   Past Medical History:    Abnormal heart rhythm    Cataract    extraction and lens implantation per NG.    Chronic pulmonary embolism (HCC)    Cystocele    repair management per. NG    Essential hypertension    Hemorrhoid    Hernia, hiatal    High cholesterol    Hyperlipidemia    S/P cholecystectomy    lap cholecystectomy per. NG            Past Surgical History:   Procedure Laterality Date    Breast biopsy Right     2011    Colonoscopy      Electrocardiogram, complete  05/09/2013    scanned to media tab    Removal gallbladder                Social History     Socioeconomic History    Marital status:    Tobacco Use    Smoking status: Former     Current packs/day: 0.50     Types: Cigarettes    Smokeless tobacco: Never    Tobacco comments:     smoked for 2 months only when she was 16 years  old   Vaping Use    Vaping status: Never Used   Substance and Sexual Activity    Alcohol use: Yes     Comment: Rarely wine 1 glass    Drug use: Never   Other Topics Concern    Caffeine Concern No     Social Determinants of Health     Financial Resource Strain: Low Risk  (5/24/2024)    Financial Resource Strain     Difficulty of Paying Living Expenses: Not very hard     Med Affordability: No   Food Insecurity: No Food Insecurity (5/15/2024)    Food Insecurity     Food Insecurity: Never true   Transportation Needs: No Transportation Needs (5/24/2024)    Transportation Needs     Lack of Transportation: No   Housing Stability: Low Risk  (5/15/2024)    Housing Stability     Housing Instability: No            Review of Systems    Positive for stated complaint: Pain     Other systems are as noted in HPI.  Constitutional and vital signs reviewed.      All other systems reviewed and negative except as noted above.    Physical Exam     ED Triage Vitals [09/04/24 1830]   /62   Pulse 58   Resp 18   Temp 97.9 °F (36.6 °C)   Temp src Temporal   SpO2 97 %   O2 Device None (Room air)     Current:/62   Pulse 58   Temp 97.9 °F (36.6 °C) (Temporal)   Resp 18   SpO2 97%     Physical Exam  Vitals and nursing note reviewed.   Constitutional:       General: She is not in acute distress.     Appearance: Normal appearance. She is not toxic-appearing.   HENT:      Head: Normocephalic.   Cardiovascular:      Rate and Rhythm: Normal rate and regular rhythm.   Pulmonary:      Effort: Pulmonary effort is normal.      Breath sounds: Normal breath sounds.   Abdominal:      General: Bowel sounds are normal.      Palpations: Abdomen is soft.      Tenderness: There is abdominal tenderness in the right lower quadrant. There is no right CVA tenderness or left CVA tenderness.      Hernia: A hernia is present.   Musculoskeletal:         General: Tenderness present. No swelling.      Cervical back: Normal.      Thoracic back: Normal.       Lumbar back: Tenderness present. No bony tenderness. Negative right straight leg raise test and negative left straight leg raise test.      Right lower leg: No edema.      Left lower leg: No edema.      Comments: Pain across the lower back that radiates into the bilateral buttocks and down the posterior legs.  Negative bilateral leg raise.   Skin:     General: Skin is warm and dry.      Capillary Refill: Capillary refill takes less than 2 seconds.      Findings: No rash.   Neurological:      General: No focal deficit present.      Mental Status: She is alert and oriented to person, place, and time.      Sensory: No sensory deficit.      Motor: Weakness present.      Coordination: Coordination normal.      Gait: Gait abnormal.   Psychiatric:         Mood and Affect: Mood normal.         Behavior: Behavior normal.         ED Course   No results found.  Labs Reviewed - No data to display    MDM     Medical Decision Making  Due to the patient having leg weakness and times where she cannot feel her legs along with worsening incontinence, I will send her to the emergency department for further evaluation.  She agrees with the plan of care and her daughter will drive her to the emergency department [place].    Amount and/or Complexity of Data Reviewed  Discussion of management or test interpretation with external provider(s): I discussed this patient with Dr. Meléndez.  She agrees with the plan of care.        Disposition and Plan     Clinical Impression:  1. Weakness of both lower extremities    2. Intractable back pain         Disposition:  Ic to ed  9/4/2024  6:59 pm    Follow-up:  No follow-up provider specified.        Medications Prescribed:  Discharge Medication List as of 9/4/2024  6:59 PM

## 2024-09-05 ENCOUNTER — PATIENT OUTREACH (OUTPATIENT)
Dept: CASE MANAGEMENT | Age: 89
End: 2024-09-05

## 2024-09-05 NOTE — ED INITIAL ASSESSMENT (HPI)
Pt to ED via triage from [facility] c/o lower back pain that radiates down BLE x  weeks. Has intermittent numbness to BLEs. Still able to ambulate. Denies loss of bladder or bowel. Denies back injury/trauma

## 2024-09-05 NOTE — ED PROVIDER NOTES
Patient Seen in: [facility] Emergency Department    History     Chief Complaint   Patient presents with    Back Pain       HPI     89-year-old female presents ER with complaint of worsening low back pain for the past 3 weeks.  Patient was seen in the urgent care and sent to the ER for further evaluation.  Patient with urinary incontinence but states she has had urinary incontinence for years.  Patient denies any saddle anesthesia.  Patient is able to ambulate.  Patient does not have any pain unless she moves.  Patient contributes the back pain lately to when she vacuums her house when she had a party 3 weeks ago    History reviewed.   Past Medical History:    Abnormal heart rhythm    Cataract    extraction and lens implantation per NG.    Chronic pulmonary embolism (HCC)    Cystocele    repair management per. NG    Essential hypertension    Hemorrhoid    Hernia, hiatal    High cholesterol    Hyperlipidemia    S/P cholecystectomy    lap cholecystectomy per. NG       History reviewed.   Past Surgical History:   Procedure Laterality Date    Breast biopsy Right     2011    Colonoscopy      Electrocardiogram, complete  05/09/2013    scanned to media tab    Removal gallbladder           Medications :  (Not in a hospital admission)       Family History   Problem Relation Age of Onset    Heart Disorder Father 47        heart attack cause of death    Musculo-skelatal Disorder Mother         osteoporosis       Smoking Status:   Social History     Socioeconomic History    Marital status:    Tobacco Use    Smoking status: Former     Current packs/day: 0.50     Types: Cigarettes    Smokeless tobacco: Never    Tobacco comments:     smoked for 2 months only when she was 16 years old   Vaping Use    Vaping status: Never Used   Substance and Sexual Activity    Alcohol use: Yes     Comment: Rarely wine 1 glass    Drug use: Never   Other Topics Concern    Caffeine Concern No       ROS  All pertinent positives for the  review of systems are mentioned in the HPI  All other organ systems are reviewed and are negative.    Constitutional and vital signs reviewed.      Social History and Family History elements reviewed from today, pertinent positives to the presenting problem noted.    Physical Exam     ED Triage Vitals [09/04/24 1930]   /68   Pulse 59   Resp 18   Temp 98 °F (36.7 °C)   Temp src Temporal   SpO2 96 %   O2 Device None (Room air)       All measures to prevent infection transmission during my interaction with the patient were taken. The patient was already wearing a droplet mask on my arrival to the room. Personal protective equipment including droplet mask, eye protection, and gloves were worn throughout the duration of the exam.  Handwashing was performed prior to and after the exam.  Stethoscope and any equipment used during my examination was cleaned with super sani-cloth germicidal wipes following the exam.     Physical Exam  Vitals and nursing note reviewed.   Constitutional:       Appearance: She is well-developed.   HENT:      Head: Normocephalic and atraumatic.      Right Ear: External ear normal.      Left Ear: External ear normal.      Nose: Nose normal.   Eyes:      Conjunctiva/sclera: Conjunctivae normal.      Pupils: Pupils are equal, round, and reactive to light.   Cardiovascular:      Rate and Rhythm: Normal rate and regular rhythm.      Heart sounds: Normal heart sounds.   Pulmonary:      Effort: Pulmonary effort is normal.      Breath sounds: Normal breath sounds.   Abdominal:      General: Bowel sounds are normal.      Palpations: Abdomen is soft.   Musculoskeletal:         General: Normal range of motion.      Cervical back: Normal range of motion and neck supple.   Skin:     General: Skin is warm and dry.   Neurological:      General: No focal deficit present.      Mental Status: She is alert and oriented to person, place, and time. Mental status is at baseline.      Cranial Nerves: No cranial  nerve deficit.      Sensory: No sensory deficit.      Motor: No weakness.      Coordination: Coordination normal.      Gait: Gait normal.      Deep Tendon Reflexes: Reflexes are normal and symmetric.   Psychiatric:         Behavior: Behavior normal.         Thought Content: Thought content normal.         Judgment: Judgment normal.         ED Course        Labs Reviewed   URINALYSIS WITH CULTURE REFLEX - Abnormal; Notable for the following components:       Result Value    Clarity Urine Turbid (*)     Blood Urine Trace (*)     Protein Urine 30 (*)     Nitrite Urine 1+ (*)     Leukocyte Esterase Urine 500 (*)     WBC Urine >50 (*)     RBC Urine >10 (*)     Bacteria Urine 1+ (*)     Squamous Epi. Cells Few (*)     Hyaline Casts Present (*)     WBC Clump Present (*)     All other components within normal limits   URINE CULTURE, ROUTINE         Imaging Results Available and Reviewed while in ED: No results found.  ED Medications Administered:   Medications   traMADol (Ultram) tab 50 mg (50 mg Oral Given 9/4/24 2036)   predniSONE (Deltasone) tab 40 mg (40 mg Oral Given 9/4/24 2036)         MDM     Vitals:    09/04/24 1930 09/04/24 2030 09/04/24 2045   BP: 137/68 (!) 178/83 (!) 168/69   Pulse: 59 60 55   Resp: 18 17 18   Temp: 98 °F (36.7 °C)     TempSrc: Temporal     SpO2: 96% 98% 99%     *I personally reviewed and interpreted all ED vitals.  I also personally reviewed all labs and imaging if ordered    Pulse Ox: 96%, Room air, Normal     Monitor Interpretation:   normal sinus rhythm    Differential Diagnosis/ Diagnostic Considerations: Cord compression, sciatica, muscle strain, UTI,    Medical Record Review: I personally reviewed available prior medical records for any recent pertinent discharge summaries, testing, and procedures and reviewed those reports.    Complicating Factors: The patient already has does not have any pertinent problems on file. to contribute to the complexity of this ED evaluation.    Medical  Decision Making  89-year-old female presents ER with complaints of worsening low back pain for the last 3 weeks.  Patient 5 out of 5 strength bilateral lower extremities.  Patient with urinary incontinence but had urinary incontinence now for years.  Patient without any saddle anesthesia.  Patient able to ambulate states the pain improved with tramadol and prednisone.  Patient does not require emergent MRI at this time and is not clinically show any signs of cord compression.  Patient's daughter who is bedside agrees and states she will have a follow-up with her PCP if her pain worsens.  Patient discharged home with tramadol and prednisone for pain.    Problems Addressed:  Acute cystitis with hematuria: acute illness or injury  Chronic bilateral low back pain, unspecified whether sciatica present: acute illness or injury    Amount and/or Complexity of Data Reviewed  Independent Historian:      Details: Medical history obtained from the daughter who is bedside states that she been having pain for the last 3 weeks which she believes started after she vacuumed her house 3 weeks ago  External Data Reviewed: notes.     Details: Outpatient notes reviewed from urgent care.  Patient sent to the ER for concern for possible spinal cord compression.  Labs: ordered. Decision-making details documented in ED Course.    Risk  Prescription drug management.        Condition upon leaving the department: Stable    Disposition and Plan     Clinical Impression:  1. Acute cystitis with hematuria    2. Chronic bilateral low back pain, unspecified whether sciatica present        Disposition:  Discharge    Follow-up:  Enid Segal MD  [address] [place] [id number]  [phone]    Schedule an appointment as soon as possible for a visit  If symptoms worsen      Medications Prescribed:  Current Discharge Medication List        START taking these medications    Details   cephALEXin 500 MG Oral Cap Take 1 capsule (500 mg total) by  mouth 2 (two) times daily for 5 days.  Qty: 10 capsule, Refills: 0      predniSONE 20 MG Oral Tab Take 2 tablets (40 mg total) by mouth daily for 4 days.  Qty: 8 tablet, Refills: 0      traMADol 50 MG Oral Tab Take 1-2 tablets ( mg total) by mouth every 6 (six) hours as needed.  Qty: 14 tablet, Refills: 0    Associated Diagnoses: Acute cystitis with hematuria

## 2024-09-05 NOTE — PROGRESS NOTES
Pt had recent ED visit, calling to offer [facility] ED follow-up apt (discharged 09/04)    Dr Enid Segal  Internal Medicine  [address] [facility] [id number] [phone]  Pt declined apt; advised that her daughter will call to schedule her apt  Closing encounter

## 2024-09-09 ENCOUNTER — TELEPHONE (OUTPATIENT)
Dept: INTERNAL MEDICINE CLINIC | Facility: CLINIC | Age: 89
End: 2024-09-09

## 2024-09-09 ENCOUNTER — NURSE TRIAGE (OUTPATIENT)
Dept: INTERNAL MEDICINE CLINIC | Facility: CLINIC | Age: 89
End: 2024-09-09

## 2024-09-09 NOTE — TELEPHONE ENCOUNTER
Patient's daughter called and scheduled a hospital follow up with Dr. Segal on 9/13.  She was released from the hospital on 9/4.  Due to severe sciatic pain.    The daughter also is asking for a nurse to please call her mother, as they believe she is having an allergic reaction to Prozac (they think that is the medication she is having the allergy, too).  She stopped taking it as she turned red, Not hives the daughter said.

## 2024-09-09 NOTE — TELEPHONE ENCOUNTER
Patient will maintain appointment. With PCP.     Reason for Disposition   Mild localized rash    Answer Assessment - Initial Assessment Questions  1. APPEARANCE of RASH: \"Describe the rash.\"       Red  2. LOCATION: \"Where is the rash located?\"       Face arms   3. NUMBER: \"How many spots are there?\"       No   4. SIZE: \"How big are the spots?\" (Inches, centimeters or compare to size of a coin)       No   5. ONSET: \"When did the rash start?\"       9/ 5/24  6. ITCHING: \"Does the rash itch?\" If Yes, ask: \"How bad is the itch?\"  (Scale 0-10; or none, mild, moderate, severe)      No  7. PAIN: \"Does the rash hurt?\" If Yes, ask: \"How bad is the pain?\"  (Scale 0-10; or none, mild, moderate, severe)     - NONE (0): no pain     - MILD (1-3): doesn't interfere with normal activities      - MODERATE (4-7): interferes with normal activities or awakens from sleep      - SEVERE (8-10): excruciating pain, unable to do any normal activities      5/10  8. OTHER SYMPTOMS: \"Do you have any other symptoms?\" (e.g., fever)     Back pain   9. PREGNANCY: \"Is there any chance you are pregnant?\" \"When was your last menstrual period?\"      NA    Protocols used: Rash or Redness - [id number]

## 2024-09-13 ENCOUNTER — OFFICE VISIT (OUTPATIENT)
Dept: INTERNAL MEDICINE CLINIC | Facility: CLINIC | Age: 89
End: 2024-09-13
Payer: MEDICARE

## 2024-09-13 ENCOUNTER — TELEPHONE (OUTPATIENT)
Dept: INTERNAL MEDICINE CLINIC | Facility: CLINIC | Age: 89
End: 2024-09-13

## 2024-09-13 VITALS
WEIGHT: 199 LBS | DIASTOLIC BLOOD PRESSURE: 78 MMHG | HEART RATE: 73 BPM | OXYGEN SATURATION: 97 % | BODY MASS INDEX: 35.26 KG/M2 | SYSTOLIC BLOOD PRESSURE: 120 MMHG | HEIGHT: 63 IN

## 2024-09-13 DIAGNOSIS — Z09 HOSPITAL DISCHARGE FOLLOW-UP: Primary | ICD-10-CM

## 2024-09-13 DIAGNOSIS — N39.0 RECURRENT UTI: ICD-10-CM

## 2024-09-13 PROCEDURE — 99214 OFFICE O/P EST MOD 30 MIN: CPT | Performed by: INTERNAL MEDICINE

## 2024-09-13 RX ORDER — METOPROLOL SUCCINATE 50 MG/1
TABLET, EXTENDED RELEASE ORAL
COMMUNITY
Start: 2024-06-18

## 2024-09-13 NOTE — PROGRESS NOTES
Subjective:   Rosaura Hermosillo is a 89 year old female who presents for Hospital F/U     Patient here for a fu on an ED visit for back pain and a UTI which was treated with Keflex and caused some erythema of the face and arms.  Has completed the course of Ab No data recorded back to home health fu.  History/Other:    Chief Complaint Reviewed and Verified  No Further Nursing Notes to   Review  Medications Reviewed  Problem List Reviewed         Tobacco:  She smoked tobacco in the past but quit greater than 12 months ago.  Social History     Tobacco Use   Smoking Status Former    Current packs/day: 0.50    Types: Cigarettes   Smokeless Tobacco Never   Tobacco Comments    smoked for 2 months only when she was 16 years old        Current Outpatient Medications   Medication Sig Dispense Refill    metoprolol succinate ER 50 MG Oral Tablet 24 Hr       Nystatin 001916 UNIT/GM External Powder Apply 1 Application topically 2 (two) times daily. 60 g 3    ALENDRONATE 70 MG Oral Tab TAKE 1 TABLET(70 MG) BY MOUTH EVERY 7 DAYS 4 tablet 11    FUROSEMIDE 40 MG Oral Tab TAKE 1 TABLET(40 MG) BY MOUTH TWICE DAILY 30 tablet 0    PANTOPRAZOLE 40 MG Oral Tab EC TAKE 1 TABLET(40 MG) BY MOUTH BEFORE BREAKFAST 90 tablet 0    clotrimazole-betamethasone 1-0.05 % External Cream Apply 1 Application topically 2 (two) times daily as needed. 60 g 3    docusate sodium 100 MG Oral Cap Take 100 mg by mouth daily. 30 capsule 0    spironolactone 25 MG Oral Tab Take 1 tablet (25 mg total) by mouth daily.      acetaminophen 650 MG Rectal Suppos Place 1 suppository (650 mg total) rectally every 6 (six) hours as needed for Pain or Fever. (Patient not taking: Reported on 5/30/2024)      polyethylene glycol, PEG 3350, 17 g Oral Powd Pack Take 17 g by mouth daily as needed (constipation).      apixaban 2.5 MG Oral Tab Take 1 tablet (2.5 mg total) by mouth 2 (two) times daily. 180 tablet 3    albuterol 108 (90 Base) MCG/ACT Inhalation Aero Soln Inhale 2  puffs into the lungs every 4 (four) hours as needed for Wheezing or Shortness of Breath. 1 each 0    DILTIAZEM  MG Oral Capsule SR 24 Hr TAKE 1 CAPSULE(240 MG) BY MOUTH DAILY 90 capsule 1    Cranberry 500 MG Oral Cap Take by mouth.           Review of Systems:  Review of Systems   Constitutional:  Positive for fatigue.   Respiratory:  Negative for shortness of breath.    Cardiovascular:  Negative for leg swelling.   Gastrointestinal: Negative.    Genitourinary:  Negative for dysuria and frequency.   Musculoskeletal:  Positive for back pain.   Neurological:  Positive for weakness.   Psychiatric/Behavioral: Negative.           Objective:   /78   Pulse 73   Ht 5' 3\" (1.6 m)   Wt 199 lb (90.3 kg)   SpO2 97%   BMI 35.25 kg/m²  Estimated body mass index is 35.25 kg/m² as calculated from the following:    Height as of this encounter: 5' 3\" (1.6 m).    Weight as of this encounter: 199 lb (90.3 kg).  Physical Exam  HENT:      Head: Normocephalic and atraumatic.   Eyes:      General: No scleral icterus.     Pupils: Pupils are equal, round, and reactive to light.   Cardiovascular:      Rate and Rhythm: Normal rate and regular rhythm.      Heart sounds:      No gallop.   Pulmonary:      Effort: Pulmonary effort is normal.      Breath sounds: Normal breath sounds.   Abdominal:      General: There is no distension.      Palpations: Abdomen is soft.      Tenderness: There is no right CVA tenderness or left CVA tenderness.   Musculoskeletal:      Cervical back: Neck supple.      Right lower leg: No edema.      Left lower leg: No edema.   Skin:     Findings: No lesion.   Neurological:      Mental Status: She is alert. Mental status is at baseline.   Psychiatric:         Thought Content: Thought content normal.           Assessment & Plan:   1. Hospital discharge follow-up (Primary) - has been doing well since the ED visit.  2. Recurrent UTI - recheck convalescent Ua - hydration stressed        No follow-ups on  file.    Enid Segal MD, 9/13/2024, 10:48 AM

## 2024-09-13 NOTE — TELEPHONE ENCOUNTER
Amber from [facility] called regarding a re-certification of physical therapy, occupational therapy & Home Health Aid services for the patient.    Would Dr. Segal like to continue these Home Health services?    Patient was seen this morning (09/13/2024) in the office.    Please call Amber with [facility] to confirm re-certification of services:    [phone]    KG

## 2024-09-16 ENCOUNTER — LAB ENCOUNTER (OUTPATIENT)
Dept: LAB | Age: 89
End: 2024-09-16
Attending: INTERNAL MEDICINE
Payer: MEDICARE

## 2024-09-16 DIAGNOSIS — R30.0 DYSURIA: ICD-10-CM

## 2024-09-16 DIAGNOSIS — N23 URINARY TRACT PAIN: ICD-10-CM

## 2024-09-16 LAB
BILIRUB UR QL: NEGATIVE
CLARITY UR: CLEAR
GLUCOSE UR-MCNC: NORMAL MG/DL
HGB UR QL STRIP.AUTO: NEGATIVE
HYALINE CASTS #/AREA URNS AUTO: PRESENT /LPF
KETONES UR-MCNC: NEGATIVE MG/DL
LEUKOCYTE ESTERASE UR QL STRIP.AUTO: 75
NITRITE UR QL STRIP.AUTO: NEGATIVE
PH UR: 5.5 [PH] (ref 5–8)
PROT UR-MCNC: NEGATIVE MG/DL
SP GR UR STRIP: 1.01 (ref 1–1.03)
UROBILINOGEN UR STRIP-ACNC: NORMAL

## 2024-09-16 PROCEDURE — 81001 URINALYSIS AUTO W/SCOPE: CPT

## 2024-09-16 PROCEDURE — 87086 URINE CULTURE/COLONY COUNT: CPT

## 2024-10-04 ENCOUNTER — LAB ENCOUNTER (OUTPATIENT)
Dept: LAB | Age: 89
End: 2024-10-04
Attending: INTERNAL MEDICINE
Payer: MEDICARE

## 2024-10-04 ENCOUNTER — NURSE TRIAGE (OUTPATIENT)
Dept: INTERNAL MEDICINE CLINIC | Facility: CLINIC | Age: 89
End: 2024-10-04

## 2024-10-04 PROCEDURE — 87086 URINE CULTURE/COLONY COUNT: CPT | Performed by: INTERNAL MEDICINE

## 2024-10-04 PROCEDURE — 81001 URINALYSIS AUTO W/SCOPE: CPT | Performed by: INTERNAL MEDICINE

## 2024-10-04 NOTE — TELEPHONE ENCOUNTER
Patient called the office, as she believes she has a UTI.  She has had symptoms for a few days.    Would like a call back from the nurse.

## 2024-10-04 NOTE — TELEPHONE ENCOUNTER
Patient wants a urine test. She has no ride    Reason for Disposition   Urinating more frequently than usual (i.e., frequency)    Answer Assessment - Initial Assessment Questions  1. SYMPTOM: \"What's the main symptom you're concerned about?\" (e.g., frequency, incontinence)      Burning with urination   2. ONSET: \"When did the start?\"      Last week   3. PAIN: \"Is there any pain?\" If Yes, ask: \"How bad is it?\" (Scale: 1-10; mild, moderate, severe)      Moderate  4. CAUSE: \"What do you think is causing the symptoms?\"      Bladder infection   5. OTHER SYMPTOMS: \"Do you have any other symptoms?\" (e.g., blood in urine, fever, flank pain, pain with urination)      Pressure   6. PREGNANCY: \"Is there any chance you are pregnant?\" \"When was your last menstrual period?\"      NA    Protocols used: Urinary Symptoms-A-OH

## 2024-10-04 NOTE — TELEPHONE ENCOUNTER
Patient daughter called  Claudia made her aware that UA and culture order was placed. She states that they will drop off sample this afternoon.

## 2024-10-29 RX ORDER — FUROSEMIDE 40 MG/1
40 TABLET ORAL 2 TIMES DAILY
Qty: 30 TABLET | Refills: 0 | Status: SHIPPED | OUTPATIENT
Start: 2024-10-29

## 2024-11-01 ENCOUNTER — TELEPHONE (OUTPATIENT)
Dept: INTERNAL MEDICINE CLINIC | Facility: CLINIC | Age: 89
End: 2024-11-01

## 2024-11-01 NOTE — TELEPHONE ENCOUNTER
Amber from [facility] called the office.  Wants to let Dr. Segal know that this patient will be discharged from home health on 11/14.

## 2024-12-02 RX ORDER — PANTOPRAZOLE SODIUM 40 MG/1
40 TABLET, DELAYED RELEASE ORAL
Qty: 90 TABLET | Refills: 0 | Status: SHIPPED | OUTPATIENT
Start: 2024-12-02

## 2024-12-02 RX ORDER — FUROSEMIDE 40 MG/1
40 TABLET ORAL 2 TIMES DAILY
Qty: 30 TABLET | Refills: 0 | Status: SHIPPED | OUTPATIENT
Start: 2024-12-02

## 2025-01-29 ENCOUNTER — OFFICE VISIT (OUTPATIENT)
Age: OVER 89
End: 2025-01-29
Attending: STUDENT IN AN ORGANIZED HEALTH CARE EDUCATION/TRAINING PROGRAM
Payer: MEDICARE

## 2025-01-29 ENCOUNTER — LAB ENCOUNTER (OUTPATIENT)
Dept: LAB | Facility: HOSPITAL | Age: OVER 89
End: 2025-01-29
Attending: STUDENT IN AN ORGANIZED HEALTH CARE EDUCATION/TRAINING PROGRAM
Payer: MEDICARE

## 2025-01-29 VITALS
RESPIRATION RATE: 18 BRPM | TEMPERATURE: 98 F | DIASTOLIC BLOOD PRESSURE: 74 MMHG | OXYGEN SATURATION: 98 % | HEART RATE: 58 BPM | BODY MASS INDEX: 35 KG/M2 | SYSTOLIC BLOOD PRESSURE: 146 MMHG | WEIGHT: 197.31 LBS

## 2025-01-29 DIAGNOSIS — N18.30 STAGE 3 CHRONIC KIDNEY DISEASE, UNSPECIFIED WHETHER STAGE 3A OR 3B CKD (HCC): ICD-10-CM

## 2025-01-29 DIAGNOSIS — I26.99 PULMONARY EMBOLISM, OTHER, UNSPECIFIED CHRONICITY, UNSPECIFIED WHETHER ACUTE COR PULMONALE PRESENT (HCC): ICD-10-CM

## 2025-01-29 DIAGNOSIS — I26.99 PULMONARY EMBOLISM, OTHER, UNSPECIFIED CHRONICITY, UNSPECIFIED WHETHER ACUTE COR PULMONALE PRESENT (HCC): Primary | ICD-10-CM

## 2025-01-29 LAB
ALBUMIN SERPL-MCNC: 4.4 G/DL (ref 3.2–4.8)
ALBUMIN/GLOB SERPL: 1.4 {RATIO} (ref 1–2)
ALP LIVER SERPL-CCNC: 90 U/L
ALT SERPL-CCNC: 19 U/L
ANION GAP SERPL CALC-SCNC: 8 MMOL/L (ref 0–18)
AST SERPL-CCNC: 29 U/L (ref ?–34)
BASOPHILS # BLD AUTO: 0.07 X10(3) UL (ref 0–0.2)
BASOPHILS NFR BLD AUTO: 1.1 %
BILIRUB SERPL-MCNC: 0.6 MG/DL (ref 0.2–0.9)
BUN BLD-MCNC: 34 MG/DL (ref 9–23)
BUN/CREAT SERPL: 22.2 (ref 10–20)
CALCIUM BLD-MCNC: 9.8 MG/DL (ref 8.7–10.4)
CHLORIDE SERPL-SCNC: 105 MMOL/L (ref 98–112)
CO2 SERPL-SCNC: 25 MMOL/L (ref 21–32)
CREAT BLD-MCNC: 1.53 MG/DL
DEPRECATED RDW RBC AUTO: 41.1 FL (ref 35.1–46.3)
EGFRCR SERPLBLD CKD-EPI 2021: 32 ML/MIN/1.73M2 (ref 60–?)
EOSINOPHIL # BLD AUTO: 0.1 X10(3) UL (ref 0–0.7)
EOSINOPHIL NFR BLD AUTO: 1.6 %
ERYTHROCYTE [DISTWIDTH] IN BLOOD BY AUTOMATED COUNT: 12.4 % (ref 11–15)
FASTING STATUS PATIENT QL REPORTED: NO
GLOBULIN PLAS-MCNC: 3.2 G/DL (ref 2–3.5)
GLUCOSE BLD-MCNC: 120 MG/DL (ref 70–99)
HCT VFR BLD AUTO: 39.1 %
HGB BLD-MCNC: 13 G/DL
IMM GRANULOCYTES # BLD AUTO: 0.01 X10(3) UL (ref 0–1)
IMM GRANULOCYTES NFR BLD: 0.2 %
LYMPHOCYTES # BLD AUTO: 2.13 X10(3) UL (ref 1–4)
LYMPHOCYTES NFR BLD AUTO: 34.4 %
MCH RBC QN AUTO: 29.8 PG (ref 26–34)
MCHC RBC AUTO-ENTMCNC: 33.2 G/DL (ref 31–37)
MCV RBC AUTO: 89.7 FL
MONOCYTES # BLD AUTO: 0.43 X10(3) UL (ref 0.1–1)
MONOCYTES NFR BLD AUTO: 6.9 %
NEUTROPHILS # BLD AUTO: 3.45 X10 (3) UL (ref 1.5–7.7)
NEUTROPHILS # BLD AUTO: 3.45 X10(3) UL (ref 1.5–7.7)
NEUTROPHILS NFR BLD AUTO: 55.8 %
OSMOLALITY SERPL CALC.SUM OF ELEC: 295 MOSM/KG (ref 275–295)
PLATELET # BLD AUTO: 225 10(3)UL (ref 150–450)
POTASSIUM SERPL-SCNC: 3.8 MMOL/L (ref 3.5–5.1)
PROT SERPL-MCNC: 7.6 G/DL (ref 5.7–8.2)
RBC # BLD AUTO: 4.36 X10(6)UL
SODIUM SERPL-SCNC: 138 MMOL/L (ref 136–145)
WBC # BLD AUTO: 6.2 X10(3) UL (ref 4–11)

## 2025-01-29 PROCEDURE — 36415 COLL VENOUS BLD VENIPUNCTURE: CPT

## 2025-01-29 PROCEDURE — 80053 COMPREHEN METABOLIC PANEL: CPT

## 2025-01-29 PROCEDURE — 85025 COMPLETE CBC W/AUTO DIFF WBC: CPT

## 2025-01-30 ENCOUNTER — APPOINTMENT (OUTPATIENT)
Dept: HEMATOLOGY/ONCOLOGY | Facility: HOSPITAL | Age: OVER 89
End: 2025-01-30
Attending: STUDENT IN AN ORGANIZED HEALTH CARE EDUCATION/TRAINING PROGRAM
Payer: MEDICARE

## 2025-01-30 ENCOUNTER — APPOINTMENT (OUTPATIENT)
Dept: HEMATOLOGY/ONCOLOGY | Facility: HOSPITAL | Age: OVER 89
End: 2025-01-30
Attending: INTERNAL MEDICINE
Payer: MEDICARE

## 2025-02-21 DIAGNOSIS — I26.99 PULMONARY EMBOLISM, OTHER, UNSPECIFIED CHRONICITY, UNSPECIFIED WHETHER ACUTE COR PULMONALE PRESENT (HCC): Primary | ICD-10-CM

## 2025-02-21 RX ORDER — FUROSEMIDE 40 MG/1
40 TABLET ORAL 2 TIMES DAILY
Qty: 30 TABLET | Refills: 0 | Status: SHIPPED | OUTPATIENT
Start: 2025-02-21

## 2025-02-24 ENCOUNTER — OFFICE VISIT (OUTPATIENT)
Dept: INTERNAL MEDICINE CLINIC | Facility: CLINIC | Age: OVER 89
End: 2025-02-24
Payer: MEDICARE

## 2025-02-24 VITALS
HEIGHT: 63 IN | RESPIRATION RATE: 18 BRPM | OXYGEN SATURATION: 95 % | WEIGHT: 208.63 LBS | DIASTOLIC BLOOD PRESSURE: 74 MMHG | SYSTOLIC BLOOD PRESSURE: 128 MMHG | BODY MASS INDEX: 36.96 KG/M2 | HEART RATE: 75 BPM

## 2025-02-24 DIAGNOSIS — R30.0 DYSURIA: ICD-10-CM

## 2025-02-24 DIAGNOSIS — L91.8 INFLAMED SKIN TAG: Primary | ICD-10-CM

## 2025-02-24 LAB
BILIRUBIN URINE: NEGATIVE
CONTROL RUN WITHIN 24 HOURS?: YES
GLUCOSE URINE: NEGATIVE
KETONE URINE: NEGATIVE
NITRITE URINE: NEGATIVE
PH URINE: 5.5 (ref 5–8)
PROTEIN URINE: 100
SPEC GRAVITY: 1.03 (ref 1–1.03)
UROBILINOGEN URINE: 0.2

## 2025-02-24 PROCEDURE — 87086 URINE CULTURE/COLONY COUNT: CPT | Performed by: INTERNAL MEDICINE

## 2025-02-24 PROCEDURE — 87077 CULTURE AEROBIC IDENTIFY: CPT | Performed by: INTERNAL MEDICINE

## 2025-02-24 PROCEDURE — 87186 SC STD MICRODIL/AGAR DIL: CPT | Performed by: INTERNAL MEDICINE

## 2025-02-24 NOTE — PROGRESS NOTES
Subjective:   Rosaura Hermosillo is a 89 year old female who presents for Urinary Symptoms and Skin Problem (Growth on nose )     Patient here for evaluation of dysuria and cloudy urien for several days. No systemic symptoms.  Also has an irritated skin tag on the bridge of nose which is prone to bleeding.  History/Other:    Chief Complaint Reviewed and Verified  Nursing Notes Reviewed and   Verified  Tobacco Reviewed  Allergies Reviewed  Medications Reviewed    Problem List Reviewed  Medical History Reviewed  Surgical History   Reviewed  Family History Reviewed  Social History Reviewed         Tobacco: non smoker  She smoked tobacco in the past but quit greater than 12 months ago.  Social History     Tobacco Use   Smoking Status Former    Current packs/day: 0.50    Types: Cigarettes   Smokeless Tobacco Never   Tobacco Comments    smoked for 2 months only when she was 16 years old        Current Outpatient Medications   Medication Sig Dispense Refill    FUROSEMIDE 40 MG Oral Tab TAKE 1 TABLET(40 MG) BY MOUTH TWICE DAILY 30 tablet 0    apixaban 2.5 MG Oral Tab Take 1 tablet (2.5 mg total) by mouth 2 (two) times daily. 180 tablet 3    PANTOPRAZOLE 40 MG Oral Tab EC TAKE 1 TABLET(40 MG) BY MOUTH BEFORE BREAKFAST 90 tablet 0    metoprolol succinate ER 50 MG Oral Tablet 24 Hr       Nystatin 571509 UNIT/GM External Powder Apply 1 Application topically 2 (two) times daily. 60 g 3    ALENDRONATE 70 MG Oral Tab TAKE 1 TABLET(70 MG) BY MOUTH EVERY 7 DAYS 4 tablet 11    clotrimazole-betamethasone 1-0.05 % External Cream Apply 1 Application topically 2 (two) times daily as needed. 60 g 3    docusate sodium 100 MG Oral Cap Take 100 mg by mouth daily. 30 capsule 0    spironolactone 25 MG Oral Tab Take 1 tablet (25 mg total) by mouth daily.      polyethylene glycol, PEG 3350, 17 g Oral Powd Pack Take 17 g by mouth daily as needed (constipation).      albuterol 108 (90 Base) MCG/ACT Inhalation Aero Soln Inhale 2 puffs  into the lungs every 4 (four) hours as needed for Wheezing or Shortness of Breath. 1 each 0    DILTIAZEM  MG Oral Capsule SR 24 Hr TAKE 1 CAPSULE(240 MG) BY MOUTH DAILY 90 capsule 1    Cranberry 500 MG Oral Cap Take by mouth.      acetaminophen 650 MG Rectal Suppos Place 1 suppository (650 mg total) rectally every 6 (six) hours as needed for Pain or Fever. (Patient not taking: Reported on 2/24/2025)           Review of Systems:  Review of Systems   Genitourinary:  Positive for dysuria, frequency and urgency.   Skin:         Skin lesion on right lateral  nose         Objective:   /74   Pulse 75   Resp 18   Ht 5' 3\" (1.6 m)   Wt 208 lb 9.6 oz (94.6 kg)   SpO2 95%   BMI 36.95 kg/m²  Estimated body mass index is 36.95 kg/m² as calculated from the following:    Height as of this encounter: 5' 3\" (1.6 m).    Weight as of this encounter: 208 lb 9.6 oz (94.6 kg).  Physical Exam  Constitutional:       Appearance: She is obese.   HENT:      Head: Normocephalic and atraumatic.      Nose: Rhinorrhea present.      Comments: 1/2 cm of irritated skin tag on left side of nose  Eyes:      General: No scleral icterus.  Cardiovascular:      Rate and Rhythm: Normal rate and regular rhythm.   Pulmonary:      Effort: Pulmonary effort is normal.      Breath sounds: Normal breath sounds.   Abdominal:      Palpations: Abdomen is soft.   Musculoskeletal:      Cervical back: Neck supple.      Right lower leg: No edema.      Left lower leg: Edema (2 plus) present.   Skin:     Findings: Lesion: skin tag.   Neurological:      Mental Status: She is alert. Mental status is at baseline.   Psychiatric:         Thought Content: Thought content normal.           Assessment & Plan:   1. Inflamed skin tag (Primary) removed via cautery  2. Dysuria - check urine culture ahead of treating  -     Urine Culture, Routine; Future; Expected date: 02/24/2025        No follow-ups on file.    Enid Segal MD, 2/24/2025, 11:20 AM

## 2025-02-27 RX ORDER — AMOXICILLIN 500 MG/1
500 CAPSULE ORAL 3 TIMES DAILY
Qty: 30 CAPSULE | Refills: 0 | Status: SHIPPED | OUTPATIENT
Start: 2025-02-27 | End: 2025-03-09

## 2025-03-03 NOTE — PROGRESS NOTES
Cancer Center Progress Note       Hematology History     Oct 2021: Acute pulmonary embolism, VQ scan showing hyperlipoidemia.  No RV strain is noted.  Lower extremity Dopplers were negative for DVT.  Patient was sent on Xarelto for definitive duration..    2023 diagnosed with acute PE involving the right main PE Hematide ventricular strain.  Lower extremity Dopplers were negative.  Continued on anticoagulation apixaban.      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  Patient denies having any bleeding manifestations.  No recurrent VTE.  Denies having any constitutional symptoms.  No active cancer.      Past Medical History:    Abnormal heart rhythm    Cataract    extraction and lens implantation per NG.    Chronic pulmonary embolism (HCC)    Cystocele    repair management per. NG    Essential hypertension    Hemorrhoid    Hernia, hiatal    High cholesterol    Hyperlipidemia    S/P cholecystectomy    lap cholecystectomy per. NG     Family History   Problem Relation Age of Onset    Heart Disorder Father 47        heart attack cause of death    Musculo-skelatal Disorder Mother         osteoporosis     Social History     Socioeconomic History    Marital status:    Tobacco Use    Smoking status: Former     Current packs/day: 0.50     Types: Cigarettes    Smokeless tobacco: Never    Tobacco comments:     smoked for 2 months only when she was 16 years old   Vaping Use    Vaping status: Never Used   Substance and Sexual Activity    Alcohol use: Yes     Comment: Rarely wine 1 glass    Drug use: Never   Other Topics Concern    Caffeine Concern No       Blood pressure 146/74, pulse 58, temperature 97.8 °F (36.6 °C), temperature source Oral, resp. rate 18, weight 89.5 kg (197 lb 4.8 oz), SpO2 98%.  Physical Exam  Constitutional:       Appearance: Normal appearance.   HENT:      Head: Normocephalic.      Mouth/Throat:      Mouth: Mucous membranes are moist.   Eyes:      Pupils:  Pupils are equal, round, and reactive to light.   Cardiovascular:      Rate and Rhythm: Normal rate.   Pulmonary:      Effort: Pulmonary effort is normal.   Abdominal:      Palpations: Abdomen is soft.   Skin:     General: Skin is warm.   Neurological:      General: No focal deficit present.      Mental Status: She is alert and oriented to person, place, and time.            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  Patient had pulmonary embolism back in 2021 and treated with definitive course of rivaroxaban.  Patient had a recurrence in 2023 and currently is on apixaban 2.5 mg twice daily as a prophylactic dose.  CMP revealed mild elevation in her creatinine to 1.53 no dose reduction required for apixaban.  Recommend follow-up with primary care regarding her CKD.    Return to clinic in 1 year    Christy Bernal MD  Hematology

## 2025-04-14 RX ORDER — PANTOPRAZOLE SODIUM 40 MG/1
40 TABLET, DELAYED RELEASE ORAL
Qty: 90 TABLET | Refills: 0 | Status: SHIPPED | OUTPATIENT
Start: 2025-04-14

## 2025-04-14 RX ORDER — FUROSEMIDE 40 MG/1
40 TABLET ORAL 2 TIMES DAILY
Qty: 30 TABLET | Refills: 0 | Status: SHIPPED | OUTPATIENT
Start: 2025-04-14

## 2025-05-09 RX ORDER — FUROSEMIDE 40 MG/1
40 TABLET ORAL 2 TIMES DAILY
Qty: 30 TABLET | Refills: 0 | Status: SHIPPED | OUTPATIENT
Start: 2025-05-09

## 2025-05-27 RX ORDER — NYSTATIN 100000 [USP'U]/G
1 POWDER TOPICAL 2 TIMES DAILY
Qty: 60 G | Refills: 3 | Status: SHIPPED | OUTPATIENT
Start: 2025-05-27

## 2025-06-09 ENCOUNTER — LAB ENCOUNTER (OUTPATIENT)
Dept: LAB | Age: OVER 89
End: 2025-06-09
Attending: INTERNAL MEDICINE
Payer: MEDICARE

## 2025-06-09 DIAGNOSIS — N39.0 RECURRENT UTI: ICD-10-CM

## 2025-06-09 PROCEDURE — 87086 URINE CULTURE/COLONY COUNT: CPT

## 2025-06-09 PROCEDURE — 87077 CULTURE AEROBIC IDENTIFY: CPT

## 2025-06-09 PROCEDURE — 87186 SC STD MICRODIL/AGAR DIL: CPT

## 2025-07-14 RX ORDER — PANTOPRAZOLE SODIUM 40 MG/1
40 TABLET, DELAYED RELEASE ORAL
Qty: 90 TABLET | Refills: 3 | Status: SHIPPED | OUTPATIENT
Start: 2025-07-14

## 2025-07-14 RX ORDER — FUROSEMIDE 40 MG/1
40 TABLET ORAL 2 TIMES DAILY
Qty: 30 TABLET | Refills: 0 | Status: SHIPPED | OUTPATIENT
Start: 2025-07-14

## 2025-07-17 ENCOUNTER — TELEPHONE (OUTPATIENT)
Dept: INTERNAL MEDICINE CLINIC | Facility: CLINIC | Age: OVER 89
End: 2025-07-17

## 2025-07-21 ENCOUNTER — TELEPHONE (OUTPATIENT)
Dept: INTERNAL MEDICINE CLINIC | Facility: CLINIC | Age: OVER 89
End: 2025-07-21

## 2025-07-21 NOTE — TELEPHONE ENCOUNTER
Received fax certificate of death.     Given to Dr. Segal for signature.     Faxed certificate.   ALE CANO

## (undated) NOTE — Clinical Note
Spoke with pt today for TCM--please see notes. Pt confirms [facility] RN scheduled to see her today, around 11 a.m., for start of care. Pt also confirms 5/30/2024 TCM appt with you--thank you.  Future Appointments 5/30/2024  1:30 PM    Enid Segal MD [facility] [address] 9/7/2024   11:30 AM   Enid Segal MD [facility] [address] 1/30/2025  1:00 PM    Cam Munguia MD          [facility] HEM ONC         EMO

## (undated) NOTE — Clinical Note
Dear Dr. Robert Barbosa had the opportunity to see your patient Amelia Doll recently. I am sending you this update, and I appreciate your confidence in me to care for your patients.  Please feel free call me with any questions at [phone] or contact me

## (undated) NOTE — LETTER
[address]   Date:   6/11/2020     Name:   Félix Zaidi    YOB: 1933   MRN:   [id number]       WHERE IS YOUR PAIN NOW? Ketan the areas on your body where you feel the described sensations.   Use the appropriate s

## (undated) NOTE — LETTER
November 5, 2021    [address] [facility] [address]    Dear Tim Longoria: It was a pleasure speaking with you over the phone recently.  To follow up, I wanted to send you some contact information to utilize when you hav

## (undated) NOTE — Clinical Note
Dear Dr. Pam Silveira had the opportunity to see your patient Noam Matias recently. I am sending you this update, and I appreciate your confidence in me to care for your patients.  Please feel free call me with any questions at [phone] or contact me

## (undated) NOTE — LETTER
Nick Dub 37   Date:   9/2/2020     Name:   Christiano Jaramillo    YOB: 1933   MRN:   [id number]       WHERE IS YOUR PAIN NOW? Ketan the areas on your body where you feel the described sensations.   Use the appropriate sy

## (undated) NOTE — LETTER
11/19/2018              [place] \[facility] [place] [facility]\""        [address] [facility] [address] [address]         Dear Adriana Caban,    [address] [facility] records indicate that the blood test ordered for you by Silva Rodríguez MD  has not been done.   If you have, in fact, already

## (undated) NOTE — Clinical Note
YAMILA, TCM call made, see notes. JORDAN confirmed with patient that she has a HFU on 11/16/2021 at 1:40 pm, message sent to MD's office requesting that the visit type be changed to TCM HFU.

## (undated) NOTE — LETTER
Nick Dub 37   Date:   8/5/2020     Name:   Kevin Sam    YOB: 1933   MRN:   [id number]       WHERE IS YOUR PAIN NOW? Ketan the areas on your body where you feel the described sensations.   Use the appropriate sy

## (undated) NOTE — Clinical Note
YAMILA, TCM call made, see notes. NCM Attempted to schedule PCP office TCM appointment with patient she states that her daughter Ritika Ludwig will be calling to schedule her follow up appointments as she has to schedule around her work schedule. Message sent to MD's office.

## (undated) NOTE — ED AVS SNAPSHOT
Ousmane Lincoln   MRN: [id number]    Department:  [facility] Emergency Department   Date of Visit:  3/16/2020           Disclosure     Insurance plans vary and the physician(s) referred by the ER may not be covered by your plan.  Please contact within the next three months to obtain basic health screening including reassessment of your blood pressure.     IF THERE IS ANY CHANGE OR WORSENING OF YOUR CONDITION, CALL YOUR PRIMARY CARE PHYSICIAN AT ONCE OR RETURN IMMEDIATELY TO THE EMERGENCY DEPARTMEN

## (undated) NOTE — Clinical Note
JUANCARLOSI, TCM call made, see notes. NCM attempted to schedule a TCM HFU, patient states she has already called to schedule the appointment and is waiting for a call back from Dr. Cliff Castanon office. Message sent to MD's office.

## (undated) NOTE — Clinical Note
FYI, TCM call made, see notes. JORDAN sent a message to MD's office requesting that they call patient to schedule a TCM HFU.

## (undated) NOTE — Clinical Note
Dear Dr. Cornejo Ion you for sending Juan Samuel to see me for physiatry consultation. I appreciate your confidence in me to care for your patients. Please feel free call me with any questions at [phone] or contact me through [facility]. Sincerely,Nick

## (undated) NOTE — LETTER
Micaela Celeste, [address], [place]       08/14/19        Patient: Tucker Shay   YOB: 1933   Date of Visit: 8/14/2019       Dear  Dr. Silvio Tan MD,      Thank you for referring Tucker Shay to my pract

## (undated) NOTE — Clinical Note
FYI, TCM call made, see notes. Patient has a TCM appointment on 10/31/2023 which is outside of the TCM timeframe, message sent to MD's office with a request to call patient's daughter Nita Roth at [phone] to reschedule sooner within the TCM timeframe.